# Patient Record
Sex: MALE | Race: BLACK OR AFRICAN AMERICAN | Employment: OTHER | URBAN - METROPOLITAN AREA
[De-identification: names, ages, dates, MRNs, and addresses within clinical notes are randomized per-mention and may not be internally consistent; named-entity substitution may affect disease eponyms.]

---

## 2017-02-03 RX ORDER — METFORMIN HYDROCHLORIDE 500 MG/1
TABLET, EXTENDED RELEASE ORAL
Qty: 60 TAB | Refills: 0 | Status: SHIPPED | OUTPATIENT
Start: 2017-02-03 | End: 2017-03-09 | Stop reason: SDUPTHER

## 2017-02-03 NOTE — TELEPHONE ENCOUNTER
Advised pt MD has sent in a rf but he really needs to have his labs done. Also last seen back 8/4/16 - needs fuv. Lab slip reprinted - at  to .

## 2017-02-06 DIAGNOSIS — E11.9 CONTROLLED TYPE 2 DIABETES MELLITUS WITHOUT COMPLICATION, UNSPECIFIED LONG TERM INSULIN USE STATUS: Primary | ICD-10-CM

## 2017-02-06 DIAGNOSIS — E78.2 MIXED HYPERLIPIDEMIA: ICD-10-CM

## 2017-02-08 ENCOUNTER — OFFICE VISIT (OUTPATIENT)
Dept: INTERNAL MEDICINE CLINIC | Age: 52
End: 2017-02-08

## 2017-02-08 VITALS
HEART RATE: 91 BPM | DIASTOLIC BLOOD PRESSURE: 90 MMHG | RESPIRATION RATE: 16 BRPM | TEMPERATURE: 98.7 F | WEIGHT: 262.4 LBS | OXYGEN SATURATION: 98 % | HEIGHT: 70 IN | BODY MASS INDEX: 37.56 KG/M2 | SYSTOLIC BLOOD PRESSURE: 130 MMHG

## 2017-02-08 DIAGNOSIS — I10 ESSENTIAL HYPERTENSION, BENIGN: ICD-10-CM

## 2017-02-08 DIAGNOSIS — E11.9 DIABETIC EYE EXAM (HCC): ICD-10-CM

## 2017-02-08 DIAGNOSIS — E11.9 ENCOUNTER FOR DIABETIC FOOT EXAM (HCC): ICD-10-CM

## 2017-02-08 DIAGNOSIS — Z11.59 NEED FOR HEPATITIS C SCREENING TEST: ICD-10-CM

## 2017-02-08 DIAGNOSIS — Z23 NEED FOR TDAP VACCINATION: ICD-10-CM

## 2017-02-08 DIAGNOSIS — Z01.00 DIABETIC EYE EXAM (HCC): ICD-10-CM

## 2017-02-08 DIAGNOSIS — E78.2 MIXED HYPERLIPIDEMIA: ICD-10-CM

## 2017-02-08 DIAGNOSIS — B35.1 TOENAIL FUNGUS: ICD-10-CM

## 2017-02-08 DIAGNOSIS — E11.9 CONTROLLED TYPE 2 DIABETES MELLITUS WITHOUT COMPLICATION, UNSPECIFIED LONG TERM INSULIN USE STATUS: ICD-10-CM

## 2017-02-08 LAB — HBA1C MFR BLD HPLC: 10 %

## 2017-02-08 NOTE — MR AVS SNAPSHOT
Visit Information Date & Time Provider Department Dept. Phone Encounter #  
 2/8/2017  4:50 PM Kimberly Parks, [address] [facility] [phone] [id number] Follow-up Instructions Return in about 3 months (around 5/8/2017). Upcoming Health Maintenance Date Due Hepatitis C Screening 1965 COLONOSCOPY 10/24/1981 Pneumococcal 19-64 Medium Risk (1 of 1 - PPSV23) 10/24/1982 DTaP/Tdap/Td series (1 - Tdap) 1/2/2007 EYE EXAM RETINAL OR DILATED Q1 7/15/2015 FOOT EXAM Q1 9/16/2016 MICROALBUMIN Q1 10/12/2016 HEMOGLOBIN A1C Q6M 11/6/2016 LIPID PANEL Q1 5/6/2017 Allergies as of 2/8/2017  Review Complete On: 2/8/2017 By: Kimberly Parks MD  
  
 Severity Noted Reaction Type Reactions Egg High 05/04/2016    Seizures Current Immunizations  Reviewed on 9/1/2016 Name Date  
 TD Vaccine 1/1/2007 Tdap  Incomplete Not reviewed this visit You Were Diagnosed With   
  
 Codes Comments Uncontrolled type 2 diabetes mellitus without complication, without long-term current use of insulin ([place] [address].)    -  Primary ICD-10-CM: E11.65 ICD-9-CM: 250.02 Need for Tdap vaccination     ICD-10-CM: L13 ICD-9-CM: V06.1 Diabetic eye exam ([place] [address].)     ICD-10-CM: E11.9, Z01.00 ICD-9-CM: V72.0, 250.00 Need for hepatitis C screening test     ICD-10-CM: Z11.59 
ICD-9-CM: V73.89 Encounter for diabetic foot exam ([place] [address].)     ICD-10-CM: E11.9 ICD-9-CM: 250.00 Controlled type 2 diabetes mellitus without complication, unspecified long term insulin use status ([facility].)     ICD-10-CM: E11.9 ICD-9-CM: 250.00 Mixed hyperlipidemia     ICD-10-CM: E78.2 ICD-9-CM: 272.2 Toenail fungus     ICD-10-CM: B35.1 ICD-9-CM: 110.1 Essential hypertension, benign     ICD-10-CM: I10 
ICD-9-CM: 401.1 Vitals BP Pulse Temp Resp Height(growth percentile) Weight(growth percentile)  130/90 (BP 1 Location: Right arm, BP Patient Position: Sitting) 91 98.7 °F (37.1 °C) (Oral) 16 5' 10\" (1.778 m) 262 lb 6.4 oz (119 kg) SpO2 BMI Smoking Status 98% 37.65 kg/m2 Never Smoker BMI and BSA Data Body Mass Index Body Surface Area  
 37.65 kg/m 2 2.42 m 2 Preferred Pharmacy Pharmacy Name Phone [id number] [place], [place] - [address] [phone] Your Updated Medication List  
  
   
This list is accurate as of: 2/8/17  6:18 PM.  Always use your most recent med list.  
  
  
  
  
 albuterol 90 mcg/actuation inhaler Commonly known as:  PROVENTIL HFA, VENTOLIN HFA, PROAIR HFA Use albuterol inhaler 2-4 puffs every 4-6 hours for 48 hours then every 4-6 hours as needed. aspirin 81 mg chewable tablet Take 1 Tab by mouth daily. atorvastatin 40 mg tablet Commonly known as:  LIPITOR  
TAKE 1 TABLET BY MOUTH DAILY. cetirizine 10 mg tablet Commonly known as:  ZYRTEC  
TAKE 1 TABLET BY MOUTH DAILY. fluticasone 50 mcg/actuation nasal spray Commonly known as:  Aron Guild 2 Sprays by Both Nostrils route daily. glimepiride 4 mg tablet Commonly known as:  AMARYL  
TAKE 1 TABLET BY MOUTH EVERY DAY IN THE MORNING  
  
 glucose blood VI test strips strip Commonly known as:  ACCU-CHEK BRONSON PLUS TEST STRP Check blood sugars three times daily. Dx E11.65.  
  
 meloxicam 15 mg tablet Commonly known as:  MOBIC Take 1 Tab by mouth daily. metFORMIN  mg tablet Commonly known as:  GLUCOPHAGE XR  
TAKE 2 TABLETS BY MOUTH EVERY DAY WITH DINNER  
  
 triamterene-hydroCHLOROthiazide 37.5-25 mg per capsule Commonly known as:  Darene Ponce De Leon TAKE 1 CAPSULE BY MOUTH DAILY We Performed the Following CBC WITH AUTOMATED DIFF [06619 CPT(R)] HEMOGLOBIN A1C WITH EAG [55027 CPT(R)] HEPATITIS C AB [92909 CPT(R)]  DIABETES FOOT EXAM [7 Custom] LIPID PANEL [95156 CPT(R)] METABOLIC PANEL, COMPREHENSIVE [78469 CPT(R)] MICROALBUMIN, UR, RAND W/ MICROALBUMIN/CREA RATIO [id number] CPT(R)] REFERRAL TO OPHTHALMOLOGY [REF57 Custom] REFERRAL TO PODIATRY [REF90 Custom] TETANUS, DIPHTHERIA TOXOIDS AND ACELLULAR PERTUSSIS VACCINE (TDAP), IN INDIVIDS. >=7, IM [id number] CPT(R)] Follow-up Instructions Return in about 3 months (around 5/8/2017). Referral Information Referral ID Referred By Referred To  
  
 [id number] ESTEFANÍA TRAN Not Available Visits Status Start Date End Date 1 New Request 2/8/17 2/8/18 If your referral has a status of pending review or denied, additional information will be sent to support the outcome of this decision. Referral ID Referred By Referred To  
 [id number] Flavia TARN DPM  
   [address] [facility] [address] [facility], [address] Phone: [phone] Fax: [phone] Visits Status Start Date End Date 1 New Request 2/8/17 2/8/18 If your referral has a status of pending review or denied, additional information will be sent to support the outcome of this decision. Introducing \[facility]\"" & HEALTH SERVICES! John Keita introduces [employer] patient portal. Now you can access parts of your medical record, email your doctor's office, and request medication refills online. 1. In your internet browser, go to [email or web address] 2. Click on the First Time User? Click Here link in the Sign In box. You will see the New Member Sign Up page. 3. Enter your [employer] Access Code exactly as it appears below. You will not need to use this code after youve completed the sign-up process. If you do not sign up before the expiration date, you must request a new code. · [employer] Access Code: [email or web address] Expires: 5/9/2017  6:18 PM 
 
4. Enter the last four digits of your Social Security Number (xxxx) and Date of Birth (mm/dd/yyyy) as indicated and click Submit.  You will be taken to the next sign-up page. 
5. Create a Genoa Color Technologiest ID. This will be your [employer] login ID and cannot be changed, so think of one that is secure and easy to remember. 6. Create a [employer] M[employer] password. You can change your password at any time. 7. Enter your Password Reset Question and Answer. This can be used at a later time if you forget your password. 8. Enter your e-mail address. You will receive e-mail notification when new information is available in [address] 9. Click Sign Up. You can now view and download portions of your medical record. 10. Click the Download Summary menu link to download a portable copy of your medical information. If you have questions, please visit the Frequently Asked Questions section of the [employer] website. Remember, [employer] is NOT to be used for urgent needs. For medical emergencies, dial 911. Now available from your iPhone and Android! Please provide this summary of care documentation to your next provider. Your primary care clinician is listed as ESTEFANÍA TRAN. If you have any questions after today's visit, please call [phone].

## 2017-02-08 NOTE — PROGRESS NOTES
HISTORY OF PRESENT ILLNESS  Huey Owen is a 46 y.o. male. DAVID Abraham is seen today accompanied by wife Madison Look for follow up of diabetes and other problems. He is significantly overdue for follow up. 1. Diabetes type 2 uncontrolled without complication without long term insulin usage. Reviewed labs, done at [facility] and I only have a partial report. His A1c is markedly abnormal.  Reviewed medication adjustments and the need for diet and exercise. Also will have him see Pharm[facility] for diabetic education and counseling. 2. Mixed hyperlipidemia. Stable readings on current regimen. 3. Knee arthritis. He continues with knee pain. He is now more functional.    Social history notable for changing occupations. He is now selling health insurance. MedDATA/gwo     Current Outpatient Prescriptions   Medication Sig    metFORMIN ER (GLUCOPHAGE XR) 500 mg tablet TAKE 2 TABLETS BY MOUTH EVERY DAY WITH DINNER    glimepiride (AMARYL) 4 mg tablet TAKE 1 TABLET BY MOUTH EVERY DAY IN THE MORNING    glucose blood VI test strips (ACCU-CHEK BRONSON PLUS TEST STRP) strip Check blood sugars three times daily. Dx E11.65.    cetirizine (ZYRTEC) 10 mg tablet TAKE 1 TABLET BY MOUTH DAILY.  atorvastatin (LIPITOR) 40 mg tablet TAKE 1 TABLET BY MOUTH DAILY.  triamterene-hydrochlorothiazide (DYAZIDE) 37.5-25 mg per capsule TAKE 1 CAPSULE BY MOUTH DAILY    fluticasone (FLONASE) 50 mcg/actuation nasal spray 2 Sprays by Both Nostrils route daily.  albuterol (PROVENTIL HFA, VENTOLIN HFA, PROAIR HFA) 90 mcg/actuation inhaler Use albuterol inhaler 2-4 puffs every 4-6 hours for 48 hours then every 4-6 hours as needed.  meloxicam (MOBIC) 15 mg tablet Take 1 Tab by mouth daily.  aspirin 81 mg chewable tablet Take 1 Tab by mouth daily. No current facility-administered medications for this visit. Review of Systems   Constitutional: Negative for weight loss. Respiratory: Negative.     Cardiovascular: Negative for chest pain, palpitations, leg swelling and PND. Musculoskeletal: Negative for myalgias. Neurological: Negative for focal weakness. Physical Exam   Constitutional: No distress. Neck: Carotid bruit is not present. Cardiovascular: Normal rate and regular rhythm. Exam reveals no gallop and no friction rub. No murmur heard. Pulmonary/Chest: Effort normal and breath sounds normal. No respiratory distress. Musculoskeletal: He exhibits no edema. Feet without lesion or ulcer    Nursing note and vitals reviewed. ASSESSMENT and PLAN  River Crocker was seen today for medication evaluation and nail problem. Diagnoses and all orders for this visit:    Uncontrolled type 2 diabetes mellitus without complication, without long-term current use of insulin (HCC)  -     MICROALBUMIN, UR, RAND W/ MICROALBUMIN/CREA RATIO  -     HEMOGLOBIN A1C WITH EAG  -     METABOLIC PANEL, COMPREHENSIVE    Need for Tdap vaccination  -     TETANUS, DIPHTHERIA TOXOIDS AND ACELLULAR PERTUSSIS VACCINE (TDAP), IN INDIVIDS. >=7, IM    Diabetic eye exam ([facility]  -     REFERRAL TO OPHTHALMOLOGY    Need for hepatitis C screening test  -     HEPATITIS C AB    Encounter for diabetic foot exam [facility]    Controlled type 2 diabetes mellitus without complication, unspecified long term insulin use status (HCC)  -      DIABETES FOOT EXAM    Mixed hyperlipidemia  -     LIPID PANEL  -     CBC WITH AUTOMATED DIFF    Toenail fungus  -     REFERRAL TO PODIATRY    Essential hypertension, benign- Continue current regimen of prescription and / or OTC medications     This has been fully explained to the patient, who indicates understanding.

## 2017-02-09 LAB — CREATININE, EXTERNAL: 0.94

## 2017-02-13 RX ORDER — GLIMEPIRIDE 4 MG/1
TABLET ORAL
Qty: 30 TAB | Refills: 5 | Status: SHIPPED | OUTPATIENT
Start: 2017-02-13 | End: 2017-08-09 | Stop reason: SDUPTHER

## 2017-03-09 RX ORDER — METFORMIN HYDROCHLORIDE 500 MG/1
TABLET, EXTENDED RELEASE ORAL
Qty: 60 TAB | Refills: 6 | Status: SHIPPED | OUTPATIENT
Start: 2017-03-09 | End: 2017-08-09 | Stop reason: SDUPTHER

## 2017-05-08 ENCOUNTER — OFFICE VISIT (OUTPATIENT)
Dept: INTERNAL MEDICINE CLINIC | Age: 52
End: 2017-05-08

## 2017-05-08 VITALS
OXYGEN SATURATION: 96 % | BODY MASS INDEX: 37.99 KG/M2 | DIASTOLIC BLOOD PRESSURE: 88 MMHG | WEIGHT: 265.4 LBS | HEIGHT: 70 IN | HEART RATE: 95 BPM | RESPIRATION RATE: 20 BRPM | TEMPERATURE: 98.4 F | SYSTOLIC BLOOD PRESSURE: 128 MMHG

## 2017-05-08 DIAGNOSIS — Z11.59 NEED FOR HEPATITIS C SCREENING TEST: ICD-10-CM

## 2017-05-08 DIAGNOSIS — Z01.00 DIABETIC EYE EXAM (HCC): ICD-10-CM

## 2017-05-08 DIAGNOSIS — E11.9 DIABETIC EYE EXAM (HCC): ICD-10-CM

## 2017-05-08 DIAGNOSIS — E78.2 MIXED HYPERLIPIDEMIA: ICD-10-CM

## 2017-05-08 DIAGNOSIS — E11.9 CONTROLLED TYPE 2 DIABETES MELLITUS WITHOUT COMPLICATION, UNSPECIFIED LONG TERM INSULIN USE STATUS: ICD-10-CM

## 2017-05-08 LAB
HBA1C MFR BLD HPLC: 11.2 %
HBA1C MFR BLD HPLC: NORMAL %

## 2017-05-08 RX ORDER — PIOGLITAZONEHYDROCHLORIDE 30 MG/1
30 TABLET ORAL DAILY
Qty: 30 TAB | Refills: 11 | Status: SHIPPED | OUTPATIENT
Start: 2017-05-08 | End: 2018-06-08 | Stop reason: SDUPTHER

## 2017-05-08 NOTE — MR AVS SNAPSHOT
Visit Information Date & Time Provider Department Dept. Phone Encounter #  
 5/8/2017  4:35 PM Jagruti Mcdonough, [facility] [phone] [id number] Follow-up Instructions Return in about 3 months (around 8/8/2017). Upcoming Health Maintenance Date Due Hepatitis C Screening 1965 Pneumococcal 19-64 Medium Risk (1 of 1 - PPSV23) 10/24/1982 EYE EXAM RETINAL OR DILATED Q1 7/15/2015 MICROALBUMIN Q1 10/12/2016 INFLUENZA AGE 9 TO ADULT 8/1/2017 HEMOGLOBIN A1C Q6M 8/8/2017 FOOT EXAM Q1 2/8/2018 LIPID PANEL Q1 2/8/2018 COLONOSCOPY 4/12/2026 DTaP/Tdap/Td series (2 - Td) 2/8/2027 Allergies as of 5/8/2017  Review Complete On: 5/8/2017 By: Jagruti Mcdonough MD  
  
 Severity Noted Reaction Type Reactions Egg High 05/04/2016    Seizures Current Immunizations  Reviewed on 2/8/2017 Name Date  
 TD Vaccine 1/1/2007 Tdap 2/8/2017 Not reviewed this visit You Were Diagnosed With   
  
 Codes Comments Uncontrolled type 2 diabetes mellitus without complication, without long-term current use of insulin ([facility].)    -  Primary ICD-10-CM: E11.65 ICD-9-CM: 250.02 Need for hepatitis C screening test     ICD-10-CM: Z11.59 
ICD-9-CM: V73.89 Diabetic eye exam ([facility].)     ICD-10-CM: E11.9, Z01.00 ICD-9-CM: V72.0, 250.00 Controlled type 2 diabetes mellitus without complication, unspecified long term insulin use status ([facility].)     ICD-10-CM: E11.9 ICD-9-CM: 250.00 Mixed hyperlipidemia     ICD-10-CM: E78.2 ICD-9-CM: 272.2 Vitals BP Pulse Temp Resp Height(growth percentile) Weight(growth percentile) (!) 130/100 (BP 1 Location: Right arm, BP Patient Position: Sitting) 95 98.4 °F (36.9 °C) (Oral) 20 5' 10\" (1.778 m) 265 lb 6.4 oz (120.4 kg) SpO2 BMI Smoking Status 96% 38.08 kg/m2 Never Smoker BMI and BSA Data Body Mass Index Body Surface Area  38.08 kg/m 2 2.44 m 2  
  
 Preferred Pharmacy Pharmacy Name Phone [facility] [address] [facility], [place] [address] [phone] Your Updated Medication List  
  
   
This list is accurate as of: 5/8/17  6:12 PM.  Always use your most recent med list.  
  
  
  
  
 albuterol 90 mcg/actuation inhaler Commonly known as:  PROVENTIL HFA, VENTOLIN HFA, PROAIR HFA Use albuterol inhaler 2-4 puffs every 4-6 hours for 48 hours then every 4-6 hours as needed. aspirin 81 mg chewable tablet Take 1 Tab by mouth daily. atorvastatin 40 mg tablet Commonly known as:  LIPITOR  
TAKE 1 TABLET BY MOUTH DAILY. cetirizine 10 mg tablet Commonly known as:  ZYRTEC  
TAKE 1 TABLET BY MOUTH DAILY. fluticasone 50 mcg/actuation nasal spray Commonly known as:  Neema Bough 2 Sprays by Both Nostrils route daily. glimepiride 4 mg tablet Commonly known as:  AMARYL  
TAKE 1 TABLET BY MOUTH EVERY DAY IN THE MORNING  
  
 glucose blood VI test strips strip Commonly known as:  ACCU-CHEK BRONSON PLUS TEST STRP Check blood sugars three times daily. Dx E11.65.  
  
 meloxicam 15 mg tablet Commonly known as:  MOBIC Take 1 Tab by mouth daily. metFORMIN  mg tablet Commonly known as:  GLUCOPHAGE XR  
TAKE 2 TABLETS BY MOUTH EVERY DAY WITH DINNER  
  
 pioglitazone 30 mg tablet Commonly known as:  ACTOS Take 1 Tab by mouth daily. triamterene-hydroCHLOROthiazide 37.5-25 mg per capsule Commonly known as:  Marita Felicitas TAKE 1 CAPSULE BY MOUTH DAILY Prescriptions Sent to Pharmacy Refills  
 pioglitazone (ACTOS) 30 mg tablet 11 Sig: Take 1 Tab by mouth daily. Class: Normal  
 Pharmacy: Lan Alvarez [address] [place] [address], [facility] Ph #: [phone] Route: Oral  
  
We Performed the Following AMB POC HEMOGLOBIN A1C [14294 CPT(R)] CBC WITH AUTOMATED DIFF [95751 CPT(R)] HEPATITIS C AB [98971 CPT(R)] LIPID PANEL [08677 CPT(R)] METABOLIC PANEL, COMPREHENSIVE [48620 CPT(R)] MICROALBUMIN, UR, RAND W/ MICROALBUMIN/CREA RATIO [id number] CPT(R)] REFERRAL TO OPHTHALMOLOGY [REF57 Custom] Comments:  
 Please evaluate patient for diabetic eye exam.  
  
Follow-up Instructions Return in about 3 months (around 8/8/2017). Referral Information Referral ID Referred By Referred To  
  
 [id number] ESTEFANÍA TRAN Not Available Visits Status Start Date End Date 1 New Request 5/8/17 5/8/18 If your referral has a status of pending review or denied, additional information will be sent to support the outcome of this decision. Introducing [facility] & HEALTH SERVICES! Lidya Noyola introduces [employer] patient portal. Now you can access parts of your medical record, email your doctor's office, and request medication refills online. 1. In your internet browser, go to [email or web address] 2. Click on the First Time User? Click Here link in the Sign In box. You will see the New Member Sign Up page. 3. Enter your [employer] Access Code exactly as it appears below. You will not need to use this code after youve completed the sign-up process. If you do not sign up before the expiration date, you must request a new code. · [employer] Access Code: [email or web address] Expires: 5/9/2017  7:18 PM 
 
4. Enter the last four digits of your Social Security Number (xxxx) and Date of Birth (mm/dd/yyyy) as indicated and click Submit. You will be taken to the next sign-up page. 5. Create a Aigout ID. This will be your [employer] login ID and cannot be changed, so think of one that is secure and easy to remember. 6. Create a [employer] password. You can change your password at any time. 7. Enter your Password Reset Question and Answer. This can be used at a later time if you forget your password. 8. Enter your e-mail address. You will receive e-mail notification when new information is available in [address] 9. Click Sign Up.  You can now view and download portions of your medical record. 10. Click the Download Summary menu link to download a portable copy of your medical information. If you have questions, please visit the Frequently Asked Questions section of the [employer] website. Remember, [employer] is NOT to be used for urgent needs. For medical emergencies, dial 911. Now available from your iPhone and Android! Please provide this summary of care documentation to your next provider. Your primary care clinician is listed as ESTEFANÍA TRAN. If you have any questions after today's visit, please call [phone].

## 2017-05-09 RX ORDER — ALBUTEROL SULFATE 90 UG/1
AEROSOL, METERED RESPIRATORY (INHALATION)
Qty: 1 INHALER | Refills: 3 | Status: SHIPPED | OUTPATIENT
Start: 2017-05-09 | End: 2019-05-29 | Stop reason: ALTCHOICE

## 2017-05-15 RX ORDER — ATORVASTATIN CALCIUM 40 MG/1
TABLET, FILM COATED ORAL
Qty: 30 TAB | Refills: 6 | Status: SHIPPED | OUTPATIENT
Start: 2017-05-15 | End: 2017-08-09 | Stop reason: SDUPTHER

## 2017-05-15 RX ORDER — TRIAMTERENE/HYDROCHLOROTHIAZID 37.5-25 MG
TABLET ORAL
Qty: 30 TAB | Refills: 6 | Status: SHIPPED | OUTPATIENT
Start: 2017-05-15 | End: 2017-08-09 | Stop reason: SDUPTHER

## 2017-08-09 ENCOUNTER — OFFICE VISIT (OUTPATIENT)
Dept: INTERNAL MEDICINE CLINIC | Age: 52
End: 2017-08-09

## 2017-08-09 VITALS
SYSTOLIC BLOOD PRESSURE: 120 MMHG | DIASTOLIC BLOOD PRESSURE: 96 MMHG | RESPIRATION RATE: 18 BRPM | TEMPERATURE: 98.2 F | BODY MASS INDEX: 37.77 KG/M2 | WEIGHT: 263.8 LBS | HEIGHT: 70 IN | OXYGEN SATURATION: 94 % | HEART RATE: 91 BPM

## 2017-08-09 DIAGNOSIS — E78.2 MIXED HYPERLIPIDEMIA: Primary | ICD-10-CM

## 2017-08-09 DIAGNOSIS — J01.01 ACUTE RECURRENT MAXILLARY SINUSITIS: ICD-10-CM

## 2017-08-09 DIAGNOSIS — E11.9 DIABETIC EYE EXAM (HCC): ICD-10-CM

## 2017-08-09 DIAGNOSIS — I10 ESSENTIAL HYPERTENSION, BENIGN: ICD-10-CM

## 2017-08-09 DIAGNOSIS — Z01.00 DIABETIC EYE EXAM (HCC): ICD-10-CM

## 2017-08-09 DIAGNOSIS — Z11.59 NEED FOR HEPATITIS C SCREENING TEST: ICD-10-CM

## 2017-08-09 RX ORDER — GLIMEPIRIDE 4 MG/1
TABLET ORAL
Qty: 30 TAB | Refills: 11 | Status: SHIPPED | OUTPATIENT
Start: 2017-08-09 | End: 2017-12-12 | Stop reason: SDUPTHER

## 2017-08-09 RX ORDER — FLUTICASONE PROPIONATE 50 MCG
2 SPRAY, SUSPENSION (ML) NASAL DAILY
Qty: 1 BOTTLE | Refills: 12 | Status: SHIPPED | OUTPATIENT
Start: 2017-08-09 | End: 2018-08-27 | Stop reason: SDUPTHER

## 2017-08-09 RX ORDER — METFORMIN HYDROCHLORIDE 500 MG/1
TABLET, EXTENDED RELEASE ORAL
Qty: 60 TAB | Refills: 11 | Status: SHIPPED | OUTPATIENT
Start: 2017-08-09 | End: 2017-12-12 | Stop reason: SDUPTHER

## 2017-08-09 RX ORDER — TRIAMTERENE AND HYDROCHLOROTHIAZIDE 37.5; 25 MG/1; MG/1
CAPSULE ORAL
Qty: 30 CAP | Refills: 11 | Status: SHIPPED | OUTPATIENT
Start: 2017-08-09 | End: 2018-06-08 | Stop reason: SDUPTHER

## 2017-08-09 RX ORDER — ATORVASTATIN CALCIUM 40 MG/1
TABLET, FILM COATED ORAL
Qty: 30 TAB | Refills: 11 | Status: SHIPPED | OUTPATIENT
Start: 2017-08-09 | End: 2018-06-08 | Stop reason: SDUPTHER

## 2017-08-09 NOTE — MR AVS SNAPSHOT
Visit Information Date & Time Provider Department Dept. Phone Encounter #  
 8/9/2017  4:50 PM Ashwin Boyd, [address] [facility] Internal Medicine [phone] [id number] Follow-up Instructions Return in about 4 months (around 12/9/2017). Upcoming Health Maintenance Date Due Hepatitis C Screening 1965 Pneumococcal 19-64 Medium Risk (1 of 1 - PPSV23) 10/24/1982 EYE EXAM RETINAL OR DILATED Q1 7/15/2015 MICROALBUMIN Q1 10/12/2016 INFLUENZA AGE 9 TO ADULT 8/1/2017 HEMOGLOBIN A1C Q6M 11/8/2017 FOOT EXAM Q1 2/8/2018 LIPID PANEL Q1 2/8/2018 COLONOSCOPY 4/12/2026 DTaP/Tdap/Td series (2 - Td) 2/8/2027 Allergies as of 8/9/2017  Review Complete On: 8/9/2017 By: Ashwin Boyd MD  
  
 Severity Noted Reaction Type Reactions Egg High 05/04/2016    Seizures Current Immunizations  Reviewed on 2/8/2017 Name Date  
 TD Vaccine 1/1/2007 Tdap 2/8/2017 Not reviewed this visit You Were Diagnosed With   
  
 Codes Comments Mixed hyperlipidemia    -  Primary ICD-10-CM: V72.5 ICD-9-CM: 272.2 Uncontrolled diabetes mellitus type 2 without complications, unspecified long term insulin use status ([facility]     ICD-10-CM: E11.65 ICD-9-CM: 250.02 Acute recurrent maxillary sinusitis     ICD-10-CM: J01.01 
ICD-9-CM: 461.0 Need for hepatitis C screening test     ICD-10-CM: Z11.59 
ICD-9-CM: V73.89 Diabetic eye exam ([facility].)     ICD-10-CM: E11.9, Z01.00 ICD-9-CM: V72.0, 250.00 Essential hypertension, benign     ICD-10-CM: I10 
ICD-9-CM: 401.1 Vitals BP Pulse Temp Resp Height(growth percentile) Weight(growth percentile) (!) 120/96 91 98.2 °F (36.8 °C) (Oral) 18 5' 10\" (1.778 m) 263 lb 12.8 oz (119.7 kg) SpO2 BMI Smoking Status 94% 37.85 kg/m2 Never Smoker Vitals History BMI and BSA Data Body Mass Index Body Surface Area  
 37.85 kg/m 2 2.43 m 2 Preferred Pharmacy  Pharmacy Name Phone [facility] [address] [phone] Your Updated Medication List  
  
   
This list is accurate as of: 8/9/17  5:56 PM.  Always use your most recent med list.  
  
  
  
  
 aspirin 81 mg chewable tablet Take 1 Tab by mouth daily. atorvastatin 40 mg tablet Commonly known as:  LIPITOR  
TAKE 1 TABLET BY MOUTH DAILY. cetirizine 10 mg tablet Commonly known as:  ZYRTEC  
TAKE 1 TABLET BY MOUTH DAILY. fluticasone 50 mcg/actuation nasal spray Commonly known as:  Palencia Newcomer 2 Sprays by Both Nostrils route daily. glimepiride 4 mg tablet Commonly known as:  AMARYL  
TAKE 1 TABLET BY MOUTH EVERY DAY IN THE MORNING  
  
 glucose blood VI test strips strip Commonly known as:  ACCU-CHEK BRONSON PLUS TEST STRP Check blood sugars three times daily. Dx E11.65.  
  
 meloxicam 15 mg tablet Commonly known as:  MOBIC Take 1 Tab by mouth daily. metFORMIN  mg tablet Commonly known as:  GLUCOPHAGE XR  
TAKE 2 TABLETS BY MOUTH EVERY DAY WITH DINNER  
  
 pioglitazone 30 mg tablet Commonly known as:  ACTOS Take 1 Tab by mouth daily. PROAIR HFA 90 mcg/actuation inhaler Generic drug:  albuterol USE 2-4 PUFFS BY MOUTH EVERY 4-6 HOURS FOR 48 HOURS THEN AS NEEDED  
  
 triamterene-hydroCHLOROthiazide 37.5-25 mg per capsule Commonly known as:  Les Wang TAKE 1 CAPSULE BY MOUTH DAILY Prescriptions Sent to Pharmacy Refills  
 metFORMIN ER (GLUCOPHAGE XR) 500 mg tablet 11 Sig: TAKE 2 TABLETS BY MOUTH EVERY DAY WITH DINNER Class: Normal  
 Pharmacy: [place] [id number], [employer] [address] Ph #: [phone]  
 glimepiride (AMARYL) 4 mg tablet 11 Sig: TAKE 1 TABLET BY MOUTH EVERY DAY IN THE MORNING Class: Normal  
 Pharmacy: [place] [address] [address] Ph #: [phone]  
 atorvastatin (LIPITOR) 40 mg tablet 11  Sig: TAKE 1 TABLET BY MOUTH DAILY. Class: Normal  
 Pharmacy: [place] [address] Ph #: [phone]  
 triamterene-hydroCHLOROthiazide (DYAZIDE) 37.5-25 mg per capsule 11 Sig: TAKE 1 CAPSULE BY MOUTH DAILY Class: Normal  
 Pharmacy: [place] [address] Ph #: [phone]  
 fluticasone (FLONASE) 50 mcg/actuation nasal spray 12 Si Sprays by Both Nostrils route daily. Class: Normal  
 Pharmacy: [place] [address] Ph #: [phone] Route: Both Nostrils We Performed the Following CBC WITH AUTOMATED DIFF [84493 CPT(R)] HEMOGLOBIN A1C WITH EAG [26568 CPT(R)] HEPATITIS C AB [88647 CPT(R)] LIPID PANEL [61889 CPT(R)] METABOLIC PANEL, COMPREHENSIVE [37609 CPT(R)] MICROALBUMIN, UR, RAND W/ MICROALBUMIN/CREA RATIO [id number] CPT(R)] REFERRAL TO OPHTHALMOLOGY [REF57 Custom] Comments:  
 Please evaluate patient for diabetic eye exam.  
  
Follow-up Instructions Return in about 4 months (around 2017). Referral Information Referral ID Referred By Referred To  
  
 [id number] ESTEFANÍA TRAN Not Available Visits Status Start Date End Date 1 New Request 17 If your referral has a status of pending review or denied, additional information will be sent to support the outcome of this decision. Introducing [facility] & HEALTH SERVICES! Polo Correa introduces [employer] patient portal. Now you can access parts of your medical record, email your doctor's office, and request medication refills online. 1. In your internet browser, go to [email or web address] 2. Click on the First Time User? Click Here link in the Sign In box. You will see the New Member Sign Up page. 3. Enter your ShotSpotter Access Code exactly as it appears below. You will not need to use this code after youve completed the sign-up process.  If you do not sign up before the expiration date, you must request a new code. · [employer] Access Code: [email or web address] Expires: 11/7/2017  5:56 PM 
 
4. Enter the last four digits of your Social Security Number (xxxx) and Date of Birth (mm/dd/yyyy) as indicated and click Submit. You will be taken to the next sign-up page. 5. Create a [employer] ID. This will be your [employer] login ID and cannot be changed, so think of one that is secure and easy to remember. 6. Create a [employer] password. You can change your password at any time. 7. Enter your Password Reset Question and Answer. This can be used at a later time if you forget your password. 8. Enter your e-mail address. You will receive e-mail notification when new information is available in [address] 9. Click Sign Up. You can now view and download portions of your medical record. 10. Click the Download Summary menu link to download a portable copy of your medical information. If you have questions, please visit the Frequently Asked Questions section of the [employer] website. Remember, [employer] is NOT to be used for urgent needs. For medical emergencies, dial 911. Now available from your iPhone and Android! Please provide this summary of care documentation to your next provider. Your primary care clinician is listed as ESTEFANÍA TRAN. If you have any questions after today's visit, please call [phone].

## 2017-08-09 NOTE — PROGRESS NOTES
HISTORY OF PRESENT ILLNESS  Claudean Bower is a 46 y.o. male. HPI Erin Barboza is seen today for follow up of diabetes and other problems. 1. Hypertension. Fair readings today. Will follow for now. Strongly encouraged work on diet and exercise. 2. Diabetes, hyperlipidemia. Due for routine labs. He was supposed to get these done prior to office visit and they failed to do this. 3. DJD. Symptoms are about the same, stable overall. Review of systems notable for poor sleep pattern which is chronic. He dates this back to his  days. MedDATA/gwo     Current Outpatient Prescriptions   Medication Sig    metFORMIN ER (GLUCOPHAGE XR) 500 mg tablet TAKE 2 TABLETS BY MOUTH EVERY DAY WITH DINNER    glimepiride (AMARYL) 4 mg tablet TAKE 1 TABLET BY MOUTH EVERY DAY IN THE MORNING    atorvastatin (LIPITOR) 40 mg tablet TAKE 1 TABLET BY MOUTH DAILY.  triamterene-hydroCHLOROthiazide (DYAZIDE) 37.5-25 mg per capsule TAKE 1 CAPSULE BY MOUTH DAILY    fluticasone (FLONASE) 50 mcg/actuation nasal spray 2 Sprays by Both Nostrils route daily.  PROAIR HFA 90 mcg/actuation inhaler USE 2-4 PUFFS BY MOUTH EVERY 4-6 HOURS FOR 48 HOURS THEN AS NEEDED    pioglitazone (ACTOS) 30 mg tablet Take 1 Tab by mouth daily.  glucose blood VI test strips (ACCU-CHEK BRONSON PLUS TEST STRP) strip Check blood sugars three times daily. Dx E11.65.    cetirizine (ZYRTEC) 10 mg tablet TAKE 1 TABLET BY MOUTH DAILY.  meloxicam (MOBIC) 15 mg tablet Take 1 Tab by mouth daily.  aspirin 81 mg chewable tablet Take 1 Tab by mouth daily. No current facility-administered medications for this visit. I have reviewed/discussed the above normal BMI with the patient and spouse. I have recommended the following interventions: dietary management education, guidance, and counseling and encourage exercise . Mala Daily Review of Systems   Constitutional: Negative for weight loss. Respiratory: Negative.     Cardiovascular: Negative for chest pain, palpitations, leg swelling and PND. Musculoskeletal: Negative for myalgias. Neurological: Negative for focal weakness. Physical Exam   Constitutional: No distress. Neck: Carotid bruit is not present. Cardiovascular: Normal rate and regular rhythm. Exam reveals no gallop and no friction rub. No murmur heard. Pulmonary/Chest: Effort normal and breath sounds normal. No respiratory distress. Musculoskeletal: He exhibits no edema. Nursing note and vitals reviewed. ASSESSMENT and PLAN  Diagnoses and all orders for this visit:    1. Mixed hyperlipidemia  -     atorvastatin (LIPITOR) 40 mg tablet; TAKE 1 TABLET BY MOUTH DAILY. -     CBC WITH AUTOMATED DIFF  -     LIPID PANEL    2. Uncontrolled diabetes mellitus type 2 without complications, unspecified long term insulin use status (HCC)  -     metFORMIN ER (GLUCOPHAGE XR) 500 mg tablet; TAKE 2 TABLETS BY MOUTH EVERY DAY WITH DINNER  -     glimepiride (AMARYL) 4 mg tablet; TAKE 1 TABLET BY MOUTH EVERY DAY IN THE MORNING  -     METABOLIC PANEL, COMPREHENSIVE  -     HEMOGLOBIN A1C WITH EAG  -     MICROALBUMIN, UR, RAND W/ MICROALBUMIN/CREA RATIO    3. Acute recurrent maxillary sinusitis  -     fluticasone (FLONASE) 50 mcg/actuation nasal spray; 2 Sprays by Both Nostrils route daily. 4. Need for hepatitis C screening test  -     HEPATITIS C AB    5.  Diabetic eye exam ([facility] [address]  -     REFERRAL TO OPHTHALMOLOGY    6. Essential hypertension, benign  -     triamterene-hydroCHLOROthiazide (DYAZIDE) 37.5-25 mg per capsule; TAKE 1 CAPSULE BY MOUTH DAILY

## 2017-12-07 ENCOUNTER — OFFICE VISIT (OUTPATIENT)
Dept: INTERNAL MEDICINE CLINIC | Age: 52
End: 2017-12-07

## 2017-12-07 VITALS
OXYGEN SATURATION: 95 % | BODY MASS INDEX: 38.77 KG/M2 | HEIGHT: 70 IN | WEIGHT: 270.8 LBS | TEMPERATURE: 98.6 F | RESPIRATION RATE: 16 BRPM | HEART RATE: 100 BPM | SYSTOLIC BLOOD PRESSURE: 126 MMHG | DIASTOLIC BLOOD PRESSURE: 84 MMHG

## 2017-12-07 DIAGNOSIS — E78.2 MIXED HYPERLIPIDEMIA: ICD-10-CM

## 2017-12-07 DIAGNOSIS — I10 ESSENTIAL HYPERTENSION, BENIGN: ICD-10-CM

## 2017-12-07 DIAGNOSIS — E11.9 CONTROLLED TYPE 2 DIABETES MELLITUS WITHOUT COMPLICATION, UNSPECIFIED LONG TERM INSULIN USE STATUS: Primary | ICD-10-CM

## 2017-12-07 DIAGNOSIS — M17.2 POST-TRAUMATIC OSTEOARTHRITIS OF BOTH KNEES: ICD-10-CM

## 2017-12-07 NOTE — MR AVS SNAPSHOT
Visit Information Date & Time Provider Department Dept. Phone Encounter #  
 12/7/2017  4:35 PM Cj Gutierrez, [facility] [phone] [id number] Upcoming Health Maintenance Date Due Hepatitis C Screening 1965 Pneumococcal 19-64 Medium Risk (1 of 1 - PPSV23) 10/24/1982 EYE EXAM RETINAL OR DILATED Q1 7/15/2015 MICROALBUMIN Q1 10/12/2016 HEMOGLOBIN A1C Q6M 11/8/2017 FOOT EXAM Q1 2/8/2018 LIPID PANEL Q1 2/8/2018 COLONOSCOPY 4/12/2026 DTaP/Tdap/Td series (2 - Td) 2/8/2027 Allergies as of 12/7/2017  Review Complete On: 12/7/2017 By: Cj Gutierrez MD  
  
 Severity Noted Reaction Type Reactions Egg High 05/04/2016    Seizures Current Immunizations  Reviewed on 2/8/2017 Name Date  
 TD Vaccine 1/1/2007 Tdap 2/8/2017 Not reviewed this visit You Were Diagnosed With   
  
 Codes Comments Controlled type 2 diabetes mellitus without complication, unspecified long term insulin use status [facility]    -  Primary ICD-10-CM: E11.9 ICD-9-CM: 250.00 Post-traumatic osteoarthritis of both knees     ICD-10-CM: M17.2 ICD-9-CM: 715.26 Mixed hyperlipidemia     ICD-10-CM: E78.2 ICD-9-CM: 272.2 Essential hypertension, benign     ICD-10-CM: I10 
ICD-9-CM: 401.1 Vitals BP Pulse Temp Resp Height(growth percentile) Weight(growth percentile) 126/84 (BP 1 Location: Right arm, BP Patient Position: Sitting) 100 98.6 °F (37 °C) (Oral) 16 5' 10\" (1.778 m) 270 lb 12.8 oz (122.8 kg) SpO2 BMI Smoking Status 95% 38.86 kg/m2 Never Smoker BMI and BSA Data Body Mass Index Body Surface Area  
 38.86 kg/m 2 2.46 m 2 Preferred Pharmacy Pharmacy Name Phone JANNY ESCOBAR [facility] [address] [facility] [phone] Your Updated Medication List  
  
   
This list is accurate as of: 12/7/17  5:50 PM.  Always use your most recent med list.  
  
  
  
  
 aspirin 81 mg chewable tablet Take 1 Tab by mouth daily. atorvastatin 40 mg tablet Commonly known as:  LIPITOR  
TAKE 1 TABLET BY MOUTH DAILY. cetirizine 10 mg tablet Commonly known as:  ZYRTEC  
TAKE 1 TABLET BY MOUTH DAILY. fluticasone 50 mcg/actuation nasal spray Commonly known as:  Shahana Fryer 2 Sprays by Both Nostrils route daily. glimepiride 4 mg tablet Commonly known as:  AMARYL  
TAKE 1 TABLET BY MOUTH EVERY DAY IN THE MORNING  
  
 glucose blood VI test strips strip Commonly known as:  ACCU-CHEK BRONSON PLUS TEST STRP Check blood sugars three times daily. Dx E11.65.  
  
 meloxicam 15 mg tablet Commonly known as:  MOBIC Take 1 Tab by mouth daily. metFORMIN  mg tablet Commonly known as:  GLUCOPHAGE XR  
TAKE 2 TABLETS BY MOUTH EVERY DAY WITH DINNER  
  
 pioglitazone 30 mg tablet Commonly known as:  ACTOS Take 1 Tab by mouth daily. PROAIR HFA 90 mcg/actuation inhaler Generic drug:  albuterol USE 2-4 PUFFS BY MOUTH EVERY 4-6 HOURS FOR 48 HOURS THEN AS NEEDED  
  
 triamterene-hydroCHLOROthiazide 37.5-25 mg per capsule Commonly known as:  Randalyn Nearing TAKE 1 CAPSULE BY MOUTH DAILY We Performed the Following REFERRAL TO OPHTHALMOLOGY [REF57 Custom] Comments:  
 Please evaluate patient for diabetic eye exam.  
 REFERRAL TO ORTHOPEDICS [MQP308 Custom] Referral Information Referral ID Referred By Referred To  
  
 [id number] Yogesh TRAN [facility], [address] [place], [address] Visits Status Start Date End Date 1 New Request 12/7/17 12/7/18 If your referral has a status of pending review or denied, additional information will be sent to support the outcome of this decision. Referral ID Referred By Referred To  
 [id number] Mary TRAN MD  
   [address] [facility] Phone: [phone]    Fax: [phone] Visits Status Start Date End Date 1 New Request 12/7/17 12/7/18 If your referral has a status of pending review or denied, additional information will be sent to support the outcome of this decision. Introducing [facility] & HEALTH SERVICES! Chika Rosario introduces [employer] patient portal. Now you can access parts of your medical record, email your doctor's office, and request medication refills online. 1. In your internet browser, go to [email or web address] 2. Click on the First Time User? Click Here link in the Sign In box. You will see the New Member Sign Up page. 3. Enter your App[employer] Access Code exactly as it appears below. You will not need to use this code after youve completed the sign-up process. If you do not sign up before the expiration date, you must request a new code. · [employer] Access Code: [email or web address] Expires: 3/7/2018  4:30 PM 
 
4. Enter the last four digits of your Social Security Number (xxxx) and Date of Birth (mm/dd/yyyy) as indicated and click Submit. You will be taken to the next sign-up page. 5. Create a Appington ID. This will be your Appington login ID and cannot be changed, so think of one that is secure and easy to remember. 6. Create a Appington password. You can change your password at any time. 7. Enter your Password Reset Question and Answer. This can be used at a later time if you forget your password. 8. Enter your e-mail address. You will receive e-mail notification when new information is available in [address] 9. Click Sign Up. You can now view and download portions of your medical record. 10. Click the Download Summary menu link to download a portable copy of your medical information. If you have questions, please visit the Frequently Asked Questions section of the Appington website. Remember, Appington is NOT to be used for urgent needs. For medical emergencies, dial 911. Now available from your iPhone and Android! Please provide this summary of care documentation to your next provider. Your primary care clinician is listed as ESTEFANÍA TRAN. If you have any questions after today's visit, please call [phone].

## 2017-12-07 NOTE — PROGRESS NOTES
HISTORY OF PRESENT ILLNESS  Lovely Mistry is a 45 y.o. male. DAVID Crocker is seen today for follow up of hypertension and other problems. 1. Bilateral leg edema with pain. He rates knee pain at 7/10, bilateral. He has had some knee problems and past surgeries. He has not been to ortho recently. Reviewed with him leg swelling is likely due to the knee arthritis, but there is probably a stasis element as well. I encouraged compression hose. 2. Hypertension. Stable on current regimen. 3. Diabetes, hyperlipidemia. Markedly overdue. I sent him to the lab for testing even though he is not specifically fasting. Social history notable for him being on SSI disability from his job as an . He is now attending [employer] for vocational retraining. I congratulated him on his efforts. MedDATA/gwo     Current Outpatient Prescriptions   Medication Sig    metFORMIN ER (GLUCOPHAGE XR) 500 mg tablet TAKE 2 TABLETS BY MOUTH EVERY DAY WITH DINNER    glimepiride (AMARYL) 4 mg tablet TAKE 1 TABLET BY MOUTH EVERY DAY IN THE MORNING    atorvastatin (LIPITOR) 40 mg tablet TAKE 1 TABLET BY MOUTH DAILY.  triamterene-hydroCHLOROthiazide (DYAZIDE) 37.5-25 mg per capsule TAKE 1 CAPSULE BY MOUTH DAILY    fluticasone (FLONASE) 50 mcg/actuation nasal spray 2 Sprays by Both Nostrils route daily.  PROAIR HFA 90 mcg/actuation inhaler USE 2-4 PUFFS BY MOUTH EVERY 4-6 HOURS FOR 48 HOURS THEN AS NEEDED    pioglitazone (ACTOS) 30 mg tablet Take 1 Tab by mouth daily.  glucose blood VI test strips (ACCU-CHEK BRONSON PLUS TEST STRP) strip Check blood sugars three times daily. Dx E11.65.    cetirizine (ZYRTEC) 10 mg tablet TAKE 1 TABLET BY MOUTH DAILY.  meloxicam (MOBIC) 15 mg tablet Take 1 Tab by mouth daily.  aspirin 81 mg chewable tablet Take 1 Tab by mouth daily. No current facility-administered medications for this visit. Review of Systems   Constitutional: Negative for weight loss. Respiratory: Negative. Cardiovascular: Negative for chest pain, palpitations, leg swelling and PND. Musculoskeletal: Positive for joint pain. Negative for myalgias. Neurological: Negative for focal weakness. Physical Exam   Constitutional: No distress. Neck: Carotid bruit is not present. Cardiovascular: Normal rate and regular rhythm. Exam reveals no gallop and no friction rub. No murmur heard. Pulmonary/Chest: Effort normal and breath sounds normal. No respiratory distress. Musculoskeletal: He exhibits no edema. Nursing note and vitals reviewed. ASSESSMENT and PLAN  Diagnoses and all orders for this visit:    1. Controlled type 2 diabetes mellitus without complication, unspecified long term insulin use status (HCC)  -     REFERRAL TO OPHTHALMOLOGY, see hpi , check labs    2. Post-traumatic osteoarthritis of both knees  -     REFERRAL TO ORTHOPEDICS    3. Mixed hyperlipidemia- Check lipid panel and appropriate studies to rule out med side effects now and in 3 months ( high risk med management).      4. Essential hypertension, benign- Continue current regimen of prescription and / or OTC medications     Plan was reviewed with patient and family, understanding expressed

## 2017-12-08 LAB
ALBUMIN SERPL-MCNC: 4.4 G/DL (ref 3.5–5.5)
ALBUMIN/CREAT UR: 7.7 MG/G CREAT (ref 0–30)
ALBUMIN/GLOB SERPL: 1.7 {RATIO} (ref 1.2–2.2)
ALP SERPL-CCNC: 80 IU/L (ref 39–117)
ALT SERPL-CCNC: 27 IU/L (ref 0–44)
AST SERPL-CCNC: 12 IU/L (ref 0–40)
BASOPHILS # BLD AUTO: 0.1 X10E3/UL (ref 0–0.2)
BASOPHILS NFR BLD AUTO: 1 %
BILIRUB SERPL-MCNC: <0.2 MG/DL (ref 0–1.2)
BUN SERPL-MCNC: 11 MG/DL (ref 6–24)
BUN/CREAT SERPL: 12 (ref 9–20)
CALCIUM SERPL-MCNC: 9.4 MG/DL (ref 8.7–10.2)
CHLORIDE SERPL-SCNC: 99 MMOL/L (ref 96–106)
CHOLEST SERPL-MCNC: 177 MG/DL (ref 100–199)
CO2 SERPL-SCNC: 27 MMOL/L (ref 18–29)
CREAT SERPL-MCNC: 0.9 MG/DL (ref 0.76–1.27)
CREAT UR-MCNC: 96.5 MG/DL
EOSINOPHIL # BLD AUTO: 0.2 X10E3/UL (ref 0–0.4)
EOSINOPHIL NFR BLD AUTO: 2 %
ERYTHROCYTE [DISTWIDTH] IN BLOOD BY AUTOMATED COUNT: 13.8 % (ref 12.3–15.4)
EST. AVERAGE GLUCOSE BLD GHB EST-MCNC: 272 MG/DL
GFR SERPLBLD CREATININE-BSD FMLA CKD-EPI: 112 ML/MIN/1.73
GFR SERPLBLD CREATININE-BSD FMLA CKD-EPI: 96 ML/MIN/1.73
GLOBULIN SER CALC-MCNC: 2.6 G/DL (ref 1.5–4.5)
GLUCOSE SERPL-MCNC: 251 MG/DL (ref 65–99)
HBA1C MFR BLD: 11.1 % (ref 4.8–5.6)
HCT VFR BLD AUTO: 40.3 % (ref 37.5–51)
HCV AB S/CO SERPL IA: <0.1 S/CO RATIO (ref 0–0.9)
HDLC SERPL-MCNC: 33 MG/DL
HGB BLD-MCNC: 13.2 G/DL (ref 13–17.7)
IMM GRANULOCYTES # BLD: 0 X10E3/UL (ref 0–0.1)
IMM GRANULOCYTES NFR BLD: 0 %
LDLC SERPL CALC-MCNC: 99 MG/DL (ref 0–99)
LYMPHOCYTES # BLD AUTO: 2.8 X10E3/UL (ref 0.7–3.1)
LYMPHOCYTES NFR BLD AUTO: 36 %
MCH RBC QN AUTO: 29.4 PG (ref 26.6–33)
MCHC RBC AUTO-ENTMCNC: 32.8 G/DL (ref 31.5–35.7)
MCV RBC AUTO: 90 FL (ref 79–97)
MICROALBUMIN UR-MCNC: 7.4 UG/ML
MONOCYTES # BLD AUTO: 0.3 X10E3/UL (ref 0.1–0.9)
MONOCYTES NFR BLD AUTO: 4 %
NEUTROPHILS # BLD AUTO: 4.4 X10E3/UL (ref 1.4–7)
NEUTROPHILS NFR BLD AUTO: 57 %
PLATELET # BLD AUTO: 343 X10E3/UL (ref 150–379)
POTASSIUM SERPL-SCNC: 4.2 MMOL/L (ref 3.5–5.2)
PROT SERPL-MCNC: 7 G/DL (ref 6–8.5)
RBC # BLD AUTO: 4.49 X10E6/UL (ref 4.14–5.8)
SODIUM SERPL-SCNC: 141 MMOL/L (ref 134–144)
TRIGL SERPL-MCNC: 225 MG/DL (ref 0–149)
VLDLC SERPL CALC-MCNC: 45 MG/DL (ref 5–40)
WBC # BLD AUTO: 7.7 X10E3/UL (ref 3.4–10.8)

## 2017-12-12 ENCOUNTER — TELEPHONE (OUTPATIENT)
Dept: INTERNAL MEDICINE CLINIC | Age: 52
End: 2017-12-12

## 2017-12-12 RX ORDER — METFORMIN HYDROCHLORIDE 500 MG/1
TABLET, EXTENDED RELEASE ORAL
Qty: 120 TAB | Refills: 11 | Status: SHIPPED | OUTPATIENT
Start: 2017-12-12 | End: 2018-03-26 | Stop reason: SDUPTHER

## 2017-12-12 RX ORDER — GLIMEPIRIDE 4 MG/1
TABLET ORAL
Qty: 60 TAB | Refills: 11 | Status: SHIPPED | OUTPATIENT
Start: 2017-12-12 | End: 2018-06-08 | Stop reason: SDUPTHER

## 2017-12-12 NOTE — TELEPHONE ENCOUNTER
Reviewed lab - diabetes poorly controlled.  Strongly advised Diet and exercise, reviewed med adjustments

## 2018-03-07 ENCOUNTER — TELEPHONE (OUTPATIENT)
Dept: INTERNAL MEDICINE CLINIC | Age: 53
End: 2018-03-07

## 2018-03-07 RX ORDER — METRONIDAZOLE 500 MG/1
500 TABLET ORAL 3 TIMES DAILY
Qty: 21 TAB | Refills: 0 | Status: SHIPPED | OUTPATIENT
Start: 2018-03-07 | End: 2018-06-08 | Stop reason: SDUPTHER

## 2018-03-07 NOTE — TELEPHONE ENCOUNTER
Advised pt MD has sent in flagyl 500 mg tid x 7 days, warned absolutely no alcohol or he can have a sever reaction. Rx sent to pharmacy.

## 2018-03-07 NOTE — TELEPHONE ENCOUNTER
Patient's wife Brittny Garcia saw her ob/gyn today, Dr. Florinda Parker, and was told she has something that both of them need to be treated for. Dr. Ce Chavira can only treat wife, and told  to call his PCP. Pt's wife asked that you please call her before end of day so she can explain situation -- said it is very important.

## 2018-03-07 NOTE — TELEPHONE ENCOUNTER
Spoke with pt's wife Delores Kraft - she saw her ob/gyn today Dr Cao Favorite - she is being trichomonnas. OB advised her to call husbands pcp to treat him also.  Will forward to MD.

## 2018-03-26 ENCOUNTER — OFFICE VISIT (OUTPATIENT)
Dept: INTERNAL MEDICINE CLINIC | Age: 53
End: 2018-03-26

## 2018-03-26 VITALS
BODY MASS INDEX: 40.26 KG/M2 | HEART RATE: 110 BPM | DIASTOLIC BLOOD PRESSURE: 86 MMHG | WEIGHT: 281.2 LBS | RESPIRATION RATE: 18 BRPM | OXYGEN SATURATION: 96 % | HEIGHT: 70 IN | SYSTOLIC BLOOD PRESSURE: 142 MMHG | TEMPERATURE: 98.4 F

## 2018-03-26 DIAGNOSIS — R09.81 NASAL CONGESTION: ICD-10-CM

## 2018-03-26 DIAGNOSIS — E66.01 OBESITY, MORBID (HCC): Primary | ICD-10-CM

## 2018-03-26 DIAGNOSIS — Z20.2 TRICHOMONAS EXPOSURE: ICD-10-CM

## 2018-03-26 RX ORDER — FLUTICASONE PROPIONATE 50 MCG
2 SPRAY, SUSPENSION (ML) NASAL DAILY
Qty: 1 BOTTLE | Refills: 11
Start: 2018-03-26 | End: 2022-08-06 | Stop reason: ALTCHOICE

## 2018-03-26 NOTE — MR AVS SNAPSHOT
[address] 
[phone] Patient: Elie cShuler MRN: [id number] :1965 Visit Information Date & Time Provider Department Dept. Phone Encounter #  
 3/26/2018  5:05 [facility], [facility] Internal Medicine [phone] [id number] Follow-up Instructions Return if symptoms worsen or fail to improve. Upcoming Health Maintenance Date Due Pneumococcal 19-64 Medium Risk (1 of 1 - PPSV23) 10/24/1982 EYE EXAM RETINAL OR DILATED Q1 7/15/2015 FOOT EXAM Q1 2018 HEMOGLOBIN A1C Q6M 2018 MICROALBUMIN Q1 2018 LIPID PANEL Q1 2018 COLONOSCOPY 2026 DTaP/Tdap/Td series (2 - Td) 2027 Allergies as of 3/26/2018  Review Complete On: 3/26/2018 By: Lucina Michaud MD  
  
 Severity Noted Reaction Type Reactions Egg High 2016    Seizures Current Immunizations  Reviewed on 2017 Name Date  
 TD Vaccine 2007 Tdap 2017 Not reviewed this visit You Were Diagnosed With   
  
 Codes Comments [facility] morbid [facility]    -  Primary ICD-10-CM: E66.01 
ICD-9-CM: 278.01 Nasal congestion     ICD-10-CM: R09.81 ICD-9-CM: 478.19 Trichomonas exposure     ICD-10-CM: Z20.2 ICD-9-CM: V01.6 Vitals BP Pulse Temp Resp Height(growth percentile) Weight(growth percentile) 142/86 (BP 1 Location: Right arm, BP Patient Position: Sitting) (!) 110 98.4 °F (36.9 °C) (Oral) 18 5' 10\" (1.778 m) 281 lb 3.2 oz (127.6 kg) SpO2 BMI Smoking Status 96% 40.35 kg/m2 Never Smoker BMI and BSA Data Body Mass Index Body Surface Area  
 40.35 kg/m 2 2.51 m 2 Preferred Pharmacy Pharmacy Name Phone [place] [address] [address] [phone] Your Updated Medication List  
  
   
This list is accurate as of 3/26/18  6:03 PM.  Always use your most recent med list.  
  
  
  
  
 aspirin 81 mg chewable tablet Take 1 Tab by mouth daily. atorvastatin 40 mg tablet Commonly known as:  LIPITOR  
TAKE 1 TABLET BY MOUTH DAILY. cetirizine 10 mg tablet Commonly known as:  ZYRTEC  
TAKE 1 TABLET BY MOUTH DAILY. * fluticasone 50 mcg/actuation nasal spray Commonly known as:  Colin Remedies 2 Sprays by Both Nostrils route daily. * fluticasone 50 mcg/actuation nasal spray Commonly known as:  Colin Remedies 2 Sprays by Both Nostrils route daily. glimepiride 4 mg tablet Commonly known as:  AMARYL  
TAKE 1 TABLET BY MOUTH EVERY DAY IN THE MORNING, 1 TABLET WITH DINNER- new directions  
  
 glucose blood VI test strips strip Commonly known as:  ACCU-CHEK BRONSON PLUS TEST STRP Check blood sugars three times daily. Dx E11.65.  
  
 meloxicam 15 mg tablet Commonly known as:  MOBIC Take 1 Tab by mouth daily. naphazoline-pheniramine ophthalmic solution Commonly known as:  NAPHCON A Administer 2 Drops to both eyes four (4) times daily as needed. pioglitazone 30 mg tablet Commonly known as:  ACTOS Take 1 Tab by mouth daily. PROAIR HFA 90 mcg/actuation inhaler Generic drug:  albuterol USE 2-4 PUFFS BY MOUTH EVERY 4-6 HOURS FOR 48 HOURS THEN AS NEEDED  
  
 triamterene-hydroCHLOROthiazide 37.5-25 mg per capsule Commonly known as:  Jeralene Hoit TAKE 1 CAPSULE BY MOUTH DAILY * Notice: This list has 2 medication(s) that are the same as other medications prescribed for you. Read the directions carefully, and ask your doctor or other care provider to review them with you. We Performed the Following REFERRAL TO INTERNAL MEDICINE [REF40 Custom] Follow-up Instructions Return if symptoms worsen or fail to improve. Referral Information Referral ID Referred By Referred To  
  
 [id number] NITA TRAN [place]άνδρας MD Hillary   
   [address] [place] [address] Dr Samuels [address] Phone: [phone] Fax: [phone] Visits Status Start Date End Date 1 New Request 3/26/18 3/26/19 If your referral has a status of pending review or denied, additional information will be sent to support the outcome of this decision. Introducing [facility] & HEALTH SERVICES! Woody Lee introduces [employer] Vault patient portal. Now you can access parts of your medical record, email your doctor's office, and request medication refills online. 1. In your internet browser, go to [email or web address] 2. Click on the First Time User? Click Here link in the Sign In box. You will see the New Member Sign Up page. 3. Enter your Digital Vault Access Code exactly as it appears below. You will not need to use this code after youve completed the sign-up process. If you do not sign up before the expiration date, you must request a new code. · Digital Vault Access Code: [email or web address] Expires: 6/24/2018  4:59 PM 
 
4. Enter the last four digits of your Social Security Number (xxxx) and Date of Birth (mm/dd/yyyy) as indicated and click Submit. You will be taken to the next sign-up page. 5. Create a Digital Vault ID. This will be your Digital Vault login ID and cannot be changed, so think of one that is secure and easy to remember. 6. Create a Digital Vault password. You can change your password at any time. 7. Enter your Password Reset Question and Answer. This can be used at a later time if you forget your password. 8. Enter your e-mail address. You will receive e-mail notification when new information is available in [address] 9. Click Sign Up. You can now view and download portions of your medical record. 10. Click the Download Summary menu link to download a portable copy of your medical information. If you have questions, please visit the Frequently Asked Questions section of the Digital Vault website. Remember, Digital Vault is NOT to be used for urgent needs. For medical emergencies, dial 911.  
 
 
Now available from your iPhone and Android! Please provide this summary of care documentation to your next provider. Your primary care clinician is listed as ESTEFANÍA TRAN. If you have any questions after today's visit, please call [phone].

## 2018-03-28 NOTE — PROGRESS NOTES
HISTORY OF PRESENT ILLNESS  Pedro Avila is a 45 y.o. male. HPI Odilon Herrera is seen today for evaluation of STD and follow up of morbid obesity. 1.  Trichomonas exposure. His wife who is present today tested positive for trichomonas. She underwent treatment as did Odilon Herrera. They called me and I sent in a prescription for Flagyl. Neither are symptomatic. Reviewed with them there is no specific testing for men. His wife does have gyn follow up to ensure she has cleared the infection. 2.  Morbid obesity, discussed weight management. We talked about medication options for appetite suppression, referral for medical bariatric evaluation and even surgical bariatric evaluation. He opts for medical consultation with Dr. Jagdish Estrada. Review of  Systems:  Notable for some eyes watering and nasal congestion. Reviewed treatments for this. Review of Systems   Constitutional: Negative for weight loss. HENT: Positive for congestion. Eyes: Positive for discharge. Genitourinary: Negative. Negative for dysuria, frequency and urgency. Physical Exam   Constitutional: No distress. Eyes: Right eye exhibits no discharge and no exudate. Left eye exhibits no discharge and no exudate. Right conjunctiva is injected. Left conjunctiva is injected. Nursing note and vitals reviewed. ASSESSMENT and PLAN  Diagnoses and all orders for this visit:    1. Obesity, morbid ([address]  -     REFERRAL TO INTERNAL MEDICINE    2. Nasal congestion  -     fluticasone (FLONASE) 50 mcg/actuation nasal spray; 2 Sprays by Both Nostrils route daily.  -     naphazoline-pheniramine (NAPHCON A) ophthalmic solution; Administer 2 Drops to both eyes four (4) times daily as needed.     3. Trichomonas exposure- Call with recurrence     Plan was reviewed with patient and family, understanding expressed

## 2018-04-10 ENCOUNTER — OFFICE VISIT (OUTPATIENT)
Dept: BARIATRICS/WEIGHT MGMT | Age: 53
End: 2018-04-10

## 2018-04-10 DIAGNOSIS — E66.01 OBESITY, MORBID (HCC): Primary | ICD-10-CM

## 2018-06-08 DIAGNOSIS — I10 ESSENTIAL HYPERTENSION, BENIGN: ICD-10-CM

## 2018-06-08 DIAGNOSIS — E78.2 MIXED HYPERLIPIDEMIA: ICD-10-CM

## 2018-06-08 RX ORDER — ATORVASTATIN CALCIUM 40 MG/1
TABLET, FILM COATED ORAL
Qty: 90 TAB | Refills: 1 | Status: SHIPPED | COMMUNITY
Start: 2018-06-08 | End: 2018-12-10 | Stop reason: SDUPTHER

## 2018-06-08 RX ORDER — PIOGLITAZONEHYDROCHLORIDE 30 MG/1
30 TABLET ORAL DAILY
Qty: 90 TAB | Refills: 1 | Status: SHIPPED | COMMUNITY
Start: 2018-06-08 | End: 2019-01-11 | Stop reason: SDUPTHER

## 2018-06-08 RX ORDER — METFORMIN HYDROCHLORIDE 500 MG/1
TABLET, EXTENDED RELEASE ORAL
Qty: 360 TAB | Refills: 1 | Status: SHIPPED | COMMUNITY
Start: 2018-06-08 | End: 2019-01-11 | Stop reason: SDUPTHER

## 2018-06-08 RX ORDER — TRIAMTERENE AND HYDROCHLOROTHIAZIDE 37.5; 25 MG/1; MG/1
CAPSULE ORAL
Qty: 30 CAP | Refills: 1 | Status: SHIPPED | COMMUNITY
Start: 2018-06-08 | End: 2018-07-12

## 2018-06-08 RX ORDER — GLIMEPIRIDE 4 MG/1
TABLET ORAL
Qty: 180 TAB | Refills: 1 | Status: SHIPPED | COMMUNITY
Start: 2018-06-08 | End: 2018-07-16 | Stop reason: SDUPTHER

## 2018-06-08 RX ORDER — METRONIDAZOLE 500 MG/1
500 TABLET ORAL 3 TIMES DAILY
Qty: 21 TAB | Refills: 0 | Status: SHIPPED | OUTPATIENT
Start: 2018-06-08 | End: 2018-06-15

## 2018-06-08 NOTE — TELEPHONE ENCOUNTER
In addition to refill requests for atorvastatin 40mg, glimepiride 4mg and metformin ER 500mg, metronidazole 500mg, pioglitazone 30mg, triamterene 37.5mg-hctz 25mg,     ALSO REQUESTING:    BD single use swab (not on med list)  Accu-chek smartview contrl sol (not on med list)  Accu-chek tere smartview meter (not on med list)  Accu-chek fastclix lancets (not on med list)

## 2018-06-12 DIAGNOSIS — E78.2 MIXED HYPERLIPIDEMIA: ICD-10-CM

## 2018-06-12 RX ORDER — ATORVASTATIN CALCIUM 40 MG/1
TABLET, FILM COATED ORAL
Qty: 90 TAB | Refills: 1 | OUTPATIENT
Start: 2018-06-12

## 2018-06-12 RX ORDER — METFORMIN HYDROCHLORIDE 500 MG/1
TABLET, EXTENDED RELEASE ORAL
Qty: 360 TAB | Refills: 1 | OUTPATIENT
Start: 2018-06-12

## 2018-06-12 RX ORDER — GLIMEPIRIDE 4 MG/1
TABLET ORAL
Qty: 180 TAB | Refills: 1 | OUTPATIENT
Start: 2018-06-12

## 2018-06-13 RX ORDER — BLOOD-GLUCOSE METER
EACH MISCELLANEOUS
Qty: 1 EACH | Refills: 0 | COMMUNITY
Start: 2018-06-13

## 2018-06-13 RX ORDER — INSULIN PUMP SYRINGE, 3 ML
EACH MISCELLANEOUS
COMMUNITY
Start: 2018-06-13

## 2018-06-13 NOTE — TELEPHONE ENCOUNTER
Isac Cano, please call Josephine Arrieta and determine the proper way to order new meter and all supplies needed

## 2018-06-13 NOTE — TELEPHONE ENCOUNTER
Called and spoke to the pt regarding fax from [facility] mail order  Requesting new scripts for BD single use swab and accu-chek smartview contrl sol. He states [facility] has recommend he get a new script for a glucometer and test strips- his current glucometer is 7-8 yrs old. Please advise what the correct qty # and directions should be for the control solution and BD single use swab.

## 2018-06-14 RX ORDER — LANCETS
EACH MISCELLANEOUS
Qty: 300 EACH | Refills: 3 | Status: SHIPPED | COMMUNITY
Start: 2018-06-14 | End: 2019-07-12 | Stop reason: SDUPTHER

## 2018-06-14 RX ORDER — BLOOD-GLUCOSE METER
EACH MISCELLANEOUS
Qty: 1 EACH | Refills: 0 | Status: SHIPPED | COMMUNITY
Start: 2018-06-14 | End: 2019-07-12 | Stop reason: SDUPTHER

## 2018-06-14 NOTE — TELEPHONE ENCOUNTER
Spoke with Asif Nava with [facility] mail order pharmacy - advised him MD wants to know which glucose meter and supplies best for pt. He advised me that they sent pt Accu-Chek Lianet Plus Test Strips last week. Should order a Accu-Chek Lianet Plus Meter and Accu-Chek SoftClix lancets. Order sent. Spoke with to to let him know MD has order new meter and lancets. Advised him Jacquelin told me they sent test strips last week. He states he has not received them. Advised will order so he will have all he needs to check his blood sugars. Sent in  Test strips.

## 2018-06-19 DIAGNOSIS — E11.9 CONTROLLED TYPE 2 DIABETES MELLITUS WITHOUT COMPLICATION, UNSPECIFIED WHETHER LONG TERM INSULIN USE (HCC): Primary | ICD-10-CM

## 2018-06-19 DIAGNOSIS — E78.2 MIXED HYPERLIPIDEMIA: ICD-10-CM

## 2018-06-19 RX ORDER — ATORVASTATIN CALCIUM 40 MG/1
TABLET, FILM COATED ORAL
Qty: 90 TAB | Refills: 1 | OUTPATIENT
Start: 2018-06-19

## 2018-06-19 RX ORDER — METFORMIN HYDROCHLORIDE 500 MG/1
TABLET, EXTENDED RELEASE ORAL
Qty: 360 TAB | Refills: 1 | OUTPATIENT
Start: 2018-06-19

## 2018-06-19 RX ORDER — GLIMEPIRIDE 4 MG/1
TABLET ORAL
Qty: 180 TAB | Refills: 1 | OUTPATIENT
Start: 2018-06-19

## 2018-06-19 RX ORDER — ISOPROPYL ALCOHOL 70 ML/100ML
SWAB TOPICAL
Qty: 100 PAD | Refills: 11 | Status: SHIPPED | OUTPATIENT
Start: 2018-06-19

## 2018-06-19 RX ORDER — INSULIN PUMP SYRINGE, 3 ML
EACH MISCELLANEOUS
Qty: 2.5 ML | Refills: 1 | Status: SHIPPED | OUTPATIENT
Start: 2018-06-19 | End: 2019-07-12 | Stop reason: SDUPTHER

## 2018-06-19 NOTE — TELEPHONE ENCOUNTER
Received [address] prescription clarification requesting patient prefers Accu-chek smartview strips, alcohol swabs and controls. Sent preferred items.

## 2018-06-19 NOTE — TELEPHONE ENCOUNTER
Pt. Notified and scheduled for 7/12/18 9am, instructed to bring his BS readings, and be fasting for labs. Pt. States he has plenty of medication to last until seen.

## 2018-07-12 ENCOUNTER — OFFICE VISIT (OUTPATIENT)
Dept: INTERNAL MEDICINE CLINIC | Age: 53
End: 2018-07-12

## 2018-07-12 VITALS
SYSTOLIC BLOOD PRESSURE: 132 MMHG | OXYGEN SATURATION: 97 % | RESPIRATION RATE: 18 BRPM | TEMPERATURE: 98.3 F | HEIGHT: 70 IN | WEIGHT: 270.4 LBS | HEART RATE: 89 BPM | BODY MASS INDEX: 38.71 KG/M2 | DIASTOLIC BLOOD PRESSURE: 90 MMHG

## 2018-07-12 DIAGNOSIS — E11.9 CONTROLLED TYPE 2 DIABETES MELLITUS WITHOUT COMPLICATION, UNSPECIFIED WHETHER LONG TERM INSULIN USE (HCC): Primary | ICD-10-CM

## 2018-07-12 DIAGNOSIS — E66.01 OBESITY, MORBID (HCC): ICD-10-CM

## 2018-07-12 DIAGNOSIS — I10 ESSENTIAL HYPERTENSION, BENIGN: ICD-10-CM

## 2018-07-12 DIAGNOSIS — M17.2 POST-TRAUMATIC OSTEOARTHRITIS OF BOTH KNEES: ICD-10-CM

## 2018-07-12 DIAGNOSIS — E78.2 MIXED HYPERLIPIDEMIA: ICD-10-CM

## 2018-07-12 RX ORDER — LOSARTAN POTASSIUM AND HYDROCHLOROTHIAZIDE 12.5; 5 MG/1; MG/1
1 TABLET ORAL DAILY
Qty: 90 TAB | Refills: 3 | Status: SHIPPED | OUTPATIENT
Start: 2018-07-12 | End: 2019-07-12 | Stop reason: SDUPTHER

## 2018-07-12 NOTE — PROGRESS NOTES
HISTORY OF PRESENT ILLNESS  Mali Irwin is a 45 y.o. male. \[facility]\"" Joann Chen is seen today for follow up of hypertension and other problems. 1. Hypertension, fair readings and improved on recheck, but still not quite optimal. Will follow for now with lifestyle changes. 2. Hyperlipidemia, diabetes. Due for routine lab recheck. It sounds as if he has been more compliant with his medications. 3. Knee pain. Pain is about 10/10 in intensity. This started a week ago. He felt a pop and then the knee swelled. He has tried Aleve. I have asked him to get in to see his orthopedist right away. 4. Preventive medicine. A Medicare Wellness Visit in 2 months. Social history notable for him being on disability at this time and has gone on to a Medicare Replacement Product for his primary insurance. Review of Systems   Constitutional: Negative for weight loss. Respiratory: Negative. Cardiovascular: Negative for chest pain, palpitations, leg swelling and PND. Musculoskeletal: Positive for joint pain. Negative for myalgias. Neurological: Negative for focal weakness. Physical Exam   Constitutional: No distress. Neck: Carotid bruit is not present. Cardiovascular: Normal rate and regular rhythm. Exam reveals no gallop and no friction rub. No murmur heard. Pulmonary/Chest: Effort normal and breath sounds normal. No respiratory distress. Musculoskeletal: He exhibits no edema. Right knee: He exhibits swelling. He exhibits normal range of motion, no effusion, no deformity and no erythema. Legs:  Nursing note and vitals reviewed. ASSESSMENT and PLAN  Diagnoses and all orders for this visit:    1. Controlled type 2 diabetes mellitus without complication, unspecified whether long term insulin use (HCC)  -     HEMOGLOBIN A1C WITH EAG  -     REFERRAL TO OPHTHALMOLOGY  -     REFERRAL TO PODIATRY    2. Obesity, morbid ([facility]  -     METABOLIC PANEL, COMPREHENSIVE  -     LIPID PANEL    3. Post-traumatic osteoarthritis of both knees  -     REFERRAL TO ORTHOPEDICS    4. Mixed hyperlipidemia  -     METABOLIC PANEL, COMPREHENSIVE  -     LIPID PANEL    5. Essential hypertension, benign  -     losartan-hydroCHLOROthiazide (HYZAAR) 50-12.5 mg per tablet; Take 1 Tab by mouth daily.  Replaces triamterene  -     METABOLIC PANEL, COMPREHENSIVE  -     LIPID PANEL

## 2018-07-12 NOTE — PROGRESS NOTES
Chief Complaint   Patient presents with    Diabetes     1. Have you been to the ER, urgent care clinic since your last visit? Hospitalized since your last visit? No    2. Have you seen or consulted any other health care providers outside of the [address] [place] [address] since your last visit? Include any pap smears or colon screening.  No

## 2018-07-12 NOTE — MR AVS SNAPSHOT
[address] [place] [address] 
[phone] Patient: Cely Chowdary MRN: [id number] :1965 Visit Information Date & Time Provider Department Dept. Phone Encounter #  
 2018  9:20 AM Tuan Fleming, [address] [place] [address] Internal Medicine [phone] [id number] Follow-up Instructions Return in about 3 months (around 10/12/2018) for mwv. Upcoming Health Maintenance Date Due Pneumococcal 19-64 Medium Risk (1 of 1 - PPSV23) 10/24/1982 EYE EXAM RETINAL OR DILATED Q1 7/15/2015 FOOT EXAM Q1 2018 HEMOGLOBIN A1C Q6M 2018 Influenza Age 5 to Adult 2018 MICROALBUMIN Q1 2018 LIPID PANEL Q1 2018 COLONOSCOPY 2026 DTaP/Tdap/Td series (2 - Td) 2027 Allergies as of 2018  Review Complete On: 2018 By: Tuan Fleming MD  
  
 Severity Noted Reaction Type Reactions Egg High 2016    Seizures Current Immunizations  Reviewed on 2017 Name Date  
 TD Vaccine 2007 Tdap 2017 Not reviewed this visit You Were Diagnosed With   
  
 Codes Comments Controlled type 2 diabetes mellitus without complication, unspecified whether long term insulin use ([facility]    -  Primary ICD-10-CM: E11.9 ICD-9-CM: 250.00 Obesity, morbid ([facility]     ICD-10-CM: E66.01 
ICD-9-CM: 278.01 Post-traumatic osteoarthritis of both knees     ICD-10-CM: M17.2 ICD-9-CM: 715.26 Mixed hyperlipidemia     ICD-10-CM: E78.2 ICD-9-CM: 272.2 Essential hypertension, benign     ICD-10-CM: I10 
ICD-9-CM: 401.1 Vitals BP Pulse Temp Resp Height(growth percentile) Weight(growth percentile) 132/90 (BP 1 Location: Right arm, BP Patient Position: Sitting) 89 98.3 °F (36.8 °C) (Oral) 18 5' 10\" (1.778 m) 270 lb 6.4 oz (122.7 kg) SpO2 BMI Smoking Status 97% 38.8 kg/m2 Never Smoker Vitals History BMI and BSA Data  Body Mass Index Body Surface Area  
 38.8 kg/m 2 2.46 m 2 Preferred Pharmacy Pharmacy Name Phone [facility] [address] [place] [address] [facility] [address] [place] [address] [phone] Your Updated Medication List  
  
   
This list is accurate as of 7/12/18 10:06 AM.  Always use your most recent med list.  
  
  
  
  
 alcohol swabs Padm Commonly known as:  BD Single Use Swabs Regular Check blood sugars three times daily. Dx E11.65.  
  
 aspirin 81 mg chewable tablet Take 1 Tab by mouth daily. atorvastatin 40 mg tablet Commonly known as:  LIPITOR  
TAKE 1 TABLET BY MOUTH DAILY. Blood Glucose Control, Normal Soln Commonly known as:  ACCU-CHEK SMARTVIEW CONTRL SOL Check blood sugars three times daily. Dx E11.65. Blood-Glucose Meter Misc Commonly known as:  ACCU-CHEK BRONSON PLUS METER Check blood sugars three times daily. Dx E11.65.  
  
 cetirizine 10 mg tablet Commonly known as:  ZYRTEC  
TAKE 1 TABLET BY MOUTH DAILY. * fluticasone 50 mcg/actuation nasal spray Commonly known as:  Domnick Means 2 Sprays by Both Nostrils route daily. * fluticasone 50 mcg/actuation nasal spray Commonly known as:  Domnick Means 2 Sprays by Both Nostrils route daily. glimepiride 4 mg tablet Commonly known as:  AMARYL  
TAKE 1 TABLET BY MOUTH EVERY DAY IN THE MORNING, 1 TABLET WITH DINNER- new directions * glucose blood VI test strips strip Commonly known as:  ACCU-CHEK BRONSON PLUS TEST STRP Check blood sugars three times daily. Dx E11.65.  
  
 * glucose blood VI test strips strip Commonly known as:  [address] Check blood sugars three times daily. Dx E11.65. Lancets Misc Commonly known as:  ACCU-CHEK SOFTCLIX LANCETS Check blood sugars three times daily. Dx E11.65. losartan-hydroCHLOROthiazide 50-12.5 mg per tablet Commonly known as:  HYZAAR Take 1 Tab by mouth daily.  Replaces triamterene  
  
 meloxicam 15 mg tablet Commonly known as:  MOBIC Take 1 Tab by mouth daily. metFORMIN  mg tablet Commonly known as:  GLUCOPHAGE XR  
TAKE 2 TABLETS BY MOUTH EVERY DAY WITH BREAKFAST, 2 WITH  DINNER- new directions  
  
 naphazoline-pheniramine ophthalmic solution Commonly known as:  NAPHCON A Administer 2 Drops to both eyes four (4) times daily as needed. pioglitazone 30 mg tablet Commonly known as:  ACTOS Take 1 Tab by mouth daily. PROAIR HFA 90 mcg/actuation inhaler Generic drug:  albuterol USE 2-4 PUFFS BY MOUTH EVERY 4-6 HOURS FOR 48 HOURS THEN AS NEEDED * Notice: This list has 4 medication(s) that are the same as other medications prescribed for you. Read the directions carefully, and ask your doctor or other care provider to review them with you. Prescriptions Sent to Pharmacy Refills  
 losartan-hydroCHLOROthiazide (HYZAAR) 50-12.5 mg per tablet 3 Sig: Take 1 Tab by mouth daily. Replaces triamterene Class: Normal  
 Pharmacy: [address] [place] [address], [address] [place] [address] #: [phone] Route: Oral  
  
We Performed the Following HEMOGLOBIN A1C WITH EAG [44254 CPT(R)] LIPID PANEL [01762 CPT(R)] METABOLIC PANEL, COMPREHENSIVE [80036 CPT(R)] REFERRAL TO OPHTHALMOLOGY [REF57 Custom] REFERRAL TO ORTHOPEDICS [WMW606 Custom] REFERRAL TO PODIATRY [REF90 Custom] Comments:  
 Please evaluate patient for diabetic nail care Follow-up Instructions Return in about 3 months (around 10/12/2018) for mwv. Referral Information Referral ID Referred By Referred To  
  
 [id number] Munir TRAN1 [facility], [place]   
   [address] [facility], [address] [facility] Phone: [phone] Fax: [phone] Visits Status Start Date End Date 1 New Request 7/12/18 7/12/19  If your referral has a status of pending review or denied, additional information will be sent to support the outcome of this decision. Referral ID Referred By Referred To  
 [id number] CHELSEAKeara Abram, DPM  
   [address] [facility] [address] [place] [address] [place] Phone: [phone] Visits Status Start Date End Date 1 New Request 7/12/18 7/12/19 If your referral has a status of pending review or denied, additional information will be sent to support the outcome of this decision. Referral ID Referred By Referred To  
 [id number] Romel TRAN MD  
   [address] [facility] Phone: [phone] Fax: [phone] Visits Status Start Date End Date 1 New Request 7/12/18 7/12/19 If your referral has a status of pending review or denied, additional information will be sent to support the outcome of this decision. Introducing [facility] [place] [facility] SERVICES! Raji Renteria introduces [employer] patient portal. Now you can access parts of your medical record, email your doctor's office, and request medication refills online. 1. In your internet browser, go to [email or web address] 2. Click on the First Time User? Click Here link in the Sign In box. You will see the New Member Sign Up page. 3. Enter your [employer] Access Code exactly as it appears below. You will not need to use this code after youve completed the sign-up process. If you do not sign up before the expiration date, you must request a new code. · [employer] Access Code: [email or web address] Expires: 10/10/2018 10:06 AM 
 
4. Enter the last four digits of your Social Security Number (xxxx) and Date of Birth (mm/dd/yyyy) as indicated and click Submit. You will be taken to the next sign-up page. 5. Create a Blaze Biosciencehart ID. This will be your [employer] login ID and cannot be changed, so think of one that is secure and easy to remember. 6. Create a [employer] password. You can change your password at any time.  
7. Enter your Password Reset Question and Answer. This can be used at a later time if you forget your password. 8. Enter your e-mail address. You will receive e-mail notification when new information is available in [address] 9. Click Sign Up. You can now view and download portions of your medical record. 10. Click the Download Summary menu link to download a portable copy of your medical information. If you have questions, please visit the Frequently Asked Questions section of the [employer] website. Remember, [employer] is NOT to be used for urgent needs. For medical emergencies, dial 911. Now available from your iPhone and Android! Please provide this summary of care documentation to your next provider. Your primary care clinician is listed as ESTEFANÍA TRAN. If you have any questions after today's visit, please call [phone].

## 2018-07-13 LAB
ALBUMIN SERPL-MCNC: 4.7 G/DL (ref 3.5–5.5)
ALBUMIN/GLOB SERPL: 1.9 {RATIO} (ref 1.2–2.2)
ALP SERPL-CCNC: 74 IU/L (ref 39–117)
ALT SERPL-CCNC: 17 IU/L (ref 0–44)
AST SERPL-CCNC: 12 IU/L (ref 0–40)
BILIRUB SERPL-MCNC: 0.5 MG/DL (ref 0–1.2)
BUN SERPL-MCNC: 16 MG/DL (ref 6–24)
BUN/CREAT SERPL: 19 (ref 9–20)
CALCIUM SERPL-MCNC: 9.5 MG/DL (ref 8.7–10.2)
CHLORIDE SERPL-SCNC: 102 MMOL/L (ref 96–106)
CHOLEST SERPL-MCNC: 132 MG/DL (ref 100–199)
CO2 SERPL-SCNC: 23 MMOL/L (ref 20–29)
CREAT SERPL-MCNC: 0.85 MG/DL (ref 0.76–1.27)
EST. AVERAGE GLUCOSE BLD GHB EST-MCNC: 212 MG/DL
GLOBULIN SER CALC-MCNC: 2.5 G/DL (ref 1.5–4.5)
GLUCOSE SERPL-MCNC: 104 MG/DL (ref 65–99)
HBA1C MFR BLD: 9 % (ref 4.8–5.6)
HDLC SERPL-MCNC: 40 MG/DL
LDLC SERPL CALC-MCNC: 76 MG/DL (ref 0–99)
POTASSIUM SERPL-SCNC: 4.4 MMOL/L (ref 3.5–5.2)
PROT SERPL-MCNC: 7.2 G/DL (ref 6–8.5)
SODIUM SERPL-SCNC: 142 MMOL/L (ref 134–144)
TRIGL SERPL-MCNC: 81 MG/DL (ref 0–149)
VLDLC SERPL CALC-MCNC: 16 MG/DL (ref 5–40)

## 2018-07-14 NOTE — PROGRESS NOTES
Call- cholesterol is excellent, Continue current regimen of prescription and / or OTC medications . - diabetes is better, still fair control only.  Increase glimepiride to 4 mg bid, Continue other medications in addition to current changes

## 2018-07-16 RX ORDER — GLIMEPIRIDE 4 MG/1
TABLET ORAL
Qty: 180 TAB | Refills: 1 | Status: SHIPPED | COMMUNITY
Start: 2018-07-16 | End: 2019-07-12 | Stop reason: SDUPTHER

## 2018-07-16 NOTE — PROGRESS NOTES
Advised pt his cholesterol is excellent - continue current regimen of prescription and / or OTC medications. His diabetes is better but still fair control only. Increase glimepiride to 4 mg bid. Continue other medications in addition to current changes.

## 2018-07-16 NOTE — TELEPHONE ENCOUNTER
When sending in pt's med - noticed directions had - TAKE 1 TABLET BY MOUTH EVERY DAY IN THE MORNING, 1 TABLET WITH DINNER- new directions from refill back on 6/8/18. Called pt back to confirm what he was taking. He states he was only taking one daily.  Will forward to MD.

## 2018-07-19 RX ORDER — GLIMEPIRIDE 4 MG/1
TABLET ORAL
Qty: 180 TAB | Refills: 1 | OUTPATIENT
Start: 2018-07-19

## 2018-07-19 NOTE — TELEPHONE ENCOUNTER
Ordered 7/16/18 per [facility] \"please clarify directions. New directions or same as previous? \"    Phone: [phone]

## 2018-07-19 NOTE — TELEPHONE ENCOUNTER
Spoke with Pauline, pharmacist with [facility] - she wanted to know what \"new directions\" were. Advised her pt had been taking one daily - MD has changed to bid.

## 2018-08-22 ENCOUNTER — TELEPHONE (OUTPATIENT)
Dept: INTERNAL MEDICINE CLINIC | Age: 53
End: 2018-08-22

## 2018-08-22 NOTE — TELEPHONE ENCOUNTER
On call: Patient called stating that he is having headaches on and offsince his blood pressure medication was changed to losartan hydrochlorothiazide a few weeks ago. He currently has a headache as 7 out of 10 is taking BC powder. No other symptoms. Has not checked his blood pressure but does have a blood pressure cuff at home and is asked to check his blood pressure. He is instructed to call office tomorrow to make an appointment to see his primary care physician. He will call me back tonight if his blood pressures are elevated.

## 2018-08-23 ENCOUNTER — TELEPHONE (OUTPATIENT)
Dept: INTERNAL MEDICINE CLINIC | Age: 53
End: 2018-08-23

## 2018-08-23 NOTE — TELEPHONE ENCOUNTER
----- Message from Jim Frye sent at 8/23/2018  7:34 AM EDT -----  Regarding: acs        bp meds    Pelon Wayne  Male, 45 y.o., 1965  Weight:   270 lb 6.4 oz (122.7 kg)  Phone:   [phone]  PCP:   Prachi Pollock MD  MRN:   [id number]  MyChart:   Pending  Next Appt (With Me)  None  Next Appt (Any Provider)  11/07/2018    [id number]  Received: Jamel Norton [facility] Front Office Pool                 Richard Grant, wife, is advising that the change in pt's BP medicine seems to be having an adverse effect on him, he is experiencing headaches. Needs to know of he should go back to his original medication.      Best contact # [phone] (Wife) [phone] (Pt)

## 2018-08-23 NOTE — TELEPHONE ENCOUNTER
Spoke with pt - states he started new BP med at last appt 7/12/18 - Hyzaar. For past 2 weeks has been experiencing headache intermittently. Denies any other symptoms. Has not checked BP. Offered appt today to see MD - he could not come in today. Scheduled on 8/27/18 for further evaluation. Advised him if symptoms worsens prior to appt he should go to ER.  Will forward to MD.

## 2018-08-23 NOTE — TELEPHONE ENCOUNTER
Left detailed message on pt's personal vm that MD recommends he get an outside BP monitor. Check several over next few days and bring in readings to appt. Also spoke with pt's wife Jerica Self (on [facility]) advised her of message left for pt. She states she believes they have a BP monitor at home.

## 2018-08-23 NOTE — TELEPHONE ENCOUNTER
----- Message from [employer] sent at 8/23/2018  7:34 AM EDT -----  Regarding: acs        bp meds    Tripp Solomon  Male, 45 y.o., 1965  Weight:   270 lb 6.4 oz (122.7 kg)  Phone:   [phone]  PCP:   Nancy Ramirez MD  MRN:   [id number]  MyChart:   Pending  Next Appt (With Me)  None  Next Appt (Any Provider)  11/07/2018    [id number]/Telephone  Received: Santos Fofana [facility] Front Office Pool                 Hannah Martinez, wife, is advising that the change in pt's BP medicine seems to be having an adverse effect on him, he is experiencing headaches. Needs to know of he should go back to his original medication.      Best contact # [phone] (Wife) [phone] (Pt)

## 2018-08-27 ENCOUNTER — OFFICE VISIT (OUTPATIENT)
Dept: INTERNAL MEDICINE CLINIC | Age: 53
End: 2018-08-27

## 2018-08-27 VITALS
WEIGHT: 277 LBS | TEMPERATURE: 98 F | HEIGHT: 70 IN | RESPIRATION RATE: 17 BRPM | DIASTOLIC BLOOD PRESSURE: 70 MMHG | BODY MASS INDEX: 39.65 KG/M2 | SYSTOLIC BLOOD PRESSURE: 106 MMHG | OXYGEN SATURATION: 98 % | HEART RATE: 104 BPM

## 2018-08-27 DIAGNOSIS — G47.33 OBSTRUCTIVE SLEEP APNEA SYNDROME: ICD-10-CM

## 2018-08-27 DIAGNOSIS — I10 ESSENTIAL HYPERTENSION, BENIGN: Primary | ICD-10-CM

## 2018-08-27 NOTE — MR AVS SNAPSHOT
[address] [place] [address] 
[phone] Patient: Marcus Brian MRN: [id number] :1965 Visit Information Date & Time Provider Department Dept. Phone Encounter #  
 2018  3:25 PM Mike Ceballos, [facility] Internal Medicine [phone] [id number] Follow-up Instructions Return in about 2 months (around 2018). Your Appointments 2018 10:00 AM  
Complete Physical with Mike Ceballos MD  
[facility] Internal Medicine [address] [facility]) Appt Note: cpe  
 [address] [facility] [address] [place] [address] [place] [address]  
[address] [id number] [place] [address] Upcoming Health Maintenance Date Due Pneumococcal 19-64 Medium Risk (1 of 1 - PPSV23) 10/24/1982 EYE EXAM RETINAL OR DILATED Q1 7/15/2015 FOOT EXAM Q1 2018 Influenza Age 5 to Adult 2018 MICROALBUMIN Q1 2018 HEMOGLOBIN A1C Q6M 2019 LIPID PANEL Q1 2019 COLONOSCOPY 2026 DTaP/Tdap/Td series (2 - Td) 2027 Allergies as of 2018  Review Complete On: 2018 By: Mike Ceballos MD  
  
 Severity Noted Reaction Type Reactions Egg High 2016    Seizures Current Immunizations  Reviewed on 2017 Name Date  
 TD Vaccine 2007 Tdap 2017 Not reviewed this visit You Were Diagnosed With   
  
 Codes Comments Essential hypertension, benign    -  Primary ICD-10-CM: I10 
ICD-9-CM: 401.1 Obstructive sleep apnea syndrome     ICD-10-CM: G47.33 
ICD-9-CM: 327.23 Vitals BP Pulse Temp Resp Height(growth percentile) Weight(growth percentile) 106/70 (!) 104 98 °F (36.7 °C) (Oral) 17 5' 10\" (1.778 m) 277 lb (125.6 kg) SpO2 BMI Smoking Status 98% 39.75 kg/m2 Never Smoker Vitals History BMI and BSA Data Body Mass Index Body Surface Area  39.75 kg/m 2 2.49 m 2  
  
 Preferred Pharmacy Pharmacy Name Phone [facility] [address] [place] [address] [address] [facility] [address] [place] [address] [phone] Your Updated Medication List  
  
   
This list is accurate as of 8/27/18  4:34 PM.  Always use your most recent med list.  
  
  
  
  
 alcohol swabs Padm Commonly known as:  BD Single Use Swabs Regular Check blood sugars three times daily. Dx E11.65.  
  
 aspirin 81 mg chewable tablet Take 1 Tab by mouth daily. atorvastatin 40 mg tablet Commonly known as:  LIPITOR  
TAKE 1 TABLET BY MOUTH DAILY. Blood Glucose Control, Normal Soln Commonly known as:  ACCU-CHEK SMARTVIEW CONTRL SOL Check blood sugars three times daily. Dx E11.65. Blood-Glucose Meter Misc Commonly known as:  ACCU-CHEK BRONSON PLUS METER Check blood sugars three times daily. Dx E11.65.  
  
 cetirizine 10 mg tablet Commonly known as:  ZYRTEC  
TAKE 1 TABLET BY MOUTH DAILY. fluticasone 50 mcg/actuation nasal spray Commonly known as:  Lindalee [place] 2 Sprays by Both Nostrils route daily. glimepiride 4 mg tablet Commonly known as:  AMARYL  
TAKE 1 TABLET BY MOUTH EVERY DAY IN THE MORNING, 1 TABLET WITH DINNER- new directions * glucose blood VI test strips strip Commonly known as:  ACCU-CHEK BRONSON PLUS TEST STRP Check blood sugars three times daily. Dx E11.65.  
  
 * glucose blood VI test strips strip Commonly known as:  [address] Check blood sugars three times daily. Dx E11.65. Lancets Misc Commonly known as:  ACCU-CHEK SOFTCLIX LANCETS Check blood sugars three times daily. Dx E11.65. losartan-hydroCHLOROthiazide 50-12.5 mg per tablet Commonly known as:  HYZAAR Take 1 Tab by mouth daily. Replaces triamterene  
  
 metFORMIN  mg tablet Commonly known as:  GLUCOPHAGE XR  
TAKE 2 TABLETS BY MOUTH EVERY DAY WITH BREAKFAST, 2 WITH  DINNER- new directions  
  
 pioglitazone 30 mg tablet Commonly known as:  ACTOS Take 1 Tab by mouth daily. PROAIR HFA 90 mcg/actuation inhaler Generic drug:  albuterol USE 2-4 PUFFS BY MOUTH EVERY 4-6 HOURS FOR 48 HOURS THEN AS NEEDED * Notice: This list has 2 medication(s) that are the same as other medications prescribed for you. Read the directions carefully, and ask your doctor or other care provider to review them with you. We Performed the Following REFERRAL TO SLEEP STUDIES [REF99 Custom] Follow-up Instructions Return in about 2 months (around 11/7/2018). Referral Information Referral ID Referred By Referred To  
  
 [id number] CHELSEA, [address] [place],[address], [place] Juan[address]ablo Tavares [address] [facility], [address] Phone: [phone] Fax: [phone] Visits Status Start Date End Date 1 New Request 8/27/18 8/27/19 If your referral has a status of pending review or denied, additional information will be sent to support the outcome of this decision. Introducing [facility] HEALTH SERVICES! [facility] introduces [employer] patient portal. Now you can access parts of your medical record, email your doctor's office, and request medication refills online. 1. In your internet browser, go to [email or web address] 2. Click on the First Time User? Click Here link in the Sign In box. You will see the New Member Sign Up page. 3. Enter your [employer] Access Code exactly as it appears below. You will not need to use this code after youve completed the sign-up process. If you do not sign up before the expiration date, you must request a new code. · [employer] Access Code: [email or web address] Expires: 10/10/2018 10:06 AM 
 
4. Enter the last four digits of your Social Security Number (xxxx) and Date of Birth (mm/dd/yyyy) as indicated and click Submit. You will be taken to the next sign-up page. 5. Create a [employer] ID.  This will be your [employer] login ID and cannot be changed, so think of one that is secure and easy to remember. 6. Create a [employer] password. You can change your password at any time. 7. Enter your Password Reset Question and Answer. This can be used at a later time if you forget your password. 8. Enter your e-mail address. You will receive e-mail notification when new information is available in [address] 9. Click Sign Up. You can now view and download portions of your medical record. 10. Click the Download Summary menu link to download a portable copy of your medical information. If you have questions, please visit the Frequently Asked Questions section of the [employer] website. Remember, [employer] is NOT to be used for urgent needs. For medical emergencies, dial 911. Now available from your iPhone and Android! Please provide this summary of care documentation to your next provider. Your primary care clinician is listed as ESTEFANÍA TRAN. If you have any questions after today's visit, please call [phone].

## 2018-08-27 NOTE — PROGRESS NOTES
HISTORY OF PRESENT ILLNESS  Thomas Kitchen is a 45 y.o. male. DAVID Segovia is seen today for follow up of hypertension and evaluation of headache. 1. Hypertension. Readings are better. We changed his medication due to Valsartan recall. 2. Headaches. Occurred initially after the changeover, but have essentially resolved now. He does believe he may have sleep apnea also as a possible cause of headaches. He was previously evaluated at the [address] [place] [address] but would prefer to be evaluated in the community. Review of systems:  Notable for some transient leg swelling after eating mashed potatoes. This was 48 hours ago and he is back to baseline now. Review of Systems   Cardiovascular: Positive for leg swelling. Musculoskeletal: Positive for joint pain. Neurological: Positive for headaches. Physical Exam   Constitutional: No distress. Cardiovascular: Normal rate and regular rhythm. Exam reveals no gallop and no friction rub. No murmur heard. Pulmonary/Chest: Effort normal and breath sounds normal.   Musculoskeletal: He exhibits no edema. Nursing note and vitals reviewed. ASSESSMENT and PLAN  Diagnoses and all orders for this visit:    1. Essential hypertension, benign- Continue current regimen of prescription and / or OTC medications     2.  Obstructive sleep apnea syndrome  -     REFERRAL TO SLEEP STUDIES

## 2018-12-10 DIAGNOSIS — E78.2 MIXED HYPERLIPIDEMIA: ICD-10-CM

## 2018-12-11 RX ORDER — ATORVASTATIN CALCIUM 40 MG/1
TABLET, FILM COATED ORAL
Qty: 90 TAB | Refills: 0 | Status: SHIPPED | OUTPATIENT
Start: 2018-12-11 | End: 2019-01-11 | Stop reason: SDUPTHER

## 2018-12-11 NOTE — TELEPHONE ENCOUNTER
Per verbal order Dr. Andrew Gonzalez refill sent to pharmacy    Message left for patient, advising due for office visit.

## 2018-12-24 ENCOUNTER — TELEPHONE (OUTPATIENT)
Dept: INTERNAL MEDICINE CLINIC | Age: 53
End: 2018-12-24

## 2018-12-24 ENCOUNTER — HOSPITAL ENCOUNTER (OUTPATIENT)
Dept: ULTRASOUND IMAGING | Age: 53
Discharge: HOME | End: 2018-12-24
Attending: INTERNAL MEDICINE
Payer: MEDICARE

## 2018-12-24 ENCOUNTER — OFFICE VISIT (OUTPATIENT)
Dept: INTERNAL MEDICINE CLINIC | Age: 53
End: 2018-12-24

## 2018-12-24 VITALS
TEMPERATURE: 98.2 F | RESPIRATION RATE: 18 BRPM | SYSTOLIC BLOOD PRESSURE: 148 MMHG | BODY MASS INDEX: 40.69 KG/M2 | DIASTOLIC BLOOD PRESSURE: 102 MMHG | WEIGHT: 284.2 LBS | HEIGHT: 70 IN | HEART RATE: 88 BPM | OXYGEN SATURATION: 96 %

## 2018-12-24 DIAGNOSIS — R60.0 BILATERAL LEG EDEMA: ICD-10-CM

## 2018-12-24 PROCEDURE — 93970 EXTREMITY STUDY: CPT

## 2018-12-24 RX ORDER — FUROSEMIDE 40 MG/1
40 TABLET ORAL DAILY
Qty: 14 TAB | Refills: 0 | Status: SHIPPED | OUTPATIENT
Start: 2018-12-24 | End: 2019-01-03 | Stop reason: SDUPTHER

## 2018-12-24 NOTE — PROGRESS NOTES
HISTORY OF PRESENT ILLNESS  Jacob Shah is a 52 y.o. male. Leg Pain    The history is provided by the patient and spouse. This is a chronic problem. The problem has been gradually worsening. The pain is present in the left lower leg and right lower leg. The pain is at a severity of 7/10. Associated symptoms include limited range of motion and stiffness. Pertinent negatives include no numbness and no tingling. Associated symptoms comments: Bilateral swelling, with pain. Chronic but worsening. No sx of infection. The symptoms are aggravated by standing and activity. He has tried nothing for the symptoms. There has been no history of extremity trauma. personal hx bilateral knee OA   Diabetes   The history is provided by the patient (overdue for follow up, I ? potential side effect of Actos). This is a chronic problem. Pertinent negatives include no chest pain. Review of Systems   Constitutional: Negative for chills, fever and weight loss. Respiratory: Negative. Cardiovascular: Positive for leg swelling. Negative for chest pain, palpitations and PND. Musculoskeletal: Positive for joint pain and stiffness. Negative for myalgias. Neurological: Negative for tingling, focal weakness and numbness. Physical Exam   Constitutional: No distress. Cardiovascular: Normal rate and regular rhythm. Exam reveals no gallop and no friction rub. No murmur heard. Pulmonary/Chest: Effort normal and breath sounds normal.   Musculoskeletal: He exhibits edema. Moderate bilateral lower extremity edema , symmetric . No cellulitis   Nursing note and vitals reviewed. ASSESSMENT and PLAN  Diagnoses and all orders for this visit:    1. Uncontrolled type 2 diabetes mellitus without complication, without long-term current use of insulin (HCC)  -     METABOLIC PANEL, COMPREHENSIVE  -     HEMOGLOBIN A1C WITH EAG    2.  Bilateral leg edema  -     METABOLIC PANEL, COMPREHENSIVE  -     CBC W/O DIFF  -     TSH 3RD GENERATION  -     DUPLEX LOWER EXT VENOUS LEFT; Future  -     DUPLEX LOWER EXT VENOUS RIGHT; Future  -     furosemide (LASIX) 40 mg tablet; Take 1 Tab by mouth daily for 14 days.     Plan was reviewed with patient and family, understanding expressed

## 2018-12-24 NOTE — PROGRESS NOTES
Nelson Ortiz is a 52 y.o. male    Chief Complaint   Patient presents with    Leg Pain     1. Have you been to the ER, urgent care clinic since your last visit? Hospitalized since your last visit? No     2. Have you seen or consulted any other health care providers outside of the [facility] since your last visit? Include any pap smears or colon screening.   No     Visit Vitals  BP (!) 148/102   Pulse 88   Temp 98.2 °F (36.8 °C) (Oral)   Resp 18   Ht 5' 10\" (1.778 m)   Wt 284 lb 3.2 oz (128.9 kg)   SpO2 96%   BMI 40.78 kg/m²

## 2018-12-24 NOTE — PATIENT INSTRUCTIONS
Leg and Ankle Edema: Care Instructions  Your Care Instructions  Swelling in the legs, ankles, and feet is called edema. It is common after you sit or stand for a while. Long plane flights or car rides often cause swelling in the legs and feet. You may also have swelling if you have to stand for long periods of time at your job. Problems with the veins in the legs (varicose veins) and changes in hormones can also cause swelling. Sometimes the swelling in the ankles and feet is caused by a more serious problem, such as heart failure, infection, blood clots, or liver or kidney disease. Follow-up care is a key part of your treatment and safety. Be sure to make and go to all appointments, and call your doctor if you are having problems. It's also a good idea to know your test results and keep a list of the medicines you take. How can you care for yourself at home? · If your doctor gave you medicine, take it as prescribed. Call your doctor if you think you are having a problem with your medicine. · Whenever you are resting, raise your legs up. Try to keep the swollen area higher than the level of your heart. · Take breaks from standing or sitting in one position. ? Walk around to increase the blood flow in your lower legs. ? Move your feet and ankles often while you stand, or tighten and relax your leg muscles. · Wear support stockings. Put them on in the morning, before swelling gets worse. · Eat a balanced diet. Lose weight if you need to. · Limit the amount of salt (sodium) in your diet. Salt holds fluid in the body and may increase swelling. When should you call for help? Call 911 anytime you think you may need emergency care. For example, call if:    · You have symptoms of a blood clot in your lung (called a pulmonary embolism). These may include:  ? Sudden chest pain. ? Trouble breathing. ?  Coughing up blood.    Call your doctor now or seek immediate medical care if:    · You have signs of a blood clot, such as:  ? Pain in your calf, back of the knee, thigh, or groin. ? Redness and swelling in your leg or groin.     · You have symptoms of infection, such as:  ? Increased pain, swelling, warmth, or redness. ? Red streaks or pus. ? A fever.    Watch closely for changes in your health, and be sure to contact your doctor if:    · Your swelling is getting worse.     · You have new or worsening pain in your legs.     · You do not get better as expected. Where can you learn more? Go to http://hazel-lucita.info/. Enter H873 in the search box to learn more about \"Leg and Ankle Edema: Care Instructions. \"  Current as of: November 20, 2017  Content Version: 11.8  © 0993-4588 [employer]. Care instructions adapted under license by [employer] (which disclaims liability or warranty for this information). If you have questions about a medical condition or this instruction, always ask your healthcare professional. [address] any warranty or liability for your use of this information.

## 2018-12-24 NOTE — TELEPHONE ENCOUNTER
On-call: Patient is calling stating he is having increasing fluid in his lower extremities over the past week. He is not having shortness of breath. .  No chest pain. He has not been checking his blood pressure. Counseled to avoid salt and elevate legs. Make an appointment to be seen in the office. If he feels his swelling is increasing significantly or if he is feeling shortness of breath or chest pain would recommend he be seen in emergency room or urgent care. Reviewed red flags.

## 2019-01-03 DIAGNOSIS — R60.0 BILATERAL LEG EDEMA: ICD-10-CM

## 2019-01-04 RX ORDER — FUROSEMIDE 40 MG/1
TABLET ORAL
Qty: 14 TAB | Refills: 0 | Status: SHIPPED | OUTPATIENT
Start: 2019-01-04 | End: 2019-01-20 | Stop reason: SDUPTHER

## 2019-01-04 NOTE — TELEPHONE ENCOUNTER
Spoke with pt - he continues with swelling in legs. States Lasix has helped with swelling. Pt has fuv with MD on 1/8/19. Sent in Rx.

## 2019-01-08 ENCOUNTER — OFFICE VISIT (OUTPATIENT)
Dept: INTERNAL MEDICINE CLINIC | Age: 54
End: 2019-01-08

## 2019-01-08 VITALS
WEIGHT: 287 LBS | BODY MASS INDEX: 41.09 KG/M2 | TEMPERATURE: 98 F | HEART RATE: 96 BPM | HEIGHT: 70 IN | DIASTOLIC BLOOD PRESSURE: 79 MMHG | RESPIRATION RATE: 18 BRPM | OXYGEN SATURATION: 94 % | SYSTOLIC BLOOD PRESSURE: 114 MMHG

## 2019-01-08 DIAGNOSIS — I87.303 STASIS EDEMA OF BOTH LOWER EXTREMITIES: Primary | ICD-10-CM

## 2019-01-08 DIAGNOSIS — L91.8 INFLAMED SKIN TAG: ICD-10-CM

## 2019-01-08 NOTE — PROGRESS NOTES
HISTORY OF PRESENT ILLNESS  Zeke Lugo is a 52 y.o. male. Leg Pain    The history is provided by the patient (furosemide helped a bit, swelling not resolved). This is a chronic problem. The problem has been gradually improving (slight). The pain is present in the right lower leg and left lower leg. The pain is moderate. Associated symptoms include limited range of motion and stiffness. Pertinent negatives include no numbness and no tingling. The symptoms are aggravated by activity and standing. Treatments tried: furosemide, TEDS- not on today. The treatment provided mild relief. There has been no history of extremity trauma. Diabetes   The history is provided by the patient (strongly encouraged lab work as we may need to stop pioglitazone). This is a chronic problem. Rash    The history is provided by the patient (r groin lesion). This is a chronic problem. The problem has been gradually worsening. The rash is present on the groin. The pain is moderate. He has tried nothing for the symptoms. Review of Systems   Musculoskeletal: Positive for stiffness. Skin: Positive for rash. Neurological: Negative for tingling and numbness. Physical Exam   Musculoskeletal: He exhibits edema. Mild bilateral lower extremity edema - no significant change   Neurological: He is alert. Skin: Skin is warm and dry. Psychiatric: He has a normal mood and affect. His behavior is normal.   Nursing note and vitals reviewed. ASSESSMENT and PLAN  Diagnoses and all orders for this visit:    1. Stasis edema of both lower extremities- Proceed with plan as discussed, change furosemide to prn only. Consider stopping actos    2. Inflamed skin tag  -     REFERRAL TO DERMATOLOGY    3.  Uncontrolled type 2 diabetes mellitus without complication, without long-term current use of insulin ([address]- see hpi

## 2019-01-09 ENCOUNTER — TELEPHONE (OUTPATIENT)
Dept: INTERNAL MEDICINE CLINIC | Age: 54
End: 2019-01-09

## 2019-01-09 NOTE — TELEPHONE ENCOUNTER
----- Message from Jana Matute MD sent at 1/9/2019 12:57 PM EST -----  Regarding: FW: Dermatology  Please call pt with this info  ----- Message -----  From: Alea Sarahi  Sent: 1/9/2019   8:39 AM  To: Jnaa Matute MD  Subject: Dermatology                                      Patient has [employer]. [employer] [facility] takes his insurance. The phone number is [phone].

## 2019-01-09 NOTE — TELEPHONE ENCOUNTER
Advised pt [facility] takes his insurance. Their phone number is [phone].

## 2019-01-11 DIAGNOSIS — E78.2 MIXED HYPERLIPIDEMIA: ICD-10-CM

## 2019-01-11 RX ORDER — ATORVASTATIN CALCIUM 40 MG/1
TABLET, FILM COATED ORAL
Qty: 90 TAB | Refills: 0 | Status: SHIPPED | OUTPATIENT
Start: 2019-01-11 | End: 2019-04-16 | Stop reason: SDUPTHER

## 2019-01-11 RX ORDER — PIOGLITAZONEHYDROCHLORIDE 30 MG/1
TABLET ORAL
Qty: 90 TAB | Refills: 1 | Status: SHIPPED | OUTPATIENT
Start: 2019-01-11 | End: 2020-06-16 | Stop reason: SINTOL

## 2019-01-11 RX ORDER — METFORMIN HYDROCHLORIDE 500 MG/1
TABLET, EXTENDED RELEASE ORAL
Qty: 360 TAB | Refills: 1 | Status: SHIPPED | OUTPATIENT
Start: 2019-01-11 | End: 2019-06-24 | Stop reason: SDUPTHER

## 2019-01-12 LAB
ALBUMIN SERPL-MCNC: 4.1 G/DL (ref 3.5–5.5)
ALBUMIN/GLOB SERPL: 1.5 {RATIO} (ref 1.2–2.2)
ALP SERPL-CCNC: 79 IU/L (ref 39–117)
ALT SERPL-CCNC: 26 IU/L (ref 0–44)
AST SERPL-CCNC: 17 IU/L (ref 0–40)
BILIRUB SERPL-MCNC: 0.3 MG/DL (ref 0–1.2)
BUN SERPL-MCNC: 13 MG/DL (ref 6–24)
BUN/CREAT SERPL: 14 (ref 9–20)
CALCIUM SERPL-MCNC: 8.8 MG/DL (ref 8.7–10.2)
CHLORIDE SERPL-SCNC: 105 MMOL/L (ref 96–106)
CO2 SERPL-SCNC: 24 MMOL/L (ref 20–29)
CREAT SERPL-MCNC: 0.91 MG/DL (ref 0.76–1.27)
ERYTHROCYTE [DISTWIDTH] IN BLOOD BY AUTOMATED COUNT: 14.4 % (ref 12.3–15.4)
EST. AVERAGE GLUCOSE BLD GHB EST-MCNC: 186 MG/DL
GLOBULIN SER CALC-MCNC: 2.7 G/DL (ref 1.5–4.5)
GLUCOSE SERPL-MCNC: 161 MG/DL (ref 65–99)
HBA1C MFR BLD: 8.1 % (ref 4.8–5.6)
HCT VFR BLD AUTO: 40.2 % (ref 37.5–51)
HGB BLD-MCNC: 13.2 G/DL (ref 13–17.7)
MCH RBC QN AUTO: 29.5 PG (ref 26.6–33)
MCHC RBC AUTO-ENTMCNC: 32.8 G/DL (ref 31.5–35.7)
MCV RBC AUTO: 90 FL (ref 79–97)
PLATELET # BLD AUTO: 357 X10E3/UL (ref 150–379)
POTASSIUM SERPL-SCNC: 4.7 MMOL/L (ref 3.5–5.2)
PROT SERPL-MCNC: 6.8 G/DL (ref 6–8.5)
RBC # BLD AUTO: 4.48 X10E6/UL (ref 4.14–5.8)
SODIUM SERPL-SCNC: 141 MMOL/L (ref 134–144)
TSH SERPL DL<=0.005 MIU/L-ACNC: 1.58 UIU/ML (ref 0.45–4.5)
WBC # BLD AUTO: 6.3 X10E3/UL (ref 3.4–10.8)

## 2019-01-20 ENCOUNTER — TELEPHONE (OUTPATIENT)
Dept: INTERNAL MEDICINE CLINIC | Age: 54
End: 2019-01-20

## 2019-01-20 DIAGNOSIS — R60.0 BILATERAL LEG EDEMA: ICD-10-CM

## 2019-01-20 NOTE — TELEPHONE ENCOUNTER
Reviewed lab -  - dm better but not optimal. He declines injectable therapy , possibly unable to afford Jardiance. He'd prefer to work on Diet and exercise and sound motivated.  Will gianfranco labs at follow up

## 2019-01-21 RX ORDER — FUROSEMIDE 40 MG/1
TABLET ORAL
Qty: 14 TAB | Refills: 0 | Status: SHIPPED | OUTPATIENT
Start: 2019-01-21 | End: 2020-01-06

## 2019-01-24 ENCOUNTER — OFFICE VISIT (OUTPATIENT)
Dept: DERMATOLOGY | Facility: AMBULATORY SURGERY CENTER | Age: 54
End: 2019-01-24

## 2019-01-24 ENCOUNTER — HOSPITAL ENCOUNTER (OUTPATIENT)
Dept: LAB | Age: 54
Discharge: HOME | End: 2019-01-24

## 2019-01-24 VITALS
HEART RATE: 84 BPM | OXYGEN SATURATION: 94 % | SYSTOLIC BLOOD PRESSURE: 130 MMHG | WEIGHT: 274 LBS | DIASTOLIC BLOOD PRESSURE: 80 MMHG | HEIGHT: 70 IN | TEMPERATURE: 99.1 F | BODY MASS INDEX: 39.22 KG/M2 | RESPIRATION RATE: 16 BRPM

## 2019-01-24 DIAGNOSIS — D48.5 NEOPLASM OF UNCERTAIN BEHAVIOR OF SKIN OF THIGH: Primary | ICD-10-CM

## 2019-01-24 NOTE — PROGRESS NOTES
Written by Fareed Lyons, as dictated by Krissy Reyes, [address]. Name: Dwaine Cheatham       Age: 52 y.o. Date: 1/24/2019    Chief Complaint:   Chief Complaint   Patient presents with    Skin Problem     Skin tag removal groain area        Subjective:    HPI:  Mr.. Dwaine Cheatham is a 52 y.o. male who presents for the evaluation of a lesion on the right proximal thigh area. He states that the lesion appeared 2 years ago. The patient has not had prior treatment for this lesion. Associated symptoms include persistent, growing, and bothersome lesion that rubs against his undergarments.     ROS: Consitutional: Negative  Dermatological : positive for - skin lesion changes    Social History     Socioeconomic History    Marital status:      Spouse name: Not on file    Number of children: Not on file    Years of education: Not on file    Highest education level: Not on file   Social Needs    Financial resource strain: Not on file    Food insecurity - worry: Not on file    Food insecurity - inability: Not on file    Transportation needs - medical: Not on file   [place] needs - non-medical: Not on file   Occupational History    Not on file   Tobacco Use    Smoking status: Never Smoker    Smokeless tobacco: Never Used   Substance and Sexual Activity    Alcohol use: No     Comment: RARE    Drug use: No    Sexual activity: Yes     Partners: Female   Other Topics Concern    Not on file   Social History Narrative    Not on file       Family History   Problem Relation Age of Onset    Diabetes Mother     Hypertension Mother     Other Mother         LUPUS AND [address]    Stroke Father 68        ich       Past Medical History:   Diagnosis Date    Arrhythmia     Asthma     Chronic pain     NECK AND KNEES    Diabetes ([address]     Diet controlled    Seizures ([address] 2007    One time event    Unspecified sleep apnea        Past Surgical History:   Procedure Laterality Date    ABDOMEN SURGERY PROC UNLISTED      splenectomy- post traumatic    HX ORTHOPAEDIC      cervical spine- disc, shrapnel r forearm, orif bilateral as child    HX ORTHOPAEDIC      RIGHT HAND SURGERY     HX ORTHOPAEDIC      r knee arthro 2012       Current Outpatient Medications   Medication Sig Dispense Refill    metFORMIN ER (GLUCOPHAGE XR) 500 mg tablet TAKE 2 TABLETS EVERY DAY WITH BREAKFAST AND TAKE 2 TABLETS WITH  DINNER 360 Tab 1    atorvastatin (LIPITOR) 40 mg tablet TAKE 1 TABLET EVERY DAY (NEED MD APPOINTMENT) 90 Tab 0    glimepiride (AMARYL) 4 mg tablet TAKE 1 TABLET BY MOUTH EVERY DAY IN THE MORNING, 1 TABLET WITH DINNER- new directions 180 Tab 1    losartan-hydroCHLOROthiazide (HYZAAR) 50-12.5 mg per tablet Take 1 Tab by mouth daily. Replaces triamterene 90 Tab 3    Blood Glucose Control, Normal (ACCU-CHEK SMARTVIEW CONTRL SOL) soln Check blood sugars three times daily. Dx E11.65. 2.5 mL 1    glucose blood VI test strips (ACCU-CHEK SMARTVIEW TEST STRIP) strip Check blood sugars three times daily. Dx E11.65. 100 Strip 11    alcohol swabs (BD SINGLE USE SWABS REGULAR) padm Check blood sugars three times daily. Dx E11.65. 100 Pad 11    Blood-Glucose Meter (ACCU-CHEK BRONSON PLUS METER) misc Check blood sugars three times daily. Dx E11.65. 1 Each 0    Lancets (ACCU-CHEK SOFTCLIX LANCETS) misc Check blood sugars three times daily. Dx E11.65. 300 Each 3    glucose blood VI test strips (ACCU-CHEK BRONSON PLUS TEST STRP) strip Check blood sugars three times daily. Dx E11.65. 300 Strip 3    PROAIR HFA 90 mcg/actuation inhaler USE 2-4 PUFFS BY MOUTH EVERY 4-6 HOURS FOR 48 HOURS THEN AS NEEDED 1 Inhaler 3    cetirizine (ZYRTEC) 10 mg tablet TAKE 1 TABLET BY MOUTH DAILY.  30 Tab 0    furosemide (LASIX) 40 mg tablet TAKE ONE TABLET BY MOUTH DAILY FOR 14 DAYS 14 Tab 0    pioglitazone (ACTOS) 30 mg tablet TAKE 1 TABLET EVERY DAY 90 Tab 1    fluticasone (FLONASE) 50 mcg/actuation nasal spray 2 Sprays by Both Nostrils route daily. 1 Bottle 11    aspirin 81 mg chewable tablet Take 1 Tab by mouth daily. 30 Tab 0       Allergies   Allergen Reactions    Egg Seizures         Objective:    Visit Vitals  /80 (BP 1 Location: Right arm, BP Patient Position: Sitting)   Pulse 84   Temp 99.1 °F (37.3 °C) (Oral)   Resp 16   Ht 5' 10\" (1.778 m)   Wt 274 lb (124.3 kg)   SpO2 94%   BMI 39.31 kg/m²       Karan Gibbs is a 52 y.o. male who appears well and in no distress. He is awake, alert, and oriented. A limited skin examination was completed including the right proximal thigh. There is a 6 mm skin-toned papule with red/inflamed appearing tip on the right proximal thigh, most consistent with skin tag. Assessment/Plan:    1. Neoplasm of Uncertain Behavior, right proximal thigh, favor skin tag. The differential diagnoses were discussed. A shave removal was advised to address this lesion. The procedure was reviewed and verbal and written consent were obtained. The risks of pain, bleeding, infection, recurrence and scar were discussed. I performed the procedure. The site was cleansed and anesthetized with 1% Lidocaine with Epinephrine 1:100,000. A shave removal was performed to remove the lesion in its clinical entirety. Drysol was used for hemostasis. The wound was bandaged and care reviewed. The specimen was sent to pathology. I will contact the patient with the results and any further treatment that may be necessary. This plan was reviewed with the patient and patient agrees. All questions were answered. This scribe documentation was reviewed by me and accurately reflects the examination and decisions made by me. [facility]   OFFICE PROCEDURE PROGRESS NOTE   Chart reviewed for the following:   I, Wei Dawn, have reviewed the History, Physical and updated the Allergic reactions for Karan Gibbs.     TIME OUT performed immediately prior to start of procedure:   Wei CHAVIRA, have performed the following reviews on Kandi Fairbanks   prior to the start of the procedure:     * Patient was identified by name and date of birth   * Agreement on procedure being performed was verified   * Risks and Benefits explained to the patient   * Procedure site verified and marked as necessary   * Patient was positioned for comfort   * Consent was signed and verified     Time: 8:40 AM  Date of procedure: 1/24/2019  Procedure performed by: Adam Mckeon  Provider assisted by: Kaila Bansal LPN   Patient assisted by: self   How tolerated by patient: tolerated the procedure well with no complications   Comments: none

## 2019-01-24 NOTE — PROGRESS NOTES
Chief Complaint   Patient presents with    Skin Problem     Skin tag removal groain area      1. Have you been to the ER, urgent care clinic since your last visit? Hospitalized since your last visit? No    2. Have you seen or consulted any other health care providers outside of the [address] [place] [address] since your last visit? Include any pap smears or colon screening.  No

## 2019-03-08 ENCOUNTER — OFFICE VISIT (OUTPATIENT)
Dept: INTERNAL MEDICINE CLINIC | Age: 54
End: 2019-03-08

## 2019-03-08 DIAGNOSIS — Z23 ENCOUNTER FOR IMMUNIZATION: ICD-10-CM

## 2019-03-08 NOTE — PATIENT INSTRUCTIONS
Vaccine Information Statement    Pneumococcal Polysaccharide Vaccine: What You Need to Know    Many Vaccine Information Statements are available in Nepali and other languages. See www.immunize.org/vis. Hojas de información Sobre Vacunas están disponibles en español y en muchos otros idiomas. Visite Tanner.si. 1. Why get vaccinated? Vaccination can protect older adults (and some children and younger adults) from pneumococcal disease. Pneumococcal disease is caused by bacteria that can spread from person to person through close contact. It can cause ear infections, and it can also lead to more serious infections of the:   Lungs (pneumonia),   Blood (bacteremia), and   Covering of the brain and spinal cord (meningitis). Meningitis can cause deafness and brain damage, and it can be fatal.      Anyone can get pneumococcal disease, but children under 3years of age, people with certain medical conditions, adults over 72years of age, and cigarette smokers are at the highest risk. About 18,000 older adults die each year from pneumococcal disease in the [place]. Treatment of pneumococcal infections with penicillin and other drugs used to be more effective. But some strains of the disease have become resistant to these drugs. This makes prevention of the disease, through vaccination, even more important. 2. Pneumococcal polysaccharide vaccine (PPSV23)    Pneumococcal polysaccharide vaccine (PPSV23) protects against 23 types of pneumococcal bacteria. It will not prevent all pneumococcal disease. PPSV23 is recommended for:   All adults 72years of age and older,   Anyone 2 through 59years of age with certain long-term health problems,   Anyone 2 through 59years of age with a weakened immune system,   Adults 23 through 59years of age who smoke cigarettes or have asthma. Most people need only one dose of PPSV.   A second dose is recommended for certain high-risk groups. People 72 and older should get a dose even if they have gotten one or more doses of the vaccine before they turned 65. Your healthcare provider can give you more information about these recommendations. Most healthy adults develop protection within 2 to 3 weeks of getting the shot. 3. Some people should not get this vaccine     Anyone who has had a life-threatening allergic reaction to PPSV should not get another dose.  Anyone who has a severe allergy to any component of PPSV should not receive it. Tell your provider if you have any severe allergies.  Anyone who is moderately or severely ill when the shot is scheduled may be asked to wait until they recover before getting the vaccine. Someone with a mild illness can usually be vaccinated.  Children less than 3years of age should not receive this vaccine.  There is no evidence that PPSV is harmful to either a pregnant woman or to her fetus. However, as a precaution, women who need the vaccine should be vaccinated before becoming pregnant, if possible. 4. Risks of a vaccine reaction    With any medicine, including vaccines, there is a chance of side effects. These are usually mild and go away on their own, but serious reactions are also possible. About half of people who get PPSV have mild side effects, such as redness or pain where the shot is given, which go away within about two days. Less than 1 out of 100 people develop a fever, muscle aches, or more severe local reactions. Problems that could happen after any vaccine:     People sometimes faint after a medical procedure, including vaccination. Sitting or lying down for about 15 minutes can help prevent fainting, and injuries caused by a fall. Tell your doctor if you feel dizzy, or have vision changes or ringing in the ears.  Some people get severe pain in the shoulder and have difficulty moving the arm where a shot was given.  This happens very rarely.  Any medication can cause a severe allergic reaction. Such reactions from a vaccine are very rare, estimated at about 1 in a million doses, and would happen within a few minutes to a few hours after the vaccination. As with any medicine, there is a very remote chance of a vaccine causing a serious injury or death. The safety of vaccines is always being monitored. For more information, visit: www.cdc.gov/vaccinesafety/     5. What if there is a serious reaction? What should I look for? Look for anything that concerns you, such as signs of a severe allergic reaction, very high fever, or unusual behavior. Signs of a severe allergic reaction can include hives, swelling of the face and throat, difficulty breathing, a fast heartbeat, dizziness, and weakness. These would usually start a few minutes to a few hours after the vaccination. What should I do? If you think it is a severe allergic reaction or other emergency that cant wait, call 9-1-1 or get to the nearest hospital. Otherwise, call your doctor. Afterward, the reaction should be reported to the Vaccine Adverse Event Reporting System (VAERS). Your doctor might file this report, or you can do it yourself through the VAERS web site at www.vaers. [facility].gov, or by calling 7-511.926.8139. [facility] does not give medical advice. 6. How can I learn more?  Ask your doctor. He or she can give you the vaccine package insert or suggest other sources of information.  Call your local or state health department.    Contact the Centers for Disease Control and Prevention (CDC):  - Call 1-805.956.9704 (1-800-CDC-INFO) or  - Visit CDCs website at TigerText 18 04/24/2015    Formerly [facility] and [facility] for Disease Control and Prevention    Office Use Only

## 2019-03-11 ENCOUNTER — OFFICE VISIT (OUTPATIENT)
Dept: INTERNAL MEDICINE CLINIC | Age: 54
End: 2019-03-11

## 2019-03-11 VITALS
RESPIRATION RATE: 18 BRPM | SYSTOLIC BLOOD PRESSURE: 135 MMHG | TEMPERATURE: 98 F | BODY MASS INDEX: 41.01 KG/M2 | HEART RATE: 94 BPM | DIASTOLIC BLOOD PRESSURE: 89 MMHG | WEIGHT: 286.5 LBS | HEIGHT: 70 IN | OXYGEN SATURATION: 96 %

## 2019-03-11 DIAGNOSIS — E66.01 OBESITY, MORBID (HCC): ICD-10-CM

## 2019-03-11 DIAGNOSIS — Z12.5 SCREENING FOR PROSTATE CANCER: ICD-10-CM

## 2019-03-11 DIAGNOSIS — I10 ESSENTIAL HYPERTENSION, BENIGN: ICD-10-CM

## 2019-03-11 DIAGNOSIS — Z23 ENCOUNTER FOR IMMUNIZATION: ICD-10-CM

## 2019-03-11 DIAGNOSIS — E78.2 MIXED HYPERLIPIDEMIA: ICD-10-CM

## 2019-03-11 DIAGNOSIS — Z00.00 INITIAL MEDICARE ANNUAL WELLNESS VISIT: Primary | ICD-10-CM

## 2019-03-11 RX ORDER — PHENTERMINE HYDROCHLORIDE 37.5 MG/1
37.5 TABLET ORAL
Qty: 30 TAB | Refills: 1 | Status: SHIPPED | OUTPATIENT
Start: 2019-03-11 | End: 2020-01-06 | Stop reason: SDUPTHER

## 2019-03-11 NOTE — PATIENT INSTRUCTIONS
Medicare Wellness Visit, Male    The best way to live healthy is to have a lifestyle where you eat a well-balanced diet, exercise regularly, limit alcohol use, and quit all forms of tobacco/nicotine, if applicable. Regular preventive services are another way to keep healthy. Preventive services (vaccines, screening tests, monitoring & exams) can help personalize your care plan, which helps you manage your own care. Screening tests can find health problems at the earliest stages, when they are easiest to treat. [facility] follows the current, evidence-based guidelines published by the [facility] ([facility]) when recommending preventive services for our patients. Because we follow these guidelines, sometimes recommendations change over time as research supports it. (For example, a prostate screening blood test is no longer routinely recommended for men with no symptoms.)  Of course, you and your doctor may decide to screen more often for some diseases, based on your risk and co-morbidities (chronic disease you are already diagnosed with). Preventive services for you include:  - Medicare offers their members a free annual wellness visit, which is time for you and your primary care provider to discuss and plan for your preventive service needs. Take advantage of this benefit every year!  -All adults over age 72 should receive the recommended pneumonia vaccines. Current USPSTF guidelines recommend a series of two vaccines for the best pneumonia protection.   -All adults should have a flu vaccine yearly and an ECG.  All adults age 61 and older should receive a shingles vaccine once in their lifetime.    -All adults age 38-68 who are overweight should have a diabetes screening test once every three years.   -Other screening tests & preventive services for persons with diabetes include: an eye exam to screen for diabetic retinopathy, a kidney function test, a foot exam, and stricter control over your cholesterol.   -Cardiovascular screening for adults with routine risk involves an electrocardiogram (ECG) at intervals determined by the provider.   -Colorectal cancer screening should be done for adults age 54-65 with no increased risk factors for colorectal cancer. There are a number of acceptable methods of screening for this type of cancer. Each test has its own benefits and drawbacks. Discuss with your provider what is most appropriate for you during your annual wellness visit. The different tests include: colonoscopy (considered the best screening method), a fecal occult blood test, a fecal DNA test, and sigmoidoscopy.  -All adults born between [facility] should be screened once for Hepatitis C.  -An Abdominal Aortic Aneurysm (AAA) Screening is recommended for men age 73-68 who has ever smoked in their lifetime.      Here is a list of your current Health Maintenance items (your personalized list of preventive services) with a due date:  Health Maintenance Due   Topic Date Due    Shingles Vaccine (1 of 2) 10/24/2013    Eye Exam  07/15/2016    Albumin Urine Test  12/07/2018

## 2019-03-11 NOTE — PROGRESS NOTES
HISTORY OF PRESENT ILLNESS  Zeke Lugo is a 52 y.o. male. DAVID Hernandez is seen today for a Medicare wellness visit and follow up of chronic problems. 1. Medicare wellness visit. See attached note. He is due for labs, shingles vaccine and eye exam.  Also see assessment and plan and health maintenance record. 2. Chronic problems are reviewed. Knee pain, severe and intractable. Orthopedic consultation is pending and likely will require replacements. 3. Blood pressure is fine. Cholesterol and diabetes measurements are due. Reviewed his med list and he is not taking aspirin at this point, but I advised him to restart this given his risk factors and diabetes. Review of Systems:  Notable for some constipation. Weight goes up and down. I talked to him about initiation of Phentermine and he agrees. Reviewed potential side effects and goals of treatment. Review of Systems   Constitutional: Negative for weight loss. HENT: Positive for hearing loss. Respiratory: Negative. Cardiovascular: Negative for chest pain, palpitations, leg swelling and PND. Gastrointestinal: Negative. Genitourinary: Negative. Musculoskeletal: Positive for joint pain. Negative for myalgias. Neurological: Negative for focal weakness. Physical Exam   Constitutional: No distress. HENT:   Right Ear: Tympanic membrane and ear canal normal.   Left Ear: Tympanic membrane and ear canal normal.   Nose: Right sinus exhibits no maxillary sinus tenderness and no frontal sinus tenderness. Left sinus exhibits no maxillary sinus tenderness and no frontal sinus tenderness. Mouth/Throat: No oropharyngeal exudate, posterior oropharyngeal edema or posterior oropharyngeal erythema. Eyes: Pupils are equal, round, and reactive to light. Right eye exhibits no discharge. Left eye exhibits no discharge. Neck: Neck supple. Carotid bruit is not present. Cardiovascular: Normal rate and regular rhythm.  Exam reveals no gallop and no friction rub. No murmur heard. Pulmonary/Chest: Effort normal and breath sounds normal. No respiratory distress. Musculoskeletal: He exhibits edema. Trace stasis edema bilateral lower extremities. Lymphadenopathy:     He has no cervical adenopathy. Neurological: He is alert. Skin: Skin is warm and dry. Psychiatric: He has a normal mood and affect. His behavior is normal.   Nursing note and vitals reviewed. ASSESSMENT and PLAN  Diagnoses and all orders for this visit:    1. Initial Medicare annual wellness visit    2. Uncontrolled type 2 diabetes mellitus without complication, without long-term current use of insulin (HCC)  -     REFERRAL TO OPHTHALMOLOGY  -     METABOLIC PANEL, COMPREHENSIVE  -     HEMOGLOBIN A1C WITH EAG  -     URINALYSIS W/ RFLX MICROSCOPIC  -     MICROALBUMIN, UR, RAND W/ MICROALB/CREAT RATIO    3. Mixed hyperlipidemia  -     CBC WITH AUTOMATED DIFF  -     LIPID PANEL  -     TSH 3RD GENERATION    4. Essential hypertension, benign- Continue current regimen of prescription and / or OTC medications     5. Obesity, morbid (HCC)  -     phentermine (ADIPEX-P) 37.5 mg tablet; Take 1 Tab by mouth every morning. Max Daily Amount: 37.5 mg.    6. Encounter for immunization    7.  Screening for prostate cancer  -     PSA SCREENING (SCREENING)    Other orders  -     varicella-zoster recombinant, PF, (SHINGRIX, PF,) 50 mcg/0.5 mL susr injection; 0.5mL by IntraMUSCular route once now and then repeat in 2-6 months

## 2019-03-11 NOTE — PROGRESS NOTES
This is an Initial Medicare Annual Wellness Exam (AWV) (Performed 12 months after IPPE or effective date of Medicare Part B enrollment, Once in a lifetime)    I have reviewed the patient's medical history in detail and updated the computerized patient record. History     Past Medical History:   Diagnosis Date    Arrhythmia     Asthma     Chronic pain     NECK AND KNEES    Diabetes ([facility] [address]     Diet controlled    Seizures ([facility] [address] 2007    One time event    Unspecified sleep apnea       Past Surgical History:   Procedure Laterality Date    ABDOMEN SURGERY PROC UNLISTED      splenectomy- post traumatic    HX ORTHOPAEDIC      cervical spine- disc, shrapnel r forearm, orif bilateral as child    HX ORTHOPAEDIC      RIGHT HAND SURGERY     HX ORTHOPAEDIC      r knee arthro 2012     Current Outpatient Medications   Medication Sig Dispense Refill    omega-3s-dha-epa-fish oil (SEA-OMEGA) 200 mg-300 mg- 100 mg-1,000 mg cap capsule TAKE 2 CAPSULES BY MOUTH TWICE A DAY      varicella-zoster recombinant, PF, (SHINGRIX, PF,) 50 mcg/0.5 mL susr injection 0.5mL by IntraMUSCular route once now and then repeat in 2-6 months 0.5 mL 1    furosemide (LASIX) 40 mg tablet TAKE ONE TABLET BY MOUTH DAILY FOR 14 DAYS 14 Tab 0    metFORMIN ER (GLUCOPHAGE XR) 500 mg tablet TAKE 2 TABLETS EVERY DAY WITH BREAKFAST AND TAKE 2 TABLETS WITH  DINNER 360 Tab 1    pioglitazone (ACTOS) 30 mg tablet TAKE 1 TABLET EVERY DAY 90 Tab 1    atorvastatin (LIPITOR) 40 mg tablet TAKE 1 TABLET EVERY DAY (NEED MD APPOINTMENT) 90 Tab 0    glimepiride (AMARYL) 4 mg tablet TAKE 1 TABLET BY MOUTH EVERY DAY IN THE MORNING, 1 TABLET WITH DINNER- new directions 180 Tab 1    losartan-hydroCHLOROthiazide (HYZAAR) 50-12.5 mg per tablet Take 1 Tab by mouth daily. Replaces triamterene 90 Tab 3    Blood Glucose Control, Normal (ACCU-CHEK SMARTVIEW CONTRL SOL) soln Check blood sugars three times daily.  Dx E11.65. 2.5 mL 1    glucose blood VI test strips (ACCU-CHEK SMARTVIEW TEST STRIP) strip Check blood sugars three times daily. Dx E11.65. 100 Strip 11    alcohol swabs (BD SINGLE USE SWABS REGULAR) padm Check blood sugars three times daily. Dx E11.65. 100 Pad 11    Blood-Glucose Meter (ACCU-CHEK BRONSON PLUS METER) misc Check blood sugars three times daily. Dx E11.65. 1 Each 0    Lancets (ACCU-CHEK SOFTCLIX LANCETS) misc Check blood sugars three times daily. Dx E11.65. 300 Each 3    glucose blood VI test strips (ACCU-CHEK BRONSON PLUS TEST STRP) strip Check blood sugars three times daily. Dx E11.65. 300 Strip 3    PROAIR HFA 90 mcg/actuation inhaler USE 2-4 PUFFS BY MOUTH EVERY 4-6 HOURS FOR 48 HOURS THEN AS NEEDED 1 Inhaler 3    cetirizine (ZYRTEC) 10 mg tablet TAKE 1 TABLET BY MOUTH DAILY. 30 Tab 0    fluticasone (FLONASE) 50 mcg/actuation nasal spray 2 Sprays by Both Nostrils route daily. 1 Bottle 11    aspirin 81 mg chewable tablet Take 1 Tab by mouth daily.  27 Tab 0     Allergies   Allergen Reactions    Egg Seizures     Family History   Problem Relation Age of Onset    Diabetes Mother     Hypertension Mother     Other Mother         LUPUS AND Cleotis Hals    Stroke Father 68        ich     Social History     Tobacco Use    Smoking status: Never Smoker    Smokeless tobacco: Never Used   Substance Use Topics    Alcohol use: No     Comment: RARE     Patient Active Problem List   Diagnosis Code    Intrinsic asthma J45.909    Onychomycosis B35.1    Unspecified vitamin D deficiency E55.9    Cervical disc disorder M50.90    Osteoarthrosis, unspecified whether generalized or localized, lower leg M17.10    Sleep apnea G47.30    Encounter for long-term (current) use of other medications Z79.899    Stasis edema of right lower extremity I87.301    Essential hypertension, benign I10    Mixed hyperlipidemia E78.2    MMT (medial meniscus tear) S83.249A    Controlled type 2 diabetes mellitus without complication ([facility] [address] L66.5    Generalized osteoarthritis M15.9    Post-traumatic osteoarthritis of both knees M17.2    Primary osteoarthritis of both hands M19.041, M19.042    Long term current use of non-steroidal anti-inflammatories (NSAID) Z79.1    Obesity, morbid (HCC) E66.01       Depression Risk Factor Screening:     3 most recent PHQ Screens 3/11/2019   Little interest or pleasure in doing things Not at all   Feeling down, depressed, irritable, or hopeless Not at all   Total Score PHQ 2 0   Trouble falling or staying asleep, or sleeping too much -   Feeling tired or having little energy -   Poor appetite, weight loss, or overeating -   Feeling bad about yourself - or that you are a failure or have let yourself or your family down -   Trouble concentrating on things such as school, work, reading, or watching TV -   Moving or speaking so slowly that other people could have noticed; or the opposite being so fidgety that others notice -   Thoughts of being better off dead, or hurting yourself in some way -   PHQ 9 Score -   How difficult have these problems made it for you to do your work, take care of your home and get along with others -     Alcohol Risk Factor Screening: You do not drink alcohol or very rarely. Functional Ability and Level of Safety:     Hearing Loss  The patient wears hearing aids. Activities of Daily Living  The home contains: no safety equipment. Patient does total self care    Fall Risk  Fall Risk Assessment, last 12 mths 12/24/2018   Able to walk? Yes   Fall in past 12 months? No       Abuse Screen  Patient is not abused    Cognitive Screening   Evaluation of Cognitive Function:  Has your family/caregiver stated any concerns about your memory: no  Normal    Patient Care Team   Patient Care Team:  Mauricio Metzger MD as PCP - General (Internal Medicine)    Assessment/Plan   Education and counseling provided:  Are appropriate based on today's review and evaluation    Diagnoses and all orders for this visit:    1. Uncontrolled type 2 diabetes mellitus without complication, without long-term current use of insulin ([address]  -     REFERRAL TO OPHTHALMOLOGY    2. Mixed hyperlipidemia    3. Essential hypertension, benign    4. Obesity, morbid ([address]    5.  Encounter for immunization    Other orders  -     varicella-zoster recombinant, PF, (SHINGRIX, PF,) 50 mcg/0.5 mL susr injection; 0.5mL by IntraMUSCular route once now and then repeat in 2-6 months         Health Maintenance Due   Topic Date Due    Shingrix Vaccine Age 50> (1 of 2) 10/24/2013    EYE EXAM RETINAL OR DILATED  07/15/2016    MICROALBUMIN Q1  12/07/2018

## 2019-04-16 DIAGNOSIS — E78.2 MIXED HYPERLIPIDEMIA: ICD-10-CM

## 2019-04-17 RX ORDER — ATORVASTATIN CALCIUM 40 MG/1
TABLET, FILM COATED ORAL
Qty: 90 TAB | Refills: 0 | Status: SHIPPED | OUTPATIENT
Start: 2019-04-17 | End: 2019-06-19 | Stop reason: SDUPTHER

## 2019-05-29 ENCOUNTER — OFFICE VISIT (OUTPATIENT)
Dept: INTERNAL MEDICINE CLINIC | Age: 54
End: 2019-05-29

## 2019-05-29 VITALS
WEIGHT: 264 LBS | SYSTOLIC BLOOD PRESSURE: 150 MMHG | HEART RATE: 103 BPM | TEMPERATURE: 98.1 F | HEIGHT: 70 IN | DIASTOLIC BLOOD PRESSURE: 98 MMHG | BODY MASS INDEX: 37.8 KG/M2 | OXYGEN SATURATION: 93 % | RESPIRATION RATE: 18 BRPM

## 2019-05-29 DIAGNOSIS — M54.50 ACUTE LEFT-SIDED LOW BACK PAIN WITHOUT SCIATICA: ICD-10-CM

## 2019-05-29 DIAGNOSIS — G47.33 OSA (OBSTRUCTIVE SLEEP APNEA): Primary | ICD-10-CM
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
## 2019-05-29 DIAGNOSIS — E66.01 MORBID OBESITY (HCC): ICD-10-CM
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
## 2019-06-06 ENCOUNTER — OFFICE VISIT (OUTPATIENT)
Dept: INTERNAL MEDICINE CLINIC | Age: 54
End: 2019-06-06

## 2019-06-06 VITALS
SYSTOLIC BLOOD PRESSURE: 124 MMHG | HEIGHT: 70 IN | DIASTOLIC BLOOD PRESSURE: 92 MMHG | TEMPERATURE: 98 F | OXYGEN SATURATION: 95 % | WEIGHT: 265.4 LBS | RESPIRATION RATE: 18 BRPM | HEART RATE: 106 BPM | BODY MASS INDEX: 37.99 KG/M2
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
## 2019-06-06 DIAGNOSIS — M54.50 ACUTE LEFT-SIDED LOW BACK PAIN WITHOUT SCIATICA: Primary | ICD-10-CM
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
## 2019-06-18 ENCOUNTER — TELEPHONE (OUTPATIENT)
Dept: INTERNAL MEDICINE CLINIC | Age: 54
End: 2019-06-18

## 2019-06-18 DIAGNOSIS — M54.50 ACUTE LEFT-SIDED LOW BACK PAIN WITHOUT SCIATICA: Primary | ICD-10-CM

## 2019-06-18 NOTE — TELEPHONE ENCOUNTER
[phone]    The patient would like something called in for his back pain .  He is still having a bad back pain

## 2019-06-18 NOTE — TELEPHONE ENCOUNTER
Left detailed message to return the call and speak with a nurse, need current symptoms, what has he tried for pain, is he going to PT?

## 2019-06-18 NOTE — TELEPHONE ENCOUNTER
Pt. States he has persistent lower left back pain, was 10 yesterday and couldn't walk, today is a 6 after going to Physical Therapy, is out of robaxin, will take aleve tonight. Referred to Dr Kenneth Casas [phone] per Dr Kaylan Tapia. Pt. Has seen him in the past and will schedule himself.

## 2019-06-19 ENCOUNTER — TELEPHONE (OUTPATIENT)
Dept: INTERNAL MEDICINE CLINIC | Age: 54
End: 2019-06-19

## 2019-06-19 DIAGNOSIS — E78.2 MIXED HYPERLIPIDEMIA: ICD-10-CM

## 2019-06-19 RX ORDER — METHOCARBAMOL 500 MG/1
500 TABLET, FILM COATED ORAL
Qty: 30 TAB | Refills: 0 | Status: SHIPPED | OUTPATIENT
Start: 2019-06-19 | End: 2020-06-16 | Stop reason: ALTCHOICE

## 2019-06-20 RX ORDER — ATORVASTATIN CALCIUM 40 MG/1
TABLET, FILM COATED ORAL
Qty: 90 TAB | Refills: 0 | Status: SHIPPED | OUTPATIENT
Start: 2019-06-20 | End: 2019-07-12 | Stop reason: SDUPTHER

## 2019-06-25 RX ORDER — METFORMIN HYDROCHLORIDE 500 MG/1
TABLET, EXTENDED RELEASE ORAL
Qty: 360 TAB | Refills: 1 | Status: SHIPPED | OUTPATIENT
Start: 2019-06-25 | End: 2020-02-12 | Stop reason: SDUPTHER

## 2019-07-12 DIAGNOSIS — E78.2 MIXED HYPERLIPIDEMIA: ICD-10-CM

## 2019-07-12 DIAGNOSIS — I10 ESSENTIAL HYPERTENSION, BENIGN: ICD-10-CM

## 2019-07-12 DIAGNOSIS — E11.9 CONTROLLED TYPE 2 DIABETES MELLITUS WITHOUT COMPLICATION, UNSPECIFIED WHETHER LONG TERM INSULIN USE (HCC): ICD-10-CM

## 2019-07-12 RX ORDER — BLOOD SUGAR DIAGNOSTIC
STRIP MISCELLANEOUS
Qty: 300 STRIP | Refills: 1 | Status: SHIPPED | OUTPATIENT
Start: 2019-07-12 | End: 2020-07-30 | Stop reason: SDUPTHER

## 2019-07-12 RX ORDER — GLIMEPIRIDE 4 MG/1
TABLET ORAL
Qty: 180 TAB | Refills: 1 | Status: SHIPPED | OUTPATIENT
Start: 2019-07-12 | End: 2020-01-06

## 2019-07-12 RX ORDER — LANCETS
EACH MISCELLANEOUS
Qty: 300 EACH | Refills: 3 | Status: SHIPPED | OUTPATIENT
Start: 2019-07-12

## 2019-07-12 RX ORDER — BLOOD-GLUCOSE METER
EACH MISCELLANEOUS
Qty: 1 EACH | Refills: 0 | Status: SHIPPED | OUTPATIENT
Start: 2019-07-12

## 2019-07-12 RX ORDER — ATORVASTATIN CALCIUM 40 MG/1
TABLET, FILM COATED ORAL
Qty: 90 TAB | Refills: 0 | Status: SHIPPED | OUTPATIENT
Start: 2019-07-12 | End: 2019-10-24 | Stop reason: SDUPTHER

## 2019-07-12 RX ORDER — INSULIN PUMP SYRINGE, 3 ML
EACH MISCELLANEOUS
Qty: 1 ML | Refills: 1 | Status: SHIPPED | OUTPATIENT
Start: 2019-07-12

## 2019-07-12 RX ORDER — BLOOD SUGAR DIAGNOSTIC
STRIP MISCELLANEOUS
Qty: 300 STRIP | Refills: 3 | Status: SHIPPED | OUTPATIENT
Start: 2019-07-12 | End: 2020-07-30 | Stop reason: SDUPTHER

## 2019-07-12 RX ORDER — LOSARTAN POTASSIUM AND HYDROCHLOROTHIAZIDE 12.5; 5 MG/1; MG/1
TABLET ORAL
Qty: 90 TAB | Refills: 3 | Status: SHIPPED | OUTPATIENT
Start: 2019-07-12 | End: 2020-06-09 | Stop reason: SDUPTHER

## 2019-07-12 RX ORDER — BLOOD SUGAR DIAGNOSTIC
STRIP MISCELLANEOUS
Qty: 300 STRIP | Refills: 11 | Status: SHIPPED | OUTPATIENT
Start: 2019-07-12 | End: 2020-07-30 | Stop reason: SDUPTHER

## 2019-07-16 ENCOUNTER — TELEPHONE (OUTPATIENT)
Dept: INTERNAL MEDICINE CLINIC | Age: 54
End: 2019-07-16

## 2019-07-16 NOTE — TELEPHONE ENCOUNTER
[facility]  Pulmonary ASsoc [id number]      Please fax over the sleep study results fax [phone]  Dr Aleksandar Agosto

## 2019-10-23 DIAGNOSIS — E78.2 MIXED HYPERLIPIDEMIA: ICD-10-CM

## 2019-10-24 RX ORDER — ATORVASTATIN CALCIUM 40 MG/1
TABLET, FILM COATED ORAL
Qty: 90 TAB | Refills: 0 | Status: SHIPPED | OUTPATIENT
Start: 2019-10-24 | End: 2019-12-31

## 2019-12-30 DIAGNOSIS — E78.2 MIXED HYPERLIPIDEMIA: ICD-10-CM

## 2019-12-31 RX ORDER — ATORVASTATIN CALCIUM 40 MG/1
TABLET, FILM COATED ORAL
Qty: 30 TAB | Refills: 0 | Status: SHIPPED | OUTPATIENT
Start: 2019-12-31 | End: 2020-11-22 | Stop reason: SDUPTHER

## 2020-01-06 ENCOUNTER — OFFICE VISIT (OUTPATIENT)
Dept: INTERNAL MEDICINE CLINIC | Age: 55
End: 2020-01-06

## 2020-01-06 VITALS
HEIGHT: 70 IN | RESPIRATION RATE: 24 BRPM | OXYGEN SATURATION: 97 % | WEIGHT: 258.6 LBS | BODY MASS INDEX: 37.02 KG/M2 | TEMPERATURE: 98.1 F | DIASTOLIC BLOOD PRESSURE: 87 MMHG | HEART RATE: 88 BPM | SYSTOLIC BLOOD PRESSURE: 133 MMHG

## 2020-01-06 DIAGNOSIS — E78.2 MIXED HYPERLIPIDEMIA: Primary | ICD-10-CM

## 2020-01-06 DIAGNOSIS — G47.33 OSA (OBSTRUCTIVE SLEEP APNEA): ICD-10-CM

## 2020-01-06 DIAGNOSIS — E66.01 MORBID OBESITY (HCC): ICD-10-CM

## 2020-01-06 DIAGNOSIS — B96.89 BACTERIAL SINUSITIS: ICD-10-CM

## 2020-01-06 DIAGNOSIS — J32.9 BACTERIAL SINUSITIS: ICD-10-CM

## 2020-01-06 DIAGNOSIS — E66.01 OBESITY, MORBID (HCC): ICD-10-CM

## 2020-01-06 DIAGNOSIS — R05.8 POST-VIRAL COUGH SYNDROME: ICD-10-CM

## 2020-01-06 DIAGNOSIS — E11.9 CONTROLLED TYPE 2 DIABETES MELLITUS WITHOUT COMPLICATION, UNSPECIFIED WHETHER LONG TERM INSULIN USE (HCC): ICD-10-CM

## 2020-01-06 DIAGNOSIS — I10 ESSENTIAL HYPERTENSION, BENIGN: ICD-10-CM

## 2020-01-06 RX ORDER — PHENTERMINE HYDROCHLORIDE 37.5 MG/1
37.5 TABLET ORAL
Qty: 30 TAB | Refills: 3 | Status: SHIPPED | OUTPATIENT
Start: 2020-01-06 | End: 2020-06-16 | Stop reason: ALTCHOICE

## 2020-01-06 RX ORDER — PREDNISONE 10 MG/1
TABLET ORAL
Qty: 30 TAB | Refills: 0 | Status: SHIPPED | OUTPATIENT
Start: 2020-01-06 | End: 2020-01-18

## 2020-01-06 RX ORDER — CEFUROXIME AXETIL 500 MG/1
500 TABLET ORAL 2 TIMES DAILY
Qty: 14 TAB | Refills: 0 | Status: SHIPPED | OUTPATIENT
Start: 2020-01-06 | End: 2020-01-13
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
## 2020-01-07 LAB
ALBUMIN SERPL-MCNC: 4.5 G/DL (ref 3.5–5.5)
ALBUMIN/CREAT UR: 16.5 MG/G CREAT (ref 0–30)
ALBUMIN/GLOB SERPL: 2 {RATIO} (ref 1.2–2.2)
ALP SERPL-CCNC: 100 IU/L (ref 39–117)
ALT SERPL-CCNC: 33 IU/L (ref 0–44)
APPEARANCE UR: CLEAR
AST SERPL-CCNC: 19 IU/L (ref 0–40)
BASOPHILS # BLD AUTO: 0 X10E3/UL (ref 0–0.2)
BASOPHILS NFR BLD AUTO: 1 %
BILIRUB SERPL-MCNC: 0.5 MG/DL (ref 0–1.2)
BILIRUB UR QL STRIP: NEGATIVE
BUN SERPL-MCNC: 10 MG/DL (ref 6–24)
BUN/CREAT SERPL: 12 (ref 9–20)
CALCIUM SERPL-MCNC: 9.1 MG/DL (ref 8.7–10.2)
CHLORIDE SERPL-SCNC: 99 MMOL/L (ref 96–106)
CHOLEST SERPL-MCNC: 135 MG/DL (ref 100–199)
CO2 SERPL-SCNC: 25 MMOL/L (ref 20–29)
COLOR UR: YELLOW
CREAT SERPL-MCNC: 0.81 MG/DL (ref 0.76–1.27)
CREAT UR-MCNC: 115.3 MG/DL
EOSINOPHIL # BLD AUTO: 0.2 X10E3/UL (ref 0–0.4)
EOSINOPHIL NFR BLD AUTO: 3 %
ERYTHROCYTE [DISTWIDTH] IN BLOOD BY AUTOMATED COUNT: 13.1 % (ref 11.6–15.4)
EST. AVERAGE GLUCOSE BLD GHB EST-MCNC: 226 MG/DL
GLOBULIN SER CALC-MCNC: 2.3 G/DL (ref 1.5–4.5)
GLUCOSE SERPL-MCNC: 242 MG/DL (ref 65–99)
GLUCOSE UR QL: ABNORMAL
HBA1C MFR BLD: 9.5 % (ref 4.8–5.6)
HCT VFR BLD AUTO: 40.8 % (ref 37.5–51)
HDLC SERPL-MCNC: 32 MG/DL
HGB BLD-MCNC: 13.8 G/DL (ref 13–17.7)
HGB UR QL STRIP: NEGATIVE
IMM GRANULOCYTES # BLD AUTO: 0 X10E3/UL (ref 0–0.1)
IMM GRANULOCYTES NFR BLD AUTO: 0 %
KETONES UR QL STRIP: NEGATIVE
LDLC SERPL CALC-MCNC: 85 MG/DL (ref 0–99)
LEUKOCYTE ESTERASE UR QL STRIP: NEGATIVE
LYMPHOCYTES # BLD AUTO: 2.3 X10E3/UL (ref 0.7–3.1)
LYMPHOCYTES NFR BLD AUTO: 33 %
MCH RBC QN AUTO: 30.1 PG (ref 26.6–33)
MCHC RBC AUTO-ENTMCNC: 33.8 G/DL (ref 31.5–35.7)
MCV RBC AUTO: 89 FL (ref 79–97)
MICRO URNS: ABNORMAL
MICROALBUMIN UR-MCNC: 19 UG/ML
MONOCYTES # BLD AUTO: 0.5 X10E3/UL (ref 0.1–0.9)
MONOCYTES NFR BLD AUTO: 7 %
NEUTROPHILS # BLD AUTO: 4 X10E3/UL (ref 1.4–7)
NEUTROPHILS NFR BLD AUTO: 56 %
NITRITE UR QL STRIP: NEGATIVE
PH UR STRIP: 6 [PH] (ref 5–7.5)
PLATELET # BLD AUTO: 320 X10E3/UL (ref 150–450)
POTASSIUM SERPL-SCNC: 4.4 MMOL/L (ref 3.5–5.2)
PROT SERPL-MCNC: 6.8 G/DL (ref 6–8.5)
PROT UR QL STRIP: NEGATIVE
PSA SERPL-MCNC: 0.4 NG/ML (ref 0–4)
RBC # BLD AUTO: 4.59 X10E6/UL (ref 4.14–5.8)
SODIUM SERPL-SCNC: 138 MMOL/L (ref 134–144)
SP GR UR: 1.03 (ref 1–1.03)
TRIGL SERPL-MCNC: 90 MG/DL (ref 0–149)
TSH SERPL DL<=0.005 MIU/L-ACNC: 1.79 UIU/ML (ref 0.45–4.5)
UROBILINOGEN UR STRIP-MCNC: 0.2 MG/DL (ref 0.2–1)
VLDLC SERPL CALC-MCNC: 18 MG/DL (ref 5–40)
WBC # BLD AUTO: 7.1 X10E3/UL (ref 3.4–10.8)

## 2020-01-13 ENCOUNTER — TELEPHONE (OUTPATIENT)
Dept: INTERNAL MEDICINE CLINIC | Age: 55
End: 2020-01-13

## 2020-01-13 NOTE — PROGRESS NOTES
Call- Labs look great overall except diabetic control is poor. Advise restart glimepiride 4 every day in AM. Was sent in recently, confirm he can . Continue other medications in addition to current changes .  Schedule ov in 2 mos

## 2020-01-13 NOTE — TELEPHONE ENCOUNTER
----- Message from Andie Gottlieb MD sent at 1/13/2020  6:04 AM EST -----  Call- Labs look great overall except diabetic control is poor. Advise restart glimepiride 4 every day in AM. Was sent in recently, confirm he can . Continue other medications in addition to current changes .  Schedule ov in 2 mos

## 2020-02-12 DIAGNOSIS — E11.9 CONTROLLED TYPE 2 DIABETES MELLITUS WITHOUT COMPLICATION, UNSPECIFIED WHETHER LONG TERM INSULIN USE (HCC): Primary | ICD-10-CM

## 2020-02-13 RX ORDER — IBUPROFEN 600 MG/1
600 TABLET ORAL
Qty: 100 TAB | Refills: 0 | Status: SHIPPED | OUTPATIENT
Start: 2020-02-13 | End: 2020-02-25 | Stop reason: SDUPTHER

## 2020-02-13 RX ORDER — METFORMIN HYDROCHLORIDE 500 MG/1
TABLET, EXTENDED RELEASE ORAL
Qty: 360 TAB | Refills: 1 | Status: SHIPPED | OUTPATIENT
Start: 2020-02-13 | End: 2020-02-25 | Stop reason: SDUPTHER

## 2020-02-20 ENCOUNTER — TELEPHONE (OUTPATIENT)
Dept: INTERNAL MEDICINE CLINIC | Age: 55
End: 2020-02-20

## 2020-02-25 DIAGNOSIS — E11.9 CONTROLLED TYPE 2 DIABETES MELLITUS WITHOUT COMPLICATION, UNSPECIFIED WHETHER LONG TERM INSULIN USE (HCC): ICD-10-CM

## 2020-02-25 RX ORDER — METFORMIN HYDROCHLORIDE 500 MG/1
TABLET, EXTENDED RELEASE ORAL
Qty: 360 TAB | Refills: 1 | Status: SHIPPED | OUTPATIENT
Start: 2020-02-25 | End: 2020-10-16

## 2020-02-25 RX ORDER — IBUPROFEN 600 MG/1
600 TABLET ORAL
Qty: 100 TAB | Refills: 0 | Status: SHIPPED | OUTPATIENT
Start: 2020-02-25

## 2020-02-25 NOTE — TELEPHONE ENCOUNTER
[facility] mail order ph call stating that pt wants you to send his prescription  For  Metformin er 500 mg and ibuprofen 600mg to  [phone] these medication  Was sent to [facility]  instead

## 2020-02-25 NOTE — TELEPHONE ENCOUNTER
Left vm to return our call; medication sent to [facility] per request.

## 2020-03-05 ENCOUNTER — OFFICE VISIT (OUTPATIENT)
Dept: NEUROLOGY | Age: 55
End: 2020-03-05

## 2020-03-05 VITALS
HEIGHT: 70 IN | BODY MASS INDEX: 35.22 KG/M2 | HEART RATE: 94 BPM | SYSTOLIC BLOOD PRESSURE: 122 MMHG | DIASTOLIC BLOOD PRESSURE: 90 MMHG | RESPIRATION RATE: 16 BRPM | OXYGEN SATURATION: 96 % | WEIGHT: 246 LBS

## 2020-03-05 DIAGNOSIS — M79.609 PARESTHESIA AND PAIN OF LEFT EXTREMITY: ICD-10-CM

## 2020-03-05 DIAGNOSIS — R20.2 PARESTHESIA AND PAIN OF LEFT EXTREMITY: ICD-10-CM

## 2020-03-05 DIAGNOSIS — M54.16 CHRONIC RADICULAR PAIN OF LOWER BACK: Primary | ICD-10-CM

## 2020-03-05 DIAGNOSIS — G89.29 CHRONIC RADICULAR PAIN OF LOWER BACK: Primary | ICD-10-CM

## 2020-03-05 DIAGNOSIS — M54.10 RADICULAR PAIN OF LOWER EXTREMITY: ICD-10-CM

## 2020-03-05 RX ORDER — PREGABALIN 75 MG/1
75 CAPSULE ORAL 2 TIMES DAILY
Qty: 60 CAP | Refills: 2 | Status: SHIPPED | OUTPATIENT
Start: 2020-03-05 | End: 2020-05-26

## 2020-03-05 RX ORDER — PREGABALIN 75 MG/1
75 CAPSULE ORAL 2 TIMES DAILY
Qty: 60 CAP | Refills: 2 | Status: SHIPPED | OUTPATIENT
Start: 2020-03-05 | End: 2020-03-05 | Stop reason: SDUPTHER
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
## 2020-03-05 NOTE — PATIENT INSTRUCTIONS
[address] [facility]   Statement to Patients  April 1, 2014      In an effort to ensure the large volume of patient prescription refills is processed in the most efficient and expeditious manner, we are asking our patients to assist us by calling your Pharmacy for all prescription refills, this will include also your  Mail Order Pharmacy. The pharmacy will contact our office electronically to continue the refill process. Please do not wait until the last minute to call your pharmacy. We need at least 48 hours (2days) to fill prescriptions. We also encourage you to call your pharmacy before going to  your prescription to make sure it is ready. With regard to controlled substance prescription refill requests (narcotic refills) that need to be picked up at our office, we ask your cooperation by providing us with at least 72 hours (3days) notice that you will need a refill. We will not refill narcotic prescription refill requests after 4:00pm on any weekday, Monday through Thursday, or after 2:00pm on Fridays, or on the weekends. We encourage everyone to explore another way of getting your prescription refill request processed using [employer], our patient web portal through our electronic medical record system. [employer] is an efficient and effective way to communicate your medication request directly to the office and  downloadable as an chrystal on your smart phone . [employer] also features a review functionality that allows you to view your medication list as well as leave messages for your physician. Are you ready to get connected? If so please review the attatched instructions or speak to any of our staff to get you set up right away! Thank you so much for your cooperation. Should you have any questions please contact our Practice Administrator.     The Physicians and Staff,  [facility]

## 2020-03-23 ENCOUNTER — TELEPHONE (OUTPATIENT)
Dept: INTERNAL MEDICINE CLINIC | Age: 55
End: 2020-03-23

## 2020-03-23 NOTE — TELEPHONE ENCOUNTER
Informed wife (Rui) informed fax FMLA forms on 03/17/2020, confirmation received. Re-faxed forms to employer at [phone], confirmation received.

## 2020-03-23 NOTE — TELEPHONE ENCOUNTER
Layla Buck [phone]    The pt wife would like to see why the leave of work paperwork has not been filled out and faxed back .  Please fill out and fax back

## 2020-03-25 ENCOUNTER — TELEPHONE (OUTPATIENT)
Dept: INTERNAL MEDICINE CLINIC | Age: 55
End: 2020-03-25

## 2020-03-25 NOTE — TELEPHONE ENCOUNTER
Wife explained patient experiencing headache (pressure) and stomach pain, vomiting this afternoon. Doesn't feel like he has a fever. No thermometer to check. Several bowel movement but no diarrhea.  Call back # [phone]

## 2020-04-01 ENCOUNTER — TELEPHONE (OUTPATIENT)
Dept: NEUROLOGY | Age: 55
End: 2020-04-01

## 2020-04-01 NOTE — TELEPHONE ENCOUNTER
Spoke with patient, informed him  would like to r/s his follow up for after his EMG. He states his legs were swelling while on Lyrica so he stopped medication which improved swelling.

## 2020-05-12 ENCOUNTER — OFFICE VISIT (OUTPATIENT)
Dept: NEUROLOGY | Age: 55
End: 2020-05-12

## 2020-05-12 VITALS
DIASTOLIC BLOOD PRESSURE: 80 MMHG | SYSTOLIC BLOOD PRESSURE: 130 MMHG | OXYGEN SATURATION: 98 % | HEIGHT: 70 IN | RESPIRATION RATE: 16 BRPM | WEIGHT: 243 LBS | BODY MASS INDEX: 34.79 KG/M2 | HEART RATE: 82 BPM

## 2020-05-12 DIAGNOSIS — G57.12 MERALGIA PARESTHETICA OF LEFT SIDE: ICD-10-CM

## 2020-05-12 DIAGNOSIS — G89.29 CHRONIC RADICULAR PAIN OF LOWER BACK: Primary | ICD-10-CM

## 2020-05-12 DIAGNOSIS — M79.609 PARESTHESIA AND PAIN OF LEFT EXTREMITY: ICD-10-CM

## 2020-05-12 DIAGNOSIS — M54.16 CHRONIC RADICULAR PAIN OF LOWER BACK: Primary | ICD-10-CM

## 2020-05-12 DIAGNOSIS — R20.2 PARESTHESIA AND PAIN OF LEFT EXTREMITY: ICD-10-CM

## 2020-05-12 NOTE — PROGRESS NOTES
[facility]  [address] [facility], [address] [place] [address]  Phone [phone] Fax [phone]  Test Date:  2020    Patient: Manuel Mtz : 1965 Physician: Lucy Zendejas. Ant Richardson DO   Sex: Male Height: ' \" Ref Phys: Lucy Zendejas. Ant Richardson,    ID#: [id number] Weight:  lbs. Technician: Eddie Valentine. .EMG TECH     Patient Complaints:  Bilateral lower extremity paresthesias, left greater than right. NCV & EMG Findings:  All nerve conduction studies were within normal limits. All left vs. right side differences were within normal limits. All F Wave latencies were within normal limits. All F Wave left vs. right side latency differences were within normal limits. All examined muscles (as indicated in the following table) showed no evidence of electrical instability. Impression:  Extensive electrodiagnostic examination of the left lower extremity and additional nerve conduction studies of the right lower extremity reveals no abnormalities. In particular, there is no evidence of a left lumbosacral motor radiculopathy. In addition, there is no evidence of a generalized sensorimotor polyneuropathy. ___________________________  Cathy Richardson,         Nerve Conduction Studies  Anti Sensory Summary Table     Stim Site NR Peak (ms) Norm Peak (ms) P-T Amp (µV) Norm P-T Amp Onset (ms) Site1 Site2 Delta-P (ms) Dist (cm) Wil (m/s) Norm Wil (m/s)   Left Sup Peroneal Anti Sensory (Ant Lat Mall)  30°C   14 cm    2.8 <4.4 21.3 >5.0 2.4 14 cm Ant Lat Mall 2.8 10.0 36 >32   Right Sup Peroneal Anti Sensory (Ant Lat Mall)  30°C   14 cm    2.6 <4.4 17.7 >5.0 1.8 14 cm Ant Lat Mall 2.6 10.0 38 >32   Left Sural Anti Sensory (Lat Mall)  30°C   Calf    3.9 <4.0 14.7 >5.0 3.3 Calf Lat Mall 3.9 14.0 36 >35   Right Sural Anti Sensory (Lat Mall)  30°C   Calf    3.9 <4.0 14.1 >5.0 3.5 Calf Lat Mall 3.9 14.0 36 >35     Motor Summary Table     Stim Site NR Onset (ms) Norm Onset (ms) O-P Amp (mV) Norm O-P Amp Site1 Site2 Delta-0 (ms) Dist (cm) Wil (m/s) Norm Wil (m/s)   Left Peroneal Motor (Ext Dig Brev)     Ankle    4.4 <6.1 3.1 >2.5 B Fib Ankle 8.7 35.0 40 >38   B Fib    13.1  3.1  Poplt B Fib 2.5 10.0 40 >40   Poplt    15.6  2.8          Right Peroneal Motor (Ext Dig Brev)    Ankle    4.0 <6.1 2.6 >2.5 B Fib Ankle 9.2 46.0 50 >38   B Fib    13.2  2.3  Poplt B Fib 2.1 10.0 48 >40   Poplt    15.3  2.0          Left Tibial Motor (Abd Chen Brev)     Ankle    6.1 <6.1 3.4 >3.0 Knee Ankle 10.0 43.0 43 >35   Knee    16.1  3.2          Right Tibial Motor (Abd Chen Brev)   Ankle    5.6 <6.1 3.1 >3.0 Knee Ankle 10.9 41.0 38 >35   Knee    16.5  2.1            F Wave Studies     NR F-Lat (ms) Lat Norm (ms) L-R F-Lat (ms) L-R Lat Norm   Left Tibial (Mrkrs) (Abd Hallucis)        60.93 <61 0.14 <5.7   Right Tibial (Mrkrs) (Abd Hallucis)       60.79 <61 0.14 <5.7     EMG     Side Muscle Nerve Root Ins Act Fibs Psw Amp Dur Poly Recrt Int Pat Comment   Left Ext Dig Brev Dp Br Peronel L5, S1 Nml Nml Nml Nml Nml 0 Nml Nml    Left AbdHallucis MedPlantar S1-2 Nml Nml Nml Nml Nml 0 Nml Nml    Left PostTibialis Tibial L5, S1 Nml Nml Nml Nml Nml 0 Nml Nml    Left Gastroc Tibial S1-2 Nml Nml Nml Nml Nml 0 Nml Nml    Left AntTibialis Dp Br Peronel L4-5 Nml Nml Nml Nml Nml 0 Nml Nml    Left RectFemoris Femoral L2-4 Nml Nml Nml Nml Nml 0 Nml Nml          Waveforms:

## 2020-05-26 ENCOUNTER — VIRTUAL VISIT (OUTPATIENT)
Dept: NEUROLOGY | Age: 55
End: 2020-05-26

## 2020-05-26 VITALS — HEIGHT: 70 IN | BODY MASS INDEX: 34.79 KG/M2 | RESPIRATION RATE: 18 BRPM | WEIGHT: 243 LBS

## 2020-05-26 DIAGNOSIS — M54.10 RADICULAR PAIN OF LOWER EXTREMITY: ICD-10-CM

## 2020-05-26 DIAGNOSIS — G89.29 CHRONIC RADICULAR PAIN OF LOWER BACK: Primary | ICD-10-CM

## 2020-05-26 DIAGNOSIS — M51.26 HERNIATION OF LUMBAR INTERVERTEBRAL DISC: ICD-10-CM

## 2020-05-26 DIAGNOSIS — M54.16 CHRONIC RADICULAR PAIN OF LOWER BACK: Primary | ICD-10-CM
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
## 2020-06-09 ENCOUNTER — TELEPHONE (OUTPATIENT)
Dept: INTERNAL MEDICINE CLINIC | Age: 55
End: 2020-06-09

## 2020-06-09 DIAGNOSIS — I10 ESSENTIAL HYPERTENSION, BENIGN: ICD-10-CM

## 2020-06-09 RX ORDER — LOSARTAN POTASSIUM AND HYDROCHLOROTHIAZIDE 12.5; 5 MG/1; MG/1
TABLET ORAL
Qty: 30 TAB | Refills: 0 | Status: SHIPPED | OUTPATIENT
Start: 2020-06-09 | End: 2020-07-06

## 2020-06-09 NOTE — TELEPHONE ENCOUNTER
Spoke with pt - he states his mail order for his Losartan has been delayed and is requesting #30 to be sent into [facility] . Advised pt while reviewing his chart noticed he was seen on 1/6/20 - due to return in 3 months but no appt scheduled. Scheduled his fuv on 6/16/20 at 8:30 am. sent Rx to [facility] as requested.

## 2020-06-09 NOTE — TELEPHONE ENCOUNTER
----- Message from Chris Gale sent at 6/8/2020  5:15 PM EDT -----  Regarding: Dr Amada Mendoza Message/Vendor Calls    Caller's first and last name: Yonas Ross, spouse      Reason for call: Caller is reporting that pt's mail order for \"Losartan 50 mg\" has been delayed and is requesting a fill to be called in to the [facility] [facility] [address]) [phone] to get him through until his mail order is received.  Pt is completely out of medication and has been experiencing headaches      Callback required yes/no and why: yes      Best contact number(s):[phone]      Details to clarify the request:      Chris Gale

## 2020-06-16 ENCOUNTER — OFFICE VISIT (OUTPATIENT)
Dept: INTERNAL MEDICINE CLINIC | Age: 55
End: 2020-06-16

## 2020-06-16 VITALS
TEMPERATURE: 96.7 F | BODY MASS INDEX: 36.51 KG/M2 | WEIGHT: 255 LBS | OXYGEN SATURATION: 98 % | DIASTOLIC BLOOD PRESSURE: 88 MMHG | HEIGHT: 70 IN | HEART RATE: 88 BPM | RESPIRATION RATE: 16 BRPM | SYSTOLIC BLOOD PRESSURE: 137 MMHG

## 2020-06-16 DIAGNOSIS — E66.01 MORBID OBESITY (HCC): ICD-10-CM

## 2020-06-16 DIAGNOSIS — M25.512 CHRONIC LEFT SHOULDER PAIN: ICD-10-CM

## 2020-06-16 DIAGNOSIS — G89.29 CHRONIC LEFT SHOULDER PAIN: ICD-10-CM

## 2020-06-16 DIAGNOSIS — I10 ESSENTIAL HYPERTENSION, BENIGN: Primary | ICD-10-CM

## 2020-06-16 DIAGNOSIS — E11.9 CONTROLLED TYPE 2 DIABETES MELLITUS WITHOUT COMPLICATION, UNSPECIFIED WHETHER LONG TERM INSULIN USE (HCC): ICD-10-CM

## 2020-06-16 DIAGNOSIS — E78.2 MIXED HYPERLIPIDEMIA: ICD-10-CM

## 2020-06-16 RX ORDER — DICLOFENAC SODIUM 75 MG/1
75 TABLET, DELAYED RELEASE ORAL 2 TIMES DAILY
Qty: 28 TAB | Refills: 0 | Status: SHIPPED | OUTPATIENT
Start: 2020-06-16 | End: 2020-06-30

## 2020-06-16 NOTE — PROGRESS NOTES
Verified name and birth date for privacy precautions. Chart reviewed in preparation for today's visit.      Chief Complaint   Patient presents with    Hypertension    Shoulder Pain     x 1 month, no knowin injury           Health Maintenance Due   Topic    Shingrix Vaccine Age 49> (1 of 2)    Medicare Yearly Exam     Foot Exam Q1     A1C test (Diabetic or Prediabetic)          Wt Readings from Last 3 Encounters:   06/16/20 255 lb (115.7 kg)   05/26/20 243 lb (110.2 kg)   05/12/20 243 lb (110.2 kg)     Temp Readings from Last 3 Encounters:   06/16/20 (!) 96.7 °F (35.9 °C) (Oral)   01/06/20 98.1 °F (36.7 °C)   06/06/19 98 °F (36.7 °C) (Oral)     BP Readings from Last 3 Encounters:   06/16/20 (!) 145/91   05/12/20 130/80   03/05/20 122/90     Pulse Readings from Last 3 Encounters:   06/16/20 88   05/12/20 82   03/05/20 94         Learning Assessment:  :     Learning Assessment 3/5/2020 8/9/2017 7/10/2014   PRIMARY LEARNER Patient Patient Patient   HIGHEST LEVEL OF EDUCATION - PRIMARY LEARNER  - - 2 YEARS OF COLLEGE   BARRIERS PRIMARY LEARNER - - NONE   CO-LEARNER CAREGIVER - - No   PRIMARY LANGUAGE ENGLISH ENGLISH ENGLISH   LEARNER PREFERENCE PRIMARY DEMONSTRATION READING OTHER (COMMENT)   ANSWERED BY self patient self   RELATIONSHIP SELF SELF SELF       Depression Screening:  :     3 most recent PHQ Screens 6/16/2020   Little interest or pleasure in doing things Not at all   Feeling down, depressed, irritable, or hopeless Not at all   Total Score PHQ 2 0   Trouble falling or staying asleep, or sleeping too much -   Feeling tired or having little energy -   Poor appetite, weight loss, or overeating -   Feeling bad about yourself - or that you are a failure or have let yourself or your family down -   Trouble concentrating on things such as school, work, reading, or watching TV -   Moving or speaking so slowly that other people could have noticed; or the opposite being so fidgety that others notice -   Thoughts of being better off dead, or hurting yourself in some way -   PHQ 9 Score -   How difficult have these problems made it for you to do your work, take care of your home and get along with others -       Fall Risk Assessment:  :     Fall Risk Assessment, last 12 mths 6/16/2020   Able to walk? Yes   Fall in past 12 months? No       Abuse Screening:  :     Abuse Screening Questionnaire 6/16/2020 12/24/2018   Do you ever feel afraid of your partner? N N   Are you in a relationship with someone who physically or mentally threatens you? N N   Is it safe for you to go home?  Shaw Monique

## 2020-06-16 NOTE — PROGRESS NOTES
HISTORY OF PRESENT ILLNESS  Liz Hammond is a 62 y.o. male. HPI Subjective:  Isaiah Blanco is seen today for follow up of hypertension and other problems. 1. Hypertension, fair readings initially. Recheck is better, still not quite optimal, but I believe acceptable. 2. Diabetes, hyperlipidemia. He is overdue for an assessment of A1c, up to date with cholesterol. It turns out he is only taking Metformin due to medication intolerance of Glimepiride and Actos. 3. Lower extremity paresthesias. He is up to date with specialist follow up. He is up to date with pain management. Review of Systems:  Notable for left shoulder pain for the last six weeks. It has not really improved. There was no known injury, but he does have decreased range of motion and pain when he lies on that side. He has symptoms consistent with frozen shoulder. I printed the exercises and information on this condition, but if it is persistent would need to follow up with ortho. He has lost about 40 pounds. Current Outpatient Medications   Medication Sig    diclofenac EC (VOLTAREN) 75 mg EC tablet Take 1 Tab by mouth two (2) times a day for 14 days.  losartan-hydroCHLOROthiazide (HYZAAR) 50-12.5 mg per tablet TAKE 1 TABLET EVERY DAY    metFORMIN ER (GLUCOPHAGE XR) 500 mg tablet TAKE 2 TABLETS EVERY DAY WITH BREAKFAST, AND TAKE 2 TABLETS WITH  DINNER    ibuprofen (MOTRIN) 600 mg tablet Take 1 Tab by mouth every six (6) hours as needed for Pain.  atorvastatin (LIPITOR) 40 mg tablet TAKE 1 TABLET EVERY DAY    fluticasone (FLONASE) 50 mcg/actuation nasal spray 2 Sprays by Both Nostrils route daily. (Patient taking differently: 2 Sprays by Both Nostrils route daily as needed.)    cetirizine (ZYRTEC) 10 mg tablet TAKE 1 TABLET BY MOUTH DAILY. (Patient taking differently: daily as needed.)    aspirin 81 mg chewable tablet Take 1 Tab by mouth daily.     ACCU-CHEK BRONSON PLUS METER misc USE AS DIRECTED  TO  CHECK  BLOOD  SUGAR THREE TIMES DAILY    ACCU-CHEK BRONSON PLUS TEST STRP strip CHECK BLOOD SUGARS THREE TIMES DAILY.  lancets (ACCU-CHEK SOFTCLIX LANCETS) misc CHECK BLOOD SUGARS THREE TIMES DAILY    ACCU-CHEK BRONSON PLUS TEST STRP strip CHECK BLOOD SUGARS THREE TIMES DAILY.  Blood Glucose Control, Normal (ACCU-CHEK SMARTVIEW CONTRL SOL) soln USE AS DIRECTED    ACCU-CHEK SMARTVIEW TEST STRIP strip CHECK BLOOD SUGARS THREE TIMES DAILY    glucose blood VI test strips (ACCU-CHEK SMARTVIEW TEST STRIP) strip Check blood sugars three times daily. Dx E11.65.    alcohol swabs (BD SINGLE USE SWABS REGULAR) padm Check blood sugars three times daily. Dx E11.65.    glucose blood VI test strips (ACCU-CHEK BRONSON PLUS TEST STRP) strip Check blood sugars three times daily. Dx E11.65. No current facility-administered medications for this visit. Review of Systems   Constitutional: Negative for chills, fever and weight loss. Respiratory: Negative. Cardiovascular: Negative for chest pain, palpitations, leg swelling and PND. Musculoskeletal: Positive for back pain and joint pain. Negative for myalgias. Neurological: Positive for tingling and sensory change. Negative for focal weakness. Physical Exam  Vitals signs and nursing note reviewed. Constitutional:       General: He is not in acute distress. Neck:      Vascular: No carotid bruit. Cardiovascular:      Rate and Rhythm: Normal rate and regular rhythm. Heart sounds: No murmur. No friction rub. No gallop. Pulmonary:      Effort: Pulmonary effort is normal. No respiratory distress. Breath sounds: Normal breath sounds. Musculoskeletal:      Left shoulder: He exhibits decreased range of motion, tenderness and decreased strength. He exhibits no swelling, no effusion and no deformity. ASSESSMENT and PLAN  Diagnoses and all orders for this visit:    1.  Essential hypertension, benign- Continue current regimen of prescription and / or OTC medications     2. Controlled type 2 diabetes mellitus without complication, unspecified whether long term insulin use (HCC)  -     METABOLIC PANEL, BASIC  -     HEMOGLOBIN A1C WITH EAG    3. Mixed hyperlipidemia- Check lipid panel and appropriate studies to rule out med side effects in 3 months. High risk med management. 4. Morbid obesity ([address]- Diet and exercise     5. Chronic left shoulder pain  -     diclofenac EC (VOLTAREN) 75 mg EC tablet; Take 1 Tab by mouth two (2) times a day for 14 days.

## 2020-06-16 NOTE — PATIENT INSTRUCTIONS
Frozen Shoulder: Care Instructions  Your Care Instructions     Frozen shoulder is stiffness, pain, and trouble moving your shoulder. It may happen after an injury or overuse, or from a disease such as diabetes or a stroke. You may have pain that keeps you from using your shoulder. However, you need to move your shoulder. If you do not move it, it will get more stiff and sore. Your doctor may order an X-ray to make sure there is not another cause for your stiff shoulder. You can treat frozen shoulder with heat, stretching, over-the-counter pain medicines, and physical therapy. Your doctor also may inject medicine into your shoulder to reduce pain and swelling. It can take a year or more to get better. Surgery is almost never needed. Follow-up care is a key part of your treatment and safety. Be sure to make and go to all appointments, and call your doctor if you are having problems. It's also a good idea to know your test results and keep a list of the medicines you take. How can you care for yourself at home? · Take pain medicines exactly as directed. ? If the doctor gave you a prescription medicine for pain, take it as prescribed. ? If you are not taking a prescription pain medicine, ask your doctor if you can take an over-the-counter medicine. · Put a heating pad set on low or a warm, wet towel wrapped in plastic on your shoulder. The heat may make it easier to stretch your shoulder. · Follow your doctor's advice for stretches and exercises. · Go to physical therapy if your doctor suggests it. · Try these stretching exercises to reduce stiffness if your doctor says it is okay. Do the exercises slowly to avoid injury. Put a warm, wet towel on your shoulder before exercising. Stop any exercise that increases pain. ? Pendulum exercise. While leaning forward and holding onto a table or the back of a chair with your good arm, bend at the waist, allowing your affected arm to hang straight down.  Swing the affected arm back and forth like a pendulum, then in circles that start small and gradually grow larger as pain allows. Try this for about 2 or 3 minutes, several times a day. Once pain begins to go away, you can do this exercise while holding a 1- or 2-pound weight. ? Wall climbing (to the side). Stand with your side to a wall so that your fingers can just touch it. Then turn so your body is turned slightly toward the wall. Walk the fingers of your injured arm up the wall as high as pain permits. Hold that position for a count of 15 to 30 seconds. Walk your fingers down to the starting position. Repeat 2 to 4 times, trying to reach higher each time. ? Wall climbing (to the front). Face a wall, standing so your fingers can just touch it. Walk the fingers of your affected arm up the wall as high as pain permits. Hold that position for a count of 15 to 30 seconds. Slowly walk your fingers to the starting position. Repeat 2 to 4 times, trying to reach higher each time. When should you call for help? Call your doctor now or seek immediate medical care if:  · You have severe pain. · Your arm is cool or pale or changes color. · You have tingling or numbness in your arm. Watch closely for changes in your health, and be sure to contact your doctor if:  · You have increased pain. · You have new pain that develops in another area. For example, you have pain in your arm, hand, or elbow. · You do not get better as expected. Where can you learn more? Go to http://hazel-lucita.info/  Enter A766 in the search box to learn more about \"Frozen Shoulder: Care Instructions. \"  Current as of: March 2, 2020               Content Version: 12.5  © 3839-9819 [employer]. Care instructions adapted under license by [employer] (which disclaims liability or warranty for this information).  If you have questions about a medical condition or this instruction, always ask your healthcare professional. [address] any warranty or liability for your use of this information. Frozen Shoulder: Exercises  Introduction  Here are some examples of exercises for you to try. The exercises may be suggested for a condition or for rehabilitation. Start each exercise slowly. Ease off the exercises if you start to have pain. You will be told when to start these exercises and which ones will work best for you. How to do the exercises  Neck stretches   1. Look straight ahead, and tip your right ear to your right shoulder. Do not let your left shoulder rise up as you tip your head to the right. 2. Hold 15 to 30 seconds. 3. Tilt your head to the left. Do not let your right shoulder rise up as you tip your head to the left. 4. Hold for 15 to 30 seconds. 5. Repeat 2 to 4 times to each side. Shoulder rolls   1. Sit comfortably with your feet shoulder-width apart. You can also do this exercise while standing. 2. Roll your shoulders up, then back, and then down in a smooth, circular motion. 3. Repeat 2 to 4 times. Shoulder flexion (lying down)   For this exercise, you will need a wand. To make a wand, use a piece of PVC pipe or a broom handle with the broom removed. Make the wand about a foot wider than your shoulders. 1. Lie on your back, holding a wand with your hands. Your palms should face down as you hold the wand. Place your hands slightly wider than your shoulders. 2. Keeping your elbows straight, slowly raise your arms over your head until you feel a stretch in your shoulders, upper back, and chest.  3. Hold 15 to 30 seconds. 4. Repeat 2 to 4 times. Shoulder rotation (lying down)   To make a wand, use a piece of PVC pipe or a broom handle with the broom removed. Make the wand about a foot wider than your shoulders. 1. Lie on your back and hold a wand in both hands with your elbows bent and your palms up.   2. Keeping your elbows close to your body, move the wand across your body toward the arm that has pain. 3. Hold for 15 to 30 seconds. 4. Repeat 2 to 4 times. Shoulder internal rotation with towel   1. Roll up a towel lengthwise. Hold the towel above and behind your head with the arm that is not sore. 2. With your sore arm, reach behind your back and grasp the towel. 3. Using the arm above your head, pull the towel upward until you feel a stretch on the front and outside of your sore shoulder. 4. Hold 15 to 30 seconds. 5. Relax and move the towel back down to the starting position. 6. Repeat 2 to 4 times. Shoulder blade squeeze   1. While standing with your arms at your sides, squeeze your shoulder blades together. Do not raise your shoulders up as you are squeezing. 2. Hold for 6 seconds. 3. Repeat 8 to 12 times. Follow-up care is a key part of your treatment and safety. Be sure to make and go to all appointments, and call your doctor if you are having problems. It's also a good idea to know your test results and keep a list of the medicines you take. Where can you learn more? Go to http://hazel-lucita.info/  Enter R144 in the search box to learn more about \"Frozen Shoulder: Exercises. \"  Current as of: March 2, 2020               Content Version: 12.5  © 7300-2525 [employer]. Care instructions adapted under license by [employer] (which disclaims liability or warranty for this information). If you have questions about a medical condition or this instruction, always ask your healthcare professional. [place] [address] any warranty or liability for your use of this information.

## 2020-06-17 LAB
BUN SERPL-MCNC: 10 MG/DL (ref 6–24)
BUN/CREAT SERPL: 12 (ref 9–20)
CALCIUM SERPL-MCNC: 9.8 MG/DL (ref 8.7–10.2)
CHLORIDE SERPL-SCNC: 99 MMOL/L (ref 96–106)
CO2 SERPL-SCNC: 23 MMOL/L (ref 20–29)
CREAT SERPL-MCNC: 0.82 MG/DL (ref 0.76–1.27)
EST. AVERAGE GLUCOSE BLD GHB EST-MCNC: 217 MG/DL
GLUCOSE SERPL-MCNC: 197 MG/DL (ref 65–99)
HBA1C MFR BLD: 9.2 % (ref 4.8–5.6)
POTASSIUM SERPL-SCNC: 4.4 MMOL/L (ref 3.5–5.2)
SODIUM SERPL-SCNC: 139 MMOL/L (ref 134–144)

## 2020-06-28 ENCOUNTER — TELEPHONE (OUTPATIENT)
Dept: INTERNAL MEDICINE CLINIC | Age: 55
End: 2020-06-28

## 2020-06-28 NOTE — TELEPHONE ENCOUNTER
Reviewed lab - given oral med intolerance , as well as insurance limitations, advised insulin as an additional therapy. He declines presently , prefers to work harder on Diet and exercise .  follow up in 3 mos

## 2020-07-29 LAB — SARS-COV-2, NAA: NOT DETECTED

## 2020-07-30 ENCOUNTER — VIRTUAL VISIT (OUTPATIENT)
Dept: INTERNAL MEDICINE CLINIC | Age: 55
End: 2020-07-30

## 2020-07-30 ENCOUNTER — TELEPHONE (OUTPATIENT)
Dept: INTERNAL MEDICINE CLINIC | Age: 55
End: 2020-07-30

## 2020-07-30 DIAGNOSIS — J02.0 STREP PHARYNGITIS: Primary | ICD-10-CM

## 2020-07-30 RX ORDER — SERTRALINE HYDROCHLORIDE 100 MG/1
150 TABLET, FILM COATED ORAL DAILY
COMMUNITY
Start: 2020-01-14 | End: 2021-02-16 | Stop reason: SDUPTHER

## 2020-07-30 RX ORDER — GLUCOSAM/CHONDRO/HERB 149/HYAL 750-100 MG
TABLET ORAL
COMMUNITY
End: 2021-08-05

## 2020-07-30 RX ORDER — AMOXICILLIN 875 MG/1
875 TABLET, FILM COATED ORAL 2 TIMES DAILY
Qty: 14 TAB | Refills: 0 | Status: SHIPPED | OUTPATIENT
Start: 2020-07-30 | End: 2020-08-06

## 2020-07-30 RX ORDER — ACETAMINOPHEN 500 MG
500 TABLET ORAL
COMMUNITY
Start: 2020-07-27

## 2020-07-30 RX ORDER — LIDOCAINE HYDROCHLORIDE 20 MG/ML
15 SOLUTION OROPHARYNGEAL
COMMUNITY
Start: 2020-07-27 | End: 2020-09-09

## 2020-07-30 NOTE — PROGRESS NOTES
Chief Complaint   Patient presents with    Sore Throat     x 2 weeks     1. Have you been to the ER, urgent care clinic since your last visit? Hospitalized since your last visit? Yes Where: [facility] Reason for visit: Sore throat    2. Have you seen or consulted any other health care providers outside of the [address] [place] [address] since your last visit? Include any pap smears or colon screening.  No
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
## 2020-07-30 NOTE — TELEPHONE ENCOUNTER
On call- having severe sore throat , congestion. Has a hx of sinusitis . Went to [facility] , negative cxr, strep. COVID pending. advised VV tomorrow to decide on ? abx

## 2020-09-09 ENCOUNTER — OFFICE VISIT (OUTPATIENT)
Dept: INTERNAL MEDICINE CLINIC | Age: 55
End: 2020-09-09
Payer: MEDICARE

## 2020-09-09 VITALS
OXYGEN SATURATION: 98 % | HEART RATE: 100 BPM | TEMPERATURE: 97.6 F | DIASTOLIC BLOOD PRESSURE: 89 MMHG | WEIGHT: 258.6 LBS | BODY MASS INDEX: 37.02 KG/M2 | HEIGHT: 70 IN | RESPIRATION RATE: 20 BRPM | SYSTOLIC BLOOD PRESSURE: 132 MMHG

## 2020-09-09 DIAGNOSIS — E11.9 CONTROLLED TYPE 2 DIABETES MELLITUS WITHOUT COMPLICATION, UNSPECIFIED WHETHER LONG TERM INSULIN USE (HCC): ICD-10-CM

## 2020-09-09 DIAGNOSIS — M51.9 LUMBAR DISC DISEASE: ICD-10-CM

## 2020-09-09 DIAGNOSIS — E78.2 MIXED HYPERLIPIDEMIA: ICD-10-CM

## 2020-09-09 DIAGNOSIS — I10 ESSENTIAL HYPERTENSION, BENIGN: Primary | ICD-10-CM

## 2020-09-09 PROCEDURE — G8432 DEP SCR NOT DOC, RNG: HCPCS | Performed by: INTERNAL MEDICINE

## 2020-09-09 PROCEDURE — G8427 DOCREV CUR MEDS BY ELIG CLIN: HCPCS | Performed by: INTERNAL MEDICINE

## 2020-09-09 PROCEDURE — G8752 SYS BP LESS 140: HCPCS | Performed by: INTERNAL MEDICINE

## 2020-09-09 PROCEDURE — G8754 DIAS BP LESS 90: HCPCS | Performed by: INTERNAL MEDICINE

## 2020-09-09 PROCEDURE — 99214 OFFICE O/P EST MOD 30 MIN: CPT | Performed by: INTERNAL MEDICINE

## 2020-09-09 PROCEDURE — 3017F COLORECTAL CA SCREEN DOC REV: CPT | Performed by: INTERNAL MEDICINE

## 2020-09-09 PROCEDURE — 3046F HEMOGLOBIN A1C LEVEL >9.0%: CPT | Performed by: INTERNAL MEDICINE

## 2020-09-09 PROCEDURE — 2022F DILAT RTA XM EVC RTNOPTHY: CPT | Performed by: INTERNAL MEDICINE

## 2020-09-09 PROCEDURE — G8417 CALC BMI ABV UP PARAM F/U: HCPCS | Performed by: INTERNAL MEDICINE

## 2020-09-09 NOTE — PROGRESS NOTES
HISTORY OF PRESENT ILLNESS  Maxx Dsouza is a 62 y.o. male. HPI  Dictation on: 09/12/2020  4:25 PM by: Judy ROLON [[id number]]     Current Outpatient Medications   Medication Sig    sertraline (ZOLOFT) 100 mg tablet Take 150 mg by mouth daily.  omega 3-DHA-EPA-fish oil (Fish Oil) 1,000 mg (120 mg-180 mg) capsule TAKE 2 CAPSULES BY MOUTH TWICE A DAY    acetaminophen (TYLENOL) 500 mg tablet Take 500 mg by mouth every six (6) hours as needed.  losartan-hydroCHLOROthiazide (HYZAAR) 50-12.5 mg per tablet TAKE ONE TABLET BY MOUTH DAILY    metFORMIN ER (GLUCOPHAGE XR) 500 mg tablet TAKE 2 TABLETS EVERY DAY WITH BREAKFAST, AND TAKE 2 TABLETS WITH  DINNER    ibuprofen (MOTRIN) 600 mg tablet Take 1 Tab by mouth every six (6) hours as needed for Pain.  atorvastatin (LIPITOR) 40 mg tablet TAKE 1 TABLET EVERY DAY    ACCU-CHEK BRONSON PLUS METER misc USE AS DIRECTED  TO  CHECK  BLOOD  SUGAR THREE TIMES DAILY    lancets (ACCU-CHEK SOFTCLIX LANCETS) misc CHECK BLOOD SUGARS THREE TIMES DAILY    Blood Glucose Control, Normal (ACCU-CHEK SMARTVIEW CONTRL SOL) soln USE AS DIRECTED    alcohol swabs (BD SINGLE USE SWABS REGULAR) padm Check blood sugars three times daily. Dx E11.65.    glucose blood VI test strips (ACCU-CHEK BRONOSN PLUS TEST STRP) strip Check blood sugars three times daily. Dx E11.65.    fluticasone (FLONASE) 50 mcg/actuation nasal spray 2 Sprays by Both Nostrils route daily. (Patient taking differently: 2 Sprays by Both Nostrils route daily as needed.)    cetirizine (ZYRTEC) 10 mg tablet TAKE 1 TABLET BY MOUTH DAILY. (Patient taking differently: daily as needed.)    aspirin 81 mg chewable tablet Take 1 Tab by mouth daily. No current facility-administered medications for this visit. Review of Systems   Constitutional: Negative for weight loss. Respiratory: Negative. Cardiovascular: Negative for chest pain, palpitations, leg swelling and PND.    Musculoskeletal: Positive for back pain and joint pain. Negative for myalgias. Neurological: Negative for focal weakness. Physical Exam  Vitals signs and nursing note reviewed. Constitutional:       General: He is not in acute distress. Neck:      Vascular: No carotid bruit. Cardiovascular:      Rate and Rhythm: Normal rate and regular rhythm. Heart sounds: No murmur. No friction rub. No gallop. Pulmonary:      Effort: Pulmonary effort is normal. No respiratory distress. Breath sounds: Normal breath sounds. ASSESSMENT and PLAN  Diagnoses and all orders for this visit:    1. Essential hypertension, benign- Continue current regimen of prescription and / or OTC medications     2. unControlled type 2 diabetes mellitus without complication, unspecified whether long term insulin use (HCC)  -     HEMOGLOBIN A1C WITH EAG    3. Mixed hyperlipidemia  -     METABOLIC PANEL, COMPREHENSIVE  -     LIPID PANEL    4. Lumbar disc disease- See specialists  as directed.

## 2020-09-09 NOTE — Clinical Note
HISTORY OF PRESENT ILLNESS Kathia Rosa is a 62 y.o. male. HPI Subjective:  Heidi Vegas is seen today accompanied by his wife, John Reid, for follow up of diabetes, hyperlipidemia and other problems. 1. Diabetes. Previously out of control. He is intolerant of multiple medications and has been hesitant towards injectable therapy. He lost weight, but his diabetes really did not improve. 2. Hyperlipidemia. Due for routine labs. 3. Hypertension, fair readings, improved on a recheck. We will continue his current regimen for now. 4. Chronic back pain with DDD. Neurosurgery consultation is pending for the near future. Current Outpatient Medications Medication Sig  sertraline (ZOLOFT) 100 mg tablet Take 150 mg by mouth daily.  omega 3-DHA-EPA-fish oil (Fish Oil) 1,000 mg (120 mg-180 mg) capsule TAKE 2 CAPSULES BY MOUTH TWICE A DAY  acetaminophen (TYLENOL) 500 mg tablet Take 500 mg by mouth every six (6) hours as needed.  losartan-hydroCHLOROthiazide (HYZAAR) 50-12.5 mg per tablet TAKE ONE TABLET BY MOUTH DAILY  metFORMIN ER (GLUCOPHAGE XR) 500 mg tablet TAKE 2 TABLETS EVERY DAY WITH BREAKFAST, AND TAKE 2 TABLETS WITH  DINNER  ibuprofen (MOTRIN) 600 mg tablet Take 1 Tab by mouth every six (6) hours as needed for Pain.  atorvastatin (LIPITOR) 40 mg tablet TAKE 1 TABLET EVERY DAY  ACCU-CHEK BRONSON PLUS METER misc USE AS DIRECTED  TO  CHECK  BLOOD  SUGAR THREE TIMES DAILY  lancets (ACCU-CHEK SOFTCLIX LANCETS) misc CHECK BLOOD SUGARS THREE TIMES DAILY  Blood Glucose Control, Normal (ACCU-CHEK SMARTVIEW CONTRL SOL) soln USE AS DIRECTED  alcohol swabs (BD SINGLE USE SWABS REGULAR) padm Check blood sugars three times daily. Dx E11.65.  
 glucose blood VI test strips (ACCU-CHEK BRONSON PLUS TEST STRP) strip Check blood sugars three times daily. Dx E11.65.  
 fluticasone (FLONASE) 50 mcg/actuation nasal spray 2 Sprays by Both Nostrils route daily.  (Patient taking differently: 2 Sprays by Both Nostrils route daily as needed.)  cetirizine (ZYRTEC) 10 mg tablet TAKE 1 TABLET BY MOUTH DAILY. (Patient taking differently: daily as needed.)  aspirin 81 mg chewable tablet Take 1 Tab by mouth daily. No current facility-administered medications for this visit. Review of Systems Constitutional: Negative for weight loss. Respiratory: Negative. Cardiovascular: Negative for chest pain, palpitations, leg swelling and PND. Musculoskeletal: Positive for back pain and joint pain. Negative for myalgias. Neurological: Negative for focal weakness. Physical Exam 
Vitals signs and nursing note reviewed. Constitutional:   
   General: He is not in acute distress. Neck:  
   Vascular: No carotid bruit. Cardiovascular:  
   Rate and Rhythm: Normal rate and regular rhythm. Heart sounds: No murmur. No friction rub. No gallop. Pulmonary:  
   Effort: Pulmonary effort is normal. No respiratory distress. Breath sounds: Normal breath sounds. ASSESSMENT and PLAN Diagnoses and all orders for this visit: 1. Essential hypertension, benign- Continue current regimen of prescription and / or OTC medications 2. unControlled type 2 diabetes mellitus without complication, unspecified whether long term insulin use (HCC) 
-     HEMOGLOBIN A1C WITH EAG 3. Mixed hyperlipidemia -     METABOLIC PANEL, COMPREHENSIVE 
-     LIPID PANEL 4. Lumbar disc disease- See specialists  as directed.

## 2020-09-15 NOTE — PROGRESS NOTES
Subjective:  Jose Perdomo is seen today accompanied by his wife, Rabia Carbajal, for follow up of diabetes, hyperlipidemia and other problems. 1. Diabetes. Previously out of control. He is intolerant of multiple medications and has been hesitant towards injectable therapy. He lost weight, but his diabetes really did not improve. 2. Hyperlipidemia. Due for routine labs. 3. Hypertension, fair readings, improved on a recheck. We will continue his current regimen for now. 4. Chronic back pain with DDD. Neurosurgery consultation is pending for the near future.

## 2020-10-16 DIAGNOSIS — E11.9 CONTROLLED TYPE 2 DIABETES MELLITUS WITHOUT COMPLICATION, UNSPECIFIED WHETHER LONG TERM INSULIN USE (HCC): ICD-10-CM

## 2020-10-16 RX ORDER — METFORMIN HYDROCHLORIDE 500 MG/1
TABLET, EXTENDED RELEASE ORAL
Qty: 360 TAB | Refills: 1 | Status: SHIPPED | OUTPATIENT
Start: 2020-10-16 | End: 2021-08-05 | Stop reason: ALTCHOICE

## 2020-11-10 ENCOUNTER — TELEPHONE (OUTPATIENT)
Dept: INTERNAL MEDICINE CLINIC | Age: 55
End: 2020-11-10

## 2020-11-10 DIAGNOSIS — E78.2 MIXED HYPERLIPIDEMIA: ICD-10-CM

## 2020-11-10 NOTE — TELEPHONE ENCOUNTER
Ernie Moe - can you confirm with pharmacy and/or patient whether taking atorvastatin 40mg daily?  - If patient stopped - why/when? (may just likely need reordered?)      Thank you,  Tobias Alex, PharmD, [facility]  Direct: [phone]  Department, toll free: [phone], option 7      ==============================================================  CLINICAL PHARMACY: STATIN REVIEW    SUBJECTIVE:   Identified as DM care gap for [employer]: statin therapy. OBJECTIVE:  Current Outpatient Medications List Includes:   Medication Sig Dispense Refill    atorvastatin (LIPITOR) 40 mg tablet TAKE 1 TABLET EVERY DAY 30 Tab 0       Lab Results   Component Value Date/Time    Cholesterol, total 135 01/06/2020 12:06 PM    HDL Cholesterol 32 (L) 01/06/2020 12:06 PM    LDL, calculated 85 01/06/2020 12:06 PM    VLDL, calculated 18 01/06/2020 12:06 PM    Triglyceride 90 01/06/2020 12:06 PM    CHOL/HDL Ratio 5.9 (H) 07/06/2012 03:31 AM     ALT (SGPT)   Date Value Ref Range Status   01/06/2020 33 0 - 44 IU/L Final     AST (SGOT)   Date Value Ref Range Status   01/06/2020 19 0 - 40 IU/L Final       BLACK OR     BP Readings from Last 1 Encounters:   09/09/20 132/89       Social History     Tobacco Use    Smoking status: Never Smoker    Smokeless tobacco: Never Used   Substance Use Topics    Alcohol use: No     Comment: RARE         ASSESSMENT:  · Appears to currently be prescribed atorvastatin 40mg daily, however, per patient's insurance report, no prescription claims for statin therapy this year. · Appears last rx was 12/31/19 for #30, 0 refills d/t needing appt, which he has now completed.     PLAN:  - Confirm with pharmacy and/or patient if filling/taking atorvastatin 40mg daily

## 2020-11-16 NOTE — TELEPHONE ENCOUNTER
Marcus Wolff MD, patient in need of rx for past due refill.     LOV: 9/9/20  Next: to be scheduled    Thank you,  Dilcia Alex, PharmD, [facility]  Direct: [phone]  Department, toll free: [phone], option 7

## 2020-11-16 NOTE — TELEPHONE ENCOUNTER
Called patient to discuss atorvastatin. Stated that he was out of medication and needed a new Rx written. Please reach out to MD to have new Rx written. Patient stated he would like to get it filled at [facility] [address] [facility] [address] as that is where he normally gets his medications filled locally.

## 2020-11-22 RX ORDER — ATORVASTATIN CALCIUM 40 MG/1
40 TABLET, FILM COATED ORAL DAILY
Qty: 90 TAB | Refills: 1 | Status: SHIPPED | OUTPATIENT
Start: 2020-11-22 | End: 2021-09-24 | Stop reason: SDUPTHER

## 2020-11-25 NOTE — TELEPHONE ENCOUNTER
Noted atorvastatin is reordered and appears patient had appt yesterday.     =========================================================   For Pharmacy Admin Tracking Only    PHSO: PHSO Patient?: Yes  Total # of Interventions Recommended: Count: 1  - New Order #: 0 New Medication Order Reason(s):  Adherence  - Refills Provided #: 1  - Maintenance Safety Lab Monitoring #: 1  Recommended intervention potential cost savings: 1  Total Interventions Accepted: 1  Time Spent (min): 20

## 2020-11-30 NOTE — PROGRESS NOTES
After obtaining consent, and per orders of Coral Meka APRN-CNP injection of Flu vaccine given in Right deltoid by Shaw Floyd. Patient instructed to remain in clinic for 20 minutes afterwards, and to report any adverse reaction to me immediately. Immunizations Administered     Name Date Dose Route    Influenza, Quadv, IM, PF (6 mo and older Fluzone, Flulaval, Fluarix, and 3 yrs and older Afluria) 11/30/2020 0.5 mL Intramuscular    Site: Deltoid- Right    Lot: [id number]    NDC: 28605-821-19          Vaccine Information Sheet, \"Influenza - Inactivated\"  given to [address] [place] [address], or parent/legal guardian of  [address] [place] [address] and verbalized understanding. Patient responses:    Have you ever had a reaction to a flu vaccine? No  Are you able to eat eggs without adverse effects? Yes  Do you have any current illness? No  Have you ever had Guillian Redford Syndrome? No    Flu vaccine given per order. Please see immunization tab. Pt here for fuv but also wants to be seen for cold symptoms. Was seen at [facility] about 2 weeks ago for strep throat. Was test positive and treated with Augmentin x10 days. He was also tested for flu which was negative. He continues with congestion and cough. Has completed his antibiotic. Has been taking Robitussin with Codeine without relief.

## 2020-12-15 NOTE — TELEPHONE ENCOUNTER
CLINICAL PHARMACY: STATIN THERAPY REVIEW    Identified care gap per [facility] statin use in persons with diabetes and adherence     Per [facility]    Medication: Atorvastatin 40mg tab  Recent fill dates: 2020  Day supply: 90  Refills remainin  Directions: 1 tab po qd  Insurance billed: [employer]  Prescribing Provider: Alberto Niño MD       Per pharmacy patient has not picked up medication as of yet. Requested them to reprocess script using [employer] Contacted patient and informed him Rx is ready. Stated that he will  today.        Edwige Werner, [address] [facility]  Department, toll free: [phone], option 7

## 2020-12-22 NOTE — TELEPHONE ENCOUNTER
Per pharmacy patient has yet to  medication. Called patient to remind them to  medication. Left  for call back.

## 2021-01-22 ENCOUNTER — OFFICE VISIT (OUTPATIENT)
Dept: NEUROLOGY | Age: 56
End: 2021-01-22
Payer: MEDICARE

## 2021-01-22 VITALS
RESPIRATION RATE: 18 BRPM | DIASTOLIC BLOOD PRESSURE: 86 MMHG | HEART RATE: 110 BPM | SYSTOLIC BLOOD PRESSURE: 136 MMHG | OXYGEN SATURATION: 94 %

## 2021-01-22 DIAGNOSIS — G89.29 CHRONIC RADICULAR PAIN OF LOWER BACK: ICD-10-CM

## 2021-01-22 DIAGNOSIS — R20.2 PARESTHESIA OF RIGHT ARM: Primary | ICD-10-CM

## 2021-01-22 DIAGNOSIS — M54.16 CHRONIC RADICULAR PAIN OF LOWER BACK: ICD-10-CM

## 2021-01-22 PROCEDURE — G8754 DIAS BP LESS 90: HCPCS | Performed by: PSYCHIATRY & NEUROLOGY

## 2021-01-22 PROCEDURE — G8752 SYS BP LESS 140: HCPCS | Performed by: PSYCHIATRY & NEUROLOGY

## 2021-01-22 PROCEDURE — 99214 OFFICE O/P EST MOD 30 MIN: CPT | Performed by: PSYCHIATRY & NEUROLOGY

## 2021-01-22 PROCEDURE — G8432 DEP SCR NOT DOC, RNG: HCPCS | Performed by: PSYCHIATRY & NEUROLOGY

## 2021-01-22 PROCEDURE — G8427 DOCREV CUR MEDS BY ELIG CLIN: HCPCS | Performed by: PSYCHIATRY & NEUROLOGY

## 2021-01-22 PROCEDURE — G8417 CALC BMI ABV UP PARAM F/U: HCPCS | Performed by: PSYCHIATRY & NEUROLOGY

## 2021-01-22 PROCEDURE — 3017F COLORECTAL CA SCREEN DOC REV: CPT | Performed by: PSYCHIATRY & NEUROLOGY
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
## 2021-01-22 NOTE — PATIENT INSTRUCTIONS
[facility]   Statement to Patients  April 1, 2014      In an effort to ensure the large volume of patient prescription refills is processed in the most efficient and expeditious manner, we are asking our patients to assist us by calling your Pharmacy for all prescription refills, this will include also your  Mail Order Pharmacy. The pharmacy will contact our office electronically to continue the refill process. Please do not wait until the last minute to call your pharmacy. We need at least 48 hours (2days) to fill prescriptions. We also encourage you to call your pharmacy before going to  your prescription to make sure it is ready. With regard to controlled substance prescription refill requests (narcotic refills) that need to be picked up at our office, we ask your cooperation by providing us with at least 72 hours (3days) notice that you will need a refill. We will not refill narcotic prescription refill requests after 4:00pm on any weekday, Monday through Thursday, or after 2:00pm on Fridays, or on the weekends. We encourage everyone to explore another way of getting your prescription refill request processed using [email or web address], our patient web portal through our electronic medical record system. [email or web address] is an efficient and effective way to communicate your medication request directly to the office and  downloadable as an chrystal on your smart phone . [email or web address] also features a review functionality that allows you to view your medication list as well as leave messages for your physician. Are you ready to get connected? If so please review the attatched instructions or speak to any of our staff to get you set up right away! Thank you so much for your cooperation. Should you have any questions please contact our Practice Administrator.     The Physicians and Staff,  [facility]

## 2021-01-27 ENCOUNTER — DOCUMENTATION ONLY (OUTPATIENT)
Dept: NEUROLOGY | Age: 56
End: 2021-01-27

## 2021-02-10 ENCOUNTER — DOCUMENTATION ONLY (OUTPATIENT)
Dept: NEUROLOGY | Age: 56
End: 2021-02-10

## 2021-02-10 ENCOUNTER — OFFICE VISIT (OUTPATIENT)
Dept: NEUROLOGY | Age: 56
End: 2021-02-10
Payer: MEDICARE

## 2021-02-10 VITALS
RESPIRATION RATE: 16 BRPM | DIASTOLIC BLOOD PRESSURE: 80 MMHG | BODY MASS INDEX: 36.94 KG/M2 | HEART RATE: 78 BPM | OXYGEN SATURATION: 98 % | SYSTOLIC BLOOD PRESSURE: 135 MMHG | HEIGHT: 70 IN | WEIGHT: 258 LBS

## 2021-02-10 DIAGNOSIS — G56.21 ULNAR NEUROPATHY AT ELBOW OF RIGHT UPPER EXTREMITY: ICD-10-CM

## 2021-02-10 DIAGNOSIS — M54.2 CERVICALGIA: ICD-10-CM

## 2021-02-10 DIAGNOSIS — G56.01 CARPAL TUNNEL SYNDROME OF RIGHT WRIST: Primary | ICD-10-CM

## 2021-02-10 PROCEDURE — 95886 MUSC TEST DONE W/N TEST COMP: CPT | Performed by: PSYCHIATRY & NEUROLOGY

## 2021-02-10 PROCEDURE — 95909 NRV CNDJ TST 5-6 STUDIES: CPT | Performed by: PSYCHIATRY & NEUROLOGY

## 2021-02-10 NOTE — PROGRESS NOTES
[address] [facility]  [address] [facility], [address] [place] [address]  Phone [phone] Fax [phone]  Test Date:  2/10/2021    Patient: Lemuel Burns : 1965 Physician: Carlos Eduardo Otto. Leandra Hung DO   Sex: Male Height: 5' 10\" Ref Phys: Carlos Eduardo Otto. Matmartinrenato LunaAbhilash,    ID#: [id number] Weight: 258 lbs. Technician: Mattie Hill     Patient Complaints:  Right upper extremity paresthesias; Cervicalgia    NCV & EMG Findings:  Evaluation of the right ulnar motor nerve showed decreased conduction velocity (B Elbow-Wrist, 51 m/s) and decreased conduction velocity (A Elbow-B Elbow, 48 m/s). The right median/ulnar (palm) comparison nerve showed abnormal peak latency difference (Median Palm-Ulnar Palm, 0.5 ms). All remaining nerves  were within normal limits. All F Wave latencies were within normal limits. All examined muscles (as indicated in the following table) showed no evidence of electrical instability. Impression:  Extensive electrodiagnostic examination of the right upper extremity reveals changes most consistent with the followin. A right median neuropathy at or distal to the wrist, consistent with a clinical diagnosis of carpal tunnel syndrome, very mild in degree electrically. 2. No evidence of a right cervical motor radiculopathy. 3. A right ulnar motor neuropathy localized to the elbow, demyelinating in type and mild in degree electrically. ___________________________  Hudson Monisha  Leandra Hung DO        Nerve Conduction Studies  Anti Sensory Summary Table     Stim Site NR Peak (ms) Norm Peak (ms) P-T Amp (µV) Norm P-T Amp Onset (ms) Site1 Site2 Delta-P (ms) Dist (cm) Wil (m/s) Norm Wil (m/s)   Right Median Anti Sensory (2nd Digit)  32°C   Wrist    3.6 <3.6 18.2 >10 2.8 Wrist 2nd Digit 3.6 14.0 39 >39   Elbow    3.8  15.5  2.8 Elbow Wrist 0.2 0.0  >48   Right Radial Anti Sensory (Base 1st Digit)  32°C   Wrist    2.3 <3.1 2.8 1.7 Wrist Base 1st Digit 2.3 0.0     Right Ulnar Anti Sensory (5th Digit)  32°C   Wrist    3.5 <3.7 19.6 >15.0 2.8 Wrist 5th Digit 3.5 14.0 40 >38     Motor Summary Table     Stim Site NR Onset (ms) Norm Onset (ms) O-P Amp (mV) Norm O-P Amp Site1 Site2 Delta-0 (ms) Dist (cm) Wil (m/s) Norm Wil (m/s)   Right Median Motor (Abd Poll Brev)  32°C   Wrist    4.1 <4.2 7.3 >5 Elbow Wrist 4.3 23.0 53 >50   Elbow    8.4  7.2          Right Ulnar Motor (Abd Dig Minimi)  32°C   Wrist    3.1 <4.2 8.6 >3 B Elbow Wrist 4.3 22.0 51 >53   B Elbow    7.4  7.6  A Elbow B Elbow 2.1 10.0 48 >53   A Elbow    9.5  7.5            Comparison Summary Table     Stim Site NR Peak (ms) Norm Peak (ms) P-T Amp (µV) Site1 Site2 Delta-P (ms) Norm Delta (ms)   Right Median/Ulnar Palm Comparison (Wrist - 8cm)  32°C   Median Palm    2.0 <2.5 10.3 Median Palm Ulnar Palm 0.5 <0.3   Ulnar Palm    1.5 <2.5 8.2         F Wave Studies     NR F-Lat (ms) Lat Norm (ms) L-R F-Lat (ms) L-R Lat Norm   Right Ulnar (Mrkrs) (Abd Dig Min)  32°C      35.96 <36  <2.5     EMG     Side Muscle Nerve Root Ins Act Fibs Psw Amp Dur Poly Recrt Int [place] Comment   Right 1stDorInt Ulnar C8-T1 Nml Nml Nml Nml Nml 0 Nml Nml    Right FlexPolLong Median (Ant Int) C7-8 Nml Nml Nml Nml Nml 0 Nml Nml    Right Ext Indicis Radial (Post Int) C7-8 Nml Nml Nml Nml Nml 0 Nml Nml    Right Biceps Musculocut C5-6 Nml Nml Nml Nml Nml 0 Nml Nml    Right Triceps Radial C6-7-8 Nml Nml Nml Nml Nml 0 Nml Nml    Right Deltoid Axillary C5-6 Nml Nml Nml Nml Nml 0 Nml Nml          Waveforms:

## 2021-02-11 NOTE — TELEPHONE ENCOUNTER
Pt says that the [place] has not sent him this refill on certrizine yet and he wonders if 30 days worth could be sent to [facility] for him

## 2021-02-15 RX ORDER — CETIRIZINE HCL 10 MG
TABLET ORAL
Qty: 30 TAB | Refills: 0 | Status: SHIPPED
Start: 2021-02-15 | End: 2021-08-05

## 2021-02-16 ENCOUNTER — TELEPHONE (OUTPATIENT)
Dept: INTERNAL MEDICINE CLINIC | Age: 56
End: 2021-02-16

## 2021-02-16 RX ORDER — SERTRALINE HYDROCHLORIDE 100 MG/1
100 TABLET, FILM COATED ORAL DAILY
Qty: 30 TAB | Refills: 0 | Status: SHIPPED | OUTPATIENT
Start: 2021-02-16 | End: 2021-03-17

## 2021-02-16 NOTE — TELEPHONE ENCOUNTER
Pt  Wants you to order sertraline  hcl 100mg tabs  [facility]  Any question call pt at [phone]   Going to ph on file

## 2021-02-18 ENCOUNTER — TELEPHONE (OUTPATIENT)
Dept: INTERNAL MEDICINE CLINIC | Age: 56
End: 2021-02-18

## 2021-02-18 NOTE — TELEPHONE ENCOUNTER
Please call patient due for follow up appointment this month. Pls remind patient he has overdue labs when you make appt- TWO sets of labs - he needs to get BOTH of these done.   SEPT and NOV labs

## 2021-02-19 NOTE — TELEPHONE ENCOUNTER
Patient scheduled 3/1/21 @ 10:30. He will come by office today to  lab slip. Advised to complete before scheduled appt. Understanding verbalized.

## 2021-02-22 ENCOUNTER — HOSPITAL ENCOUNTER (OUTPATIENT)
Dept: MRI IMAGING | Age: 56
Discharge: HOME | End: 2021-02-22
Attending: PSYCHIATRY & NEUROLOGY
Payer: MEDICARE

## 2021-02-22 DIAGNOSIS — M54.2 CERVICALGIA: ICD-10-CM

## 2021-02-22 PROCEDURE — 72141 MRI NECK SPINE W/O DYE: CPT

## 2021-03-09 ENCOUNTER — TELEPHONE (OUTPATIENT)
Dept: NEUROLOGY | Age: 56
End: 2021-03-09

## 2021-03-09 NOTE — TELEPHONE ENCOUNTER
Spoke with patient, informed him of recent MRI results. Confirmed his appointment with Chalo Dyson for 3/16.

## 2021-03-10 ENCOUNTER — TELEPHONE (OUTPATIENT)
Dept: INTERNAL MEDICINE CLINIC | Age: 56
End: 2021-03-10

## 2021-03-10 DIAGNOSIS — I10 ESSENTIAL HYPERTENSION, BENIGN: ICD-10-CM

## 2021-03-10 DIAGNOSIS — E11.9 CONTROLLED TYPE 2 DIABETES MELLITUS WITHOUT COMPLICATION, UNSPECIFIED WHETHER LONG TERM INSULIN USE (HCC): ICD-10-CM

## 2021-03-10 DIAGNOSIS — E78.2 MIXED HYPERLIPIDEMIA: ICD-10-CM

## 2021-03-10 DIAGNOSIS — Z12.5 SCREENING FOR PROSTATE CANCER: ICD-10-CM

## 2021-03-10 DIAGNOSIS — Z79.899 ENCOUNTER FOR LONG-TERM (CURRENT) USE OF OTHER MEDICATIONS: Primary | ICD-10-CM

## 2021-03-11 RX ORDER — BLOOD SUGAR DIAGNOSTIC
STRIP MISCELLANEOUS
Qty: 300 STRIP | Refills: 3 | Status: SHIPPED | OUTPATIENT
Start: 2021-03-11

## 2021-03-16 ENCOUNTER — OFFICE VISIT (OUTPATIENT)
Dept: NEUROLOGY | Age: 56
End: 2021-03-16
Payer: MEDICARE

## 2021-03-16 VITALS
HEART RATE: 88 BPM | OXYGEN SATURATION: 98 % | DIASTOLIC BLOOD PRESSURE: 86 MMHG | SYSTOLIC BLOOD PRESSURE: 140 MMHG | RESPIRATION RATE: 18 BRPM

## 2021-03-16 DIAGNOSIS — G56.21 ULNAR NEUROPATHY AT ELBOW OF RIGHT UPPER EXTREMITY: ICD-10-CM

## 2021-03-16 DIAGNOSIS — M54.16 CHRONIC RADICULAR PAIN OF LOWER BACK: ICD-10-CM

## 2021-03-16 DIAGNOSIS — G89.29 CHRONIC RADICULAR PAIN OF LOWER BACK: ICD-10-CM

## 2021-03-16 DIAGNOSIS — G56.01 CARPAL TUNNEL SYNDROME OF RIGHT WRIST: Primary | ICD-10-CM

## 2021-03-16 PROCEDURE — G8417 CALC BMI ABV UP PARAM F/U: HCPCS | Performed by: PSYCHIATRY & NEUROLOGY

## 2021-03-16 PROCEDURE — 99213 OFFICE O/P EST LOW 20 MIN: CPT | Performed by: PSYCHIATRY & NEUROLOGY

## 2021-03-16 PROCEDURE — 3017F COLORECTAL CA SCREEN DOC REV: CPT | Performed by: PSYCHIATRY & NEUROLOGY

## 2021-03-16 PROCEDURE — G8754 DIAS BP LESS 90: HCPCS | Performed by: PSYCHIATRY & NEUROLOGY

## 2021-03-16 PROCEDURE — G8432 DEP SCR NOT DOC, RNG: HCPCS | Performed by: PSYCHIATRY & NEUROLOGY

## 2021-03-16 PROCEDURE — G8427 DOCREV CUR MEDS BY ELIG CLIN: HCPCS | Performed by: PSYCHIATRY & NEUROLOGY

## 2021-03-16 PROCEDURE — G8753 SYS BP > OR = 140: HCPCS | Performed by: PSYCHIATRY & NEUROLOGY
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
## 2021-03-25 ENCOUNTER — HOSPITAL ENCOUNTER (OUTPATIENT)
Dept: PHYSICAL THERAPY | Age: 56
Discharge: HOME | End: 2021-03-25
Payer: MEDICARE

## 2021-03-25 PROCEDURE — 97162 PT EVAL MOD COMPLEX 30 MIN: CPT | Performed by: PHYSICAL THERAPIST

## 2021-03-25 NOTE — PROGRESS NOTES
[facility]. Physical Therapy  [address]  [place]  Phone: [phone] Fax: [phone]     Plan of Care/Statement of Necessity for Physical Therapy Services  2-15    Patient name: Jud Bunch  : 1965  Provider#: [id number]  Referral source: Juan HernánedzrDO      Medical/Treatment Diagnosis: Cervicalgia [M54.2]     Prior Hospitalization: see medical history     Comorbidities: anxiety, arthritis, back pain, BMI over 30, depression, DM II, headaches, HTN, prior surgery , sleep dysfunction  Prior Level of Function: 20 min of exercise seldom or never  Medications: Verified on Patient Summary List  Start of Care: 3/25/21      Onset Date: 2020   The [address] [place] [address] and following information is based on the information from the initial evaluation. Assessment/ key information: The patient presents with a chief complaint of neck pain (right worse than left) with right arm numbness/tingling that is consistent with probable cervical radiculopathy. He has significantly decreased ROM (only 40 degrees to the right and 60 to the left with rotation). His decreased periscapular strength and thoracic hypomobility exacerbate his symptoms. As of note, the patient has a chronic history of lumbar DDD with radiculopathy, self reporting having 8 epidurals that did not help much. The patient will benefit from guided therapeutic interventions such as therex for strengthening and neuromuscular re-eduction, manual techniques for joint mobility and soft tissue extensibility, and modalities for pain management in order to improve functional mobility with daily activities.     Evaluation Complexity History MEDIUM  Complexity : 1-2 comorbidities / personal factors will impact the outcome/ POC ; Examination MEDIUM Complexity : 3 Standardized tests and measures addressing body structure, function, activity limitation and / or participation in recreation  ;Presentation MEDIUM Complexity : Evolving with changing characteristics  ; Clinical Decision Making MEDIUM Complexity : FOTO score of 26-74  Overall Complexity Rating: MEDIUM    Problem List: pain affecting function, decrease ROM, decrease strength, edema affecting function, impaired gait/ balance, decrease ADL/ functional abilitiies, decrease activity tolerance, decrease flexibility/ joint mobility and decrease transfer abilities   Treatment Plan may include any combination of the following: Therapeutic exercise, Therapeutic activities, Neuromuscular re-education, Physical agent/modality, Manual therapy, Patient education, Self Care training, Functional mobility training, Home safety training and Stair training  Patient / Family readiness to learn indicated by: asking questions, trying to perform skills and interest  Persons(s) to be included in education: patient (P)  Barriers to Learning/Limitations: None  Patient Goal (s): less pain  Patient Self Reported Health Status: poor  Rehabilitation Potential: good    Short Term Goals: To be accomplished in 2 treatments:                         1.) The patient will be independent with their HEP consistently for at least one week. Long Term Goals: To be accomplished in 16 treatments:                         1.) The patient will have at most 5/10 pain with daily activities. 2.) The patient will improve his cervical rotation to at least 50 degrees to the right to assist with daily activities. 3.) The patient will improve their FOTO score from 48 to at least 54 to show improvements in functional mobility. Frequency / Duration: Patient to be seen 2 times per week for 16 treatments.       Patient/ Caregiver education and instruction: self care, activity modification and exercises    [x]  Plan of care has been reviewed with PTA        Certification Period: 3/25/21-6/23/21  Wes Nascimento PT 3/25/2021     ________________________________________________________________________    I certify that the above Therapy Services are being furnished while the patient is under my care. I agree with the treatment plan and certify that this therapy is necessary.     Physician's Signature:____________________  Date:____________Time: _________         Nic Sherwood DO

## 2021-03-25 NOTE — PROGRESS NOTES
PT INITIAL EVALUATION NOTE - [facility] 2-15    Patient Name: Castro Mary  Date:3/25/2021  : 1965  [x]  Patient  Verified  Payor: Luisa Noland / Plan: [address] [place] [address] HMO / Product Type: Managed Care Medicare /    In time: 8:50am  Out time: 9:30am  Total Treatment Time (min): 40  Total Timed Codes (min): 7  1:1 Treatment Time ( [address] only): 7   Visit #: 1     Treatment Area: Cervicalgia [M54.2]    SUBJECTIVE  Pain Level (0-10 scale): 7 (7-8/10)  Any medication changes, allergies to medications, adverse drug reactions, diagnosis change, or new procedure performed?: [] No    [x] Yes (see summary sheet for update)  Subjective: The patient reports that he was in [facility] in May 2019 and did some rehab then. He has DDD in the lumbar and has stiffness in the neck. He's had 8 epidurals in the low back. He has tingling in his arm, only the right side. The neck is more on the right side. The neck and arm symptoms started around 2020. The neck may bother him more at night, sleeping on his side is more comfortable for the back. He has carpal tunnel on the right hand. He still has numbness/tingling in the legs (the left leg feels completely dead, mainly the upper portion). Heat helps. He will get more headaches on the back right side. Turning his head will bother him. OBJECTIVE/EXAMINATION  Posture: scapular protraction bilaterally  Other Observations:  Hypertonic periscapular muscles  Palpation: tender to right cervical paraspinals/suboccipitals        Cervical AROM:        R  L    Flexion    40      Extension   20, p! On the right      Side Bending   Limited bilaterally, p! On right with right sidebend      Rotation   40 p!  Down right 60      Flexibility: very tight upper traps/pecs and paraspinals    UPPER QUARTER     MUSCLE STRENGTH  KEY        R  L  0 - No Contraction   Flexion   5, p!  5  1 - Trace    Extension (elbow) 5  5  2 - Poor    Abduction  5, p!  5  3 - Fair     IR   4  4  4 - Good    ER   3+  4-  5 - Normal       Neurological: Reflexes / Sensations: nt  Special Tests: cervical decompression: + for relief    Phalens/reverse Phalens: neg    Tinnels carpal tunnel: neg    7 min Therapeutic Exercise:  [x] See flow sheet :   Rationale: increase ROM, increase strength and improve coordination to improve the patients ability to perform daily activities.           With   [x] TE   [] TA   [] Neuro   [] SC   [] other: Patient Education: [x] Review HEP    [x] Progressed/Changed HEP based on:   [x] positioning   [x] body mechanics   [] transfers   [] heat/ice application    [] other:      Other Objective/Functional Measures: FOTO Functional Measure: 48/100                         Pain Level (0-10 scale) post treatment: 7    ASSESSMENT/Changes in Function:     [x]  See Plan of [facility], PT 3/25/2021

## 2021-04-02 ENCOUNTER — HOSPITAL ENCOUNTER (OUTPATIENT)
Dept: PHYSICAL THERAPY | Age: 56
Discharge: HOME | End: 2021-04-02
Payer: MEDICARE

## 2021-04-02 PROCEDURE — 97014 ELECTRIC STIMULATION THERAPY: CPT | Performed by: PHYSICAL THERAPIST

## 2021-04-02 PROCEDURE — 97012 MECHANICAL TRACTION THERAPY: CPT | Performed by: PHYSICAL THERAPIST

## 2021-04-02 PROCEDURE — 97110 THERAPEUTIC EXERCISES: CPT | Performed by: PHYSICAL THERAPIST

## 2021-04-02 NOTE — PROGRESS NOTES
PT DAILY TREATMENT NOTE - [facility] -15    Patient Name: Dario Calvillo  Date:2021  : 1965  [x]  Patient  Verified  Payor: Deborah Betts / Plan: [address] [place] [address] HMO / Product Type: Managed Care Medicare /    In time: 7:46am  Out time: 8:56am  Total Treatment Time (min): 70  Total Timed Codes (min): 40  1:1 Treatment Time ( only): 40   Visit #:  2    Treatment Area: Cervicalgia [M54.2]    SUBJECTIVE  Pain Level (0-10 scale): 6  Any medication changes, allergies to medications, adverse drug reactions, diagnosis change, or new procedure performed?: [x] No    [] Yes (see summary sheet for update)  Subjective functional status/changes:   [] No changes reported  The patient reports that he's still getting the tingling down the arm. OBJECTIVE    Modality rationale: decrease edema, decrease inflammation, decrease pain and increase tissue extensibility to improve the patients ability to perform daily activities.    Min Type Additional Details      15 [x] Estim: []Att   [x]Unatt    []TENS instruct                  [x]IFC  []Premod   []NMES                     []Other:  []w/US   []w/ice   [x]w/heat  Position: supine  Location: cervical      15 [x]  Traction: [x] Cervical       []Lumbar                       [] Prone          [x]Supine                       [x]Intermittent   []Continuous Lbs: 17-22lbs  [] before manual  [] after manual  []w/heat    []  Ultrasound: []Continuous   [] Pulsed                       at: []1MHz   []3MHz Location:  W/cm2:    [] Paraffin         Location:   []w/heat    []  Ice     []  Heat  []  Ice massage Position:  Location:    []  Laser  []  Other: Position:  Location:      []  Vasopneumatic Device Pressure:       [] lo [] med [] hi   Temperature:      [x] Skin assessment post-treatment:  [x]intact []redness- no adverse reaction    []redness - adverse reaction:     40 min Therapeutic Exercise:  [x] See flow sheet :   Rationale: increase ROM, increase strength and improve coordination to improve the patients ability to perform daily activities. With   [x] TE   [] TA   [] Neuro   [] SC   [] other: Patient Education: [x] Review HEP    [x] Progressed/Changed HEP based on:   [x] positioning   [x] body mechanics   [] transfers   [] heat/ice application    [] other:      Other Objective/Functional Measures: none noted     Pain Level (0-10 scale) post treatment: 2    ASSESSMENT/Changes in Function:   The patient progressed with tolerance to therex and modalities for cervical radiculopathy. Patient will continue to benefit from skilled PT services to modify and progress therapeutic interventions, address functional mobility deficits, address ROM deficits, address strength deficits, analyze and address soft tissue restrictions, analyze and cue movement patterns, analyze and modify body mechanics/ergonomics, assess and modify postural abnormalities, address imbalance/dizziness and instruct in home and community integration to attain remaining goals. [x]  See Plan of Care  []  See progress note/recertification  []  See Discharge Summary         Progress towards goals / Updated goals: The patient is progressing towards goals.      PLAN  [x]  Upgrade activities as tolerated     [x]  Continue plan of care  [x]  Update interventions per flow sheet       []  Discharge due to:_  []  Other:_      Davis Guzman, PT 4/2/2021

## 2021-04-09 ENCOUNTER — HOSPITAL ENCOUNTER (OUTPATIENT)
Dept: PHYSICAL THERAPY | Age: 56
Discharge: HOME | End: 2021-04-09
Payer: MEDICARE

## 2021-04-09 PROCEDURE — 97014 ELECTRIC STIMULATION THERAPY: CPT | Performed by: PHYSICAL THERAPIST

## 2021-04-09 PROCEDURE — 97110 THERAPEUTIC EXERCISES: CPT | Performed by: PHYSICAL THERAPIST

## 2021-04-09 PROCEDURE — 97012 MECHANICAL TRACTION THERAPY: CPT | Performed by: PHYSICAL THERAPIST

## 2021-04-09 NOTE — PROGRESS NOTES
PT DAILY TREATMENT NOTE - [facility] 2-15    Patient Name: Goldie Vuong  Date:2021  : 1965  [x]  Patient  Verified  Payor: Kaitlynn Argueta / Plan: [address] [place] [address] HMO / Product Type: Managed Care Medicare /    In time: 7:04am  Out time: 8:12am  Total Treatment Time (min): 68  Total Timed Codes (min): 38  1:1 Treatment Time ( only): 38  Visit #:  3    Treatment Area: Cervicalgia [M54.2]    SUBJECTIVE  Pain Level (0-10 scale): 6  Any medication changes, allergies to medications, adverse drug reactions, diagnosis change, or new procedure performed?: [x] No    [] Yes (see summary sheet for update)  Subjective functional status/changes:   [] No changes reported  The patient reports that he felt like it helped last time, but he tightened back up again. OBJECTIVE    Modality rationale: decrease edema, decrease inflammation, decrease pain and increase tissue extensibility to improve the patients ability to perform daily activities.    Min Type Additional Details      15 [x] Estim: []Att   [x]Unatt    []TENS instruct                  [x]IFC  []Premod   []NMES                     []Other:  []w/US   []w/ice   [x]w/heat  Position: supine  Location: cervical      15 [x]  Traction: [x] Cervical       []Lumbar                       [] Prone          [x]Supine                       [x]Intermittent   []Continuous Lbs: 17-22lbs  [] before manual  [] after manual  []w/heat    []  Ultrasound: []Continuous   [] Pulsed                       at: []1MHz   []3MHz Location:  W/cm2:    [] Paraffin         Location:   []w/heat    []  Ice     []  Heat  []  Ice massage Position:  Location:    []  Laser  []  Other: Position:  Location:      []  Vasopneumatic Device Pressure:       [] lo [] med [] hi   Temperature:      [x] Skin assessment post-treatment:  [x]intact []redness- no adverse reaction    []redness - adverse reaction:     38 min Therapeutic Exercise:  [x] See flow sheet :   Rationale: increase ROM, increase strength and improve coordination to improve the patients ability to perform daily activities. With   [x] TE   [] TA   [] Neuro   [] SC   [] other: Patient Education: [x] Review HEP    [x] Progressed/Changed HEP based on:   [x] positioning   [x] body mechanics   [] transfers   [] heat/ice application    [] other:      Other Objective/Functional Measures: none noted     Pain Level (0-10 scale) post treatment: 0    ASSESSMENT/Changes in Function:   The patient progressed with therex as tolerated. Patient will continue to benefit from skilled PT services to modify and progress therapeutic interventions, address functional mobility deficits, address ROM deficits, address strength deficits, analyze and address soft tissue restrictions, analyze and cue movement patterns, analyze and modify body mechanics/ergonomics, assess and modify postural abnormalities, address imbalance/dizziness and instruct in home and community integration to attain remaining goals. [x]  See Plan of Care  []  See progress note/recertification  []  See Discharge Summary         Progress towards goals / Updated goals: The patient is progressing towards goals.      PLAN  [x]  Upgrade activities as tolerated     [x]  Continue plan of care  [x]  Update interventions per flow sheet       []  Discharge due to:_  []  Other:_      Kori Foster, PT 4/9/2021

## 2021-04-16 ENCOUNTER — HOSPITAL ENCOUNTER (OUTPATIENT)
Dept: PHYSICAL THERAPY | Age: 56
End: 2021-04-16
Payer: MEDICARE

## 2021-04-23 ENCOUNTER — HOSPITAL ENCOUNTER (OUTPATIENT)
Dept: PHYSICAL THERAPY | Age: 56
End: 2021-04-23
Payer: MEDICARE

## 2021-04-29 ENCOUNTER — OFFICE VISIT (OUTPATIENT)
Dept: INTERNAL MEDICINE CLINIC | Age: 56
End: 2021-04-29

## 2021-04-29 VITALS
RESPIRATION RATE: 20 BRPM | HEART RATE: 79 BPM | HEIGHT: 70 IN | BODY MASS INDEX: 36.51 KG/M2 | DIASTOLIC BLOOD PRESSURE: 94 MMHG | WEIGHT: 255 LBS | OXYGEN SATURATION: 97 % | SYSTOLIC BLOOD PRESSURE: 149 MMHG | TEMPERATURE: 97.5 F

## 2021-04-29 DIAGNOSIS — R20.2 PARESTHESIA OF LOWER EXTREMITY: ICD-10-CM

## 2021-04-29 DIAGNOSIS — M54.42 CHRONIC BILATERAL LOW BACK PAIN WITH BILATERAL SCIATICA: ICD-10-CM

## 2021-04-29 DIAGNOSIS — E78.2 MIXED HYPERLIPIDEMIA: ICD-10-CM

## 2021-04-29 DIAGNOSIS — Z00.00 MEDICARE ANNUAL WELLNESS VISIT, SUBSEQUENT: Primary | ICD-10-CM

## 2021-04-29 DIAGNOSIS — G89.29 CHRONIC BILATERAL LOW BACK PAIN WITH BILATERAL SCIATICA: ICD-10-CM

## 2021-04-29 DIAGNOSIS — M54.41 CHRONIC BILATERAL LOW BACK PAIN WITH BILATERAL SCIATICA: ICD-10-CM

## 2021-04-29 DIAGNOSIS — E11.65 UNCONTROLLED TYPE 2 DIABETES MELLITUS WITH HYPERGLYCEMIA (HCC): ICD-10-CM

## 2021-04-29 DIAGNOSIS — E11.65 UNCONTROLLED TYPE 2 DIABETES MELLITUS WITH HYPERGLYCEMIA (HCC): Primary | ICD-10-CM

## 2021-04-29 DIAGNOSIS — E66.01 MORBID OBESITY (HCC): ICD-10-CM

## 2021-04-29 DIAGNOSIS — Z13.31 SCREENING FOR DEPRESSION: ICD-10-CM

## 2021-04-29 DIAGNOSIS — I10 ESSENTIAL HYPERTENSION, BENIGN: ICD-10-CM

## 2021-04-29 PROCEDURE — G8417 CALC BMI ABV UP PARAM F/U: HCPCS | Performed by: INTERNAL MEDICINE

## 2021-04-29 PROCEDURE — G8753 SYS BP > OR = 140: HCPCS | Performed by: INTERNAL MEDICINE

## 2021-04-29 PROCEDURE — G8755 DIAS BP > OR = 90: HCPCS | Performed by: INTERNAL MEDICINE

## 2021-04-29 PROCEDURE — G0439 PPPS, SUBSEQ VISIT: HCPCS | Performed by: INTERNAL MEDICINE

## 2021-04-29 PROCEDURE — G8510 SCR DEP NEG, NO PLAN REQD: HCPCS | Performed by: INTERNAL MEDICINE

## 2021-04-29 PROCEDURE — 3017F COLORECTAL CA SCREEN DOC REV: CPT | Performed by: INTERNAL MEDICINE

## 2021-04-29 PROCEDURE — 3046F HEMOGLOBIN A1C LEVEL >9.0%: CPT | Performed by: INTERNAL MEDICINE

## 2021-04-29 PROCEDURE — 2022F DILAT RTA XM EVC RTNOPTHY: CPT | Performed by: INTERNAL MEDICINE

## 2021-04-29 PROCEDURE — G8427 DOCREV CUR MEDS BY ELIG CLIN: HCPCS | Performed by: INTERNAL MEDICINE
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
## 2021-04-29 NOTE — PROGRESS NOTES
Pharmacy Progress Note     Pt was referred to see me by Dr. Simba Marsh for Trulicity teaching after scheduled office visit with PCP. Discussed Trulicity's MoA, efficacy, side effects, and administration technique. Pt was concerned about affordability. Discussed eligibility for prescription assistance. Pt has Medicare Part D. There are 2 individuals in the household. Approx household income <$69,000. Pt is likely eligible for Trulicity PAP. Application was started. Will be faxed after receiving MD signature. Rx was sent to pt's pharmacy in the meantime. Scheduled virtual visit f/u with PharmD for 21. Scheduled office visit with PCP for 21. Check: Weight and Medication Adherence and BG rdgs at the next visit. There are no discontinued medications. Orders Placed This Encounter    dulaglutide (TRULICITY) 4.46 HH/0.4 mL sub-q pen     Si.5 mL by SubCUTAneous route every seven (7) days. Dispense:  2 mL     Refill:  3         Arthur Essex, PharmD, [place]  Clinical Pharmacist Specialist        For Pharmacy Admin Tracking Only     CPA in place: YES    Recommendation Provided To: Patient/Caregiver: 4 via In person   Intervention Detail: Device Training, New Rx: 1, reason: Needs Additional Therapy, Patient Access Assistance/Sample Provided and Scheduled Appointment   Gap Closed?  NO    Total # of Interventions Recommended: 4   Total # of Interventions Accepted: 4   Intervention Accepted By: Patient/Caregiver: 4   Time Spent (min): 30

## 2021-04-29 NOTE — PATIENT INSTRUCTIONS
Medicare Wellness Visit, Male    The best way to live healthy is to have a lifestyle where you eat a well-balanced diet, exercise regularly, limit alcohol use, and quit all forms of tobacco/nicotine, if applicable. Regular preventive services are another way to keep healthy. Preventive services (vaccines, screening tests, monitoring & exams) can help personalize your care plan, which helps you manage your own care. Screening tests can find health problems at the earliest stages, when they are easiest to treat. [facility] follows the current, evidence-based guidelines published by the [facility] ([facility]) when recommending preventive services for our patients. Because we follow these guidelines, sometimes recommendations change over time as research supports it. (For example, a prostate screening blood test is no longer routinely recommended for men with no symptoms). Of course, you and your doctor may decide to screen more often for some diseases, based on your risk and co-morbidities (chronic disease you are already diagnosed with). Preventive services for you include:  - Medicare offers their members a free annual wellness visit, which is time for you and your primary care provider to discuss and plan for your preventive service needs. Take advantage of this benefit every year!  -All adults over age 72 should receive the recommended pneumonia vaccines. Current USPSTF guidelines recommend a series of two vaccines for the best pneumonia protection.   -All adults should have a flu vaccine yearly and tetanus vaccine every 10 years.  -All adults age 48 and older should receive the shingles vaccines (series of two vaccines).        -All adults age 38-68 who are overweight should have a diabetes screening test once every three years.   -Other screening tests & preventive services for persons with diabetes include: an eye exam to screen for diabetic retinopathy, a kidney function test, a foot exam, and stricter control over your cholesterol.   -Cardiovascular screening for adults with routine risk involves an electrocardiogram (ECG) at intervals determined by the provider.   -Colorectal cancer screening should be done for adults age 54-65 with no increased risk factors for colorectal cancer. There are a number of acceptable methods of screening for this type of cancer. Each test has its own benefits and drawbacks. Discuss with your provider what is most appropriate for you during your annual wellness visit. The different tests include: colonoscopy (considered the best screening method), a fecal occult blood test, a fecal DNA test, and sigmoidoscopy.  -All adults born between [facility] should be screened once for Hepatitis C.  -An Abdominal Aortic Aneurysm (AAA) Screening is recommended for men age 73-68 who has ever smoked in their lifetime.      Here is a list of your current Health Maintenance items (your personalized list of preventive services) with a due date:  Health Maintenance Due   Topic Date Due    COVID-19 Vaccine (1) Never done    Shingles Vaccine (1 of 2) Never done    Diabetic Foot Care  03/11/2020    Eye Exam  03/14/2021

## 2021-04-30 ENCOUNTER — HOSPITAL ENCOUNTER (OUTPATIENT)
Dept: PHYSICAL THERAPY | Age: 56
Discharge: HOME | End: 2021-04-30
Payer: MEDICARE

## 2021-04-30 PROCEDURE — 97012 MECHANICAL TRACTION THERAPY: CPT | Performed by: PHYSICAL THERAPIST

## 2021-04-30 PROCEDURE — 97110 THERAPEUTIC EXERCISES: CPT | Performed by: PHYSICAL THERAPIST

## 2021-04-30 PROCEDURE — 97014 ELECTRIC STIMULATION THERAPY: CPT | Performed by: PHYSICAL THERAPIST

## 2021-04-30 NOTE — PROGRESS NOTES
[facility] Physical Therapy  [address] [place]  Phone: [phone] Fax: [phone]    Progress Note    Name: Silviano Malcolm   : 1965   MD: Jorge Kapoor DO       Treatment Diagnosis: Cervicalgia [M54.2]  Start of Care: 3/25/21    Visits from Start of Care: 4  Missed Visits: 2    Summary of Care: Therapy has included therex, traction, and modalities to assist with cervicalgia and right radiculopathy symptoms. Assessment / Recommendations: The patient has progressed with the following AROM cervical rotation: R= 65 (40 at eval); L= 60 (60 at eval). Therapy has been limited to 1x/wk due to the patient's schedule, but he self reports feeling better than when he started (no tingling in the hand for the past week) and is doing his home exercises. He has responded well to mechanical traction and is looking into obtaining a home unit. He does continue to have discomfort overall with continued tightness and will benefit from continued therapy to progress to a more dynamic HEP. Short Term Goals: To be accomplished in 2 treatments:                         3.) The patient will be independent with their HEP consistently for at least one week. - MET  Long Term Goals: To be accomplished in 16 treatments:                         9.) The patient will have at most 5/10 pain with daily activities. - Occasionally MET                         7.) The patient will improve his cervical rotation to at least 50 degrees to the right to assist with daily activities. - MET                         9.) The patient will improve their FOTO score from 48 to at least 54 to show improvements in functional mobility.- Progressing (53 today)    Other: Continue 1-2x/wk for 4 more treatments        Vera Fernandez, PT 2021

## 2021-04-30 NOTE — PROGRESS NOTES
PT DAILY TREATMENT NOTE - [facility] 2-15    Patient Name: Lynn Profit  Date:2021  : 1965  [x]  Patient  Verified  Payor: Bella Domingo / Plan: [address] [place] [address] HMO / Product Type: Managed Care Medicare /    In time: 7:00am  Out time: 8:14am  Total Treatment Time (min): 74  Total Timed Codes (min): 44  1:1 Treatment Time ( only): 44  Visit #:  4    Treatment Area: Cervicalgia [M54.2]    SUBJECTIVE  Pain Level (0-10 scale): 5  Any medication changes, allergies to medications, adverse drug reactions, diagnosis change, or new procedure performed?: [x] No    [] Yes (see summary sheet for update)  Subjective functional status/changes:   [] No changes reported  The patient reports that he feels like he's getting better overall, he's doing his home exercises and is looking into a home traction unit. OBJECTIVE    Modality rationale: decrease edema, decrease inflammation, decrease pain and increase tissue extensibility to improve the patients ability to perform daily activities.    Min Type Additional Details      15 [x] Estim: []Att   [x]Unatt    []TENS instruct                  [x]IFC  []Premod   []NMES                     []Other:  []w/US   []w/ice   [x]w/heat  Position: supine  Location: cervical      15 [x]  Traction: [x] Cervical       []Lumbar                       [] Prone          [x]Supine                       [x]Intermittent   []Continuous Lbs: 17-22lbs  [] before manual  [] after manual  []w/heat    []  Ultrasound: []Continuous   [] Pulsed                       at: []1MHz   []3MHz Location:  W/cm2:    [] Paraffin         Location:   []w/heat    []  Ice     []  Heat  []  Ice massage Position:  Location:    []  Laser  []  Other: Position:  Location:      []  Vasopneumatic Device Pressure:       [] lo [] med [] hi   Temperature:      [x] Skin assessment post-treatment:  [x]intact []redness- no adverse reaction    []redness  adverse reaction:     44 min Therapeutic Exercise:  [x] See flow sheet :   Rationale: increase ROM, increase strength and improve coordination to improve the patients ability to perform daily activities. With   [x] TE   [] TA   [] Neuro   [] SC   [] other: Patient Education: [x] Review HEP    [x] Progressed/Changed HEP based on:   [x] positioning   [x] body mechanics   [] transfers   [] heat/ice application    [] other:      Other Objective/Functional Measures: FOTO= 53 (48 at eval)     Pain Level (0-10 scale) post treatment: 3    ASSESSMENT/Changes in Function:   The patient progressed with ROM and strengthening overall and will continue therapy as he obtains a home unit. Patient will continue to benefit from skilled PT services to modify and progress therapeutic interventions, address functional mobility deficits, address ROM deficits, address strength deficits, analyze and address soft tissue restrictions, analyze and cue movement patterns, analyze and modify body mechanics/ergonomics, assess and modify postural abnormalities, address imbalance/dizziness and instruct in home and community integration to attain remaining goals. [x]  See Plan of Care  [x]  See progress note/recertification  []  See Discharge Summary         Progress towards goals / Updated goals: The patient is progressing towards goals.      PLAN  [x]  Upgrade activities as tolerated     [x]  Continue plan of care  [x]  Update interventions per flow sheet       []  Discharge due to:_  []  Other:_      Abeba Ahuja, PT 4/30/2021

## 2021-05-05 ENCOUNTER — TELEPHONE (OUTPATIENT)
Dept: INTERNAL MEDICINE CLINIC | Age: 56
End: 2021-05-05

## 2021-05-05 NOTE — TELEPHONE ENCOUNTER
Pharmacy Progress Note - Telephone Call    Mr. Mouna York 60 y.o. was contacted via an outbound telephone call regarding Trulicity PAP today. Attempted to leave a voicemail, but voicemail was full. Pt's Trulicity PAP has been approved. Medication should be mailed to pt's home.       Barbie Navarro, PharmD, [facility]  Clinical Pharmacist Specialist

## 2021-05-07 ENCOUNTER — HOSPITAL ENCOUNTER (OUTPATIENT)
Dept: PHYSICAL THERAPY | Age: 56
Discharge: HOME | End: 2021-05-07
Payer: MEDICARE

## 2021-05-07 PROCEDURE — 97012 MECHANICAL TRACTION THERAPY: CPT | Performed by: PHYSICAL THERAPIST

## 2021-05-07 PROCEDURE — 97110 THERAPEUTIC EXERCISES: CPT | Performed by: PHYSICAL THERAPIST

## 2021-05-07 PROCEDURE — 97014 ELECTRIC STIMULATION THERAPY: CPT | Performed by: PHYSICAL THERAPIST

## 2021-05-07 NOTE — PROGRESS NOTES
PT DAILY TREATMENT NOTE - [facility] 2-15    Patient Name: Jose Mabry  Date:2021  : 1965  [x]  Patient  Verified  Payor: Martha Mitchell / Plan: [address] [place] [address] HMO / Product Type: Managed Care Medicare /    In time: 10:03am  Out time: 11:15am  Total Treatment Time (min): 72  Total Timed Codes (min): 42  1:1 Treatment Time ( only): 42  Visit #:  5    Treatment Area: Cervicalgia [M54.2]    SUBJECTIVE  Pain Level (0-10 scale): 4  Any medication changes, allergies to medications, adverse drug reactions, diagnosis change, or new procedure performed?: [x] No    [] Yes (see summary sheet for update)  Subjective functional status/changes:   [] No changes reported  The patient reports that he's doing much better overall. OBJECTIVE    Modality rationale: decrease edema, decrease inflammation, decrease pain and increase tissue extensibility to improve the patients ability to perform daily activities.    Min Type Additional Details      15 [x] Estim: []Att   [x]Unatt    []TENS instruct                  [x]IFC  []Premod   []NMES                     []Other:  []w/US   []w/ice   [x]w/heat  Position: supine  Location: cervical      15 [x]  Traction: [x] Cervical       []Lumbar                       [] Prone          [x]Supine                       [x]Intermittent   []Continuous Lbs: 17-22lbs  [] before manual  [] after manual  []w/heat    []  Ultrasound: []Continuous   [] Pulsed                       at: []1MHz   []3MHz Location:  W/cm2:    [] Paraffin         Location:   []w/heat    []  Ice     []  Heat  []  Ice massage Position:  Location:    []  Laser  []  Other: Position:  Location:      []  Vasopneumatic Device Pressure:       [] lo [] med [] hi   Temperature:      [x] Skin assessment post-treatment:  [x]intact []redness- no adverse reaction    []redness  adverse reaction:     42 min Therapeutic Exercise:  [x] See flow sheet :   Rationale: increase ROM, increase strength and improve coordination to improve the patients ability to perform daily activities. With   [x] TE   [] TA   [] Neuro   [] SC   [] other: Patient Education: [x] Review HEP    [x] Progressed/Changed HEP based on:   [x] positioning   [x] body mechanics   [] transfers   [] heat/ice application    [] other:      Other Objective/Functional Measures: none noted    Pain Level (0-10 scale) post treatment: 0    ASSESSMENT/Changes in Function:   The patient progressed with overhead strengthening as tolerated. Patient will continue to benefit from skilled PT services to modify and progress therapeutic interventions, address functional mobility deficits, address ROM deficits, address strength deficits, analyze and address soft tissue restrictions, analyze and cue movement patterns, analyze and modify body mechanics/ergonomics, assess and modify postural abnormalities, address imbalance/dizziness and instruct in home and community integration to attain remaining goals. [x]  See Plan of Care  []  See progress note/recertification  []  See Discharge Summary         Progress towards goals / Updated goals: The patient is progressing towards goals.      PLAN  [x]  Upgrade activities as tolerated     [x]  Continue plan of care  [x]  Update interventions per flow sheet       []  Discharge due to:_  []  Other:_      Dat Grider, PT 5/7/2021

## 2021-05-17 ENCOUNTER — HOSPITAL ENCOUNTER (OUTPATIENT)
Dept: PHYSICAL THERAPY | Age: 56
Discharge: HOME | End: 2021-05-17
Payer: MEDICARE

## 2021-05-17 PROCEDURE — 97110 THERAPEUTIC EXERCISES: CPT | Performed by: PHYSICAL THERAPIST

## 2021-05-17 PROCEDURE — 97012 MECHANICAL TRACTION THERAPY: CPT | Performed by: PHYSICAL THERAPIST

## 2021-05-17 NOTE — ANCILLARY DISCHARGE INSTRUCTIONS
[facility] Physical Therapy  [address] [place]  Phone: [phone] Fax: [phone]    Discharge Summary 2-15    Patient name: Derian Mcneill  : 1965  Provider#: [id number]  Referral source: [facility],       Medical/Treatment Diagnosis: Cervicalgia [M54.2]     Prior Hospitalization: see medical history     Comorbidities: See Plan of Care  Prior Level of Function: See Plan of Care  Medications: Verified on Patient Summary List    Start of Care: 3/25/21      Onset Date: 2020   Visits from Start of Care: 6     Missed Visits: 3  Reporting Period : 3/25/21 to 21    Assessment/Summary of care: The patient has progressed with the following AROM cervical rotation: R= 68 (40 at eval); L= 68 (60 at eval). He has responded well to mechanical traction and has recently obtained a home inflatable unit for maintenance. He feel much better overall and hasn't had much/if any tingling in the hands over the past couple of weeks. He has a safe and dynamic HEP that he will utilize to maintain improvement made thus far.     Short Term Goals: To be accomplished in 2 treatments:                         5.) The patient will be independent with their HEP consistently for at least one week. - MET  Long Term Goals: To be accomplished in 16 treatments:                         1.) The patient will have at most 5/10 pain with daily activities. - MET                         4.) The patient will improve his cervical rotation to at least 50 degrees to the right to assist with daily activities. - MET                         7.) The patient will improve their FOTO score from 48 to at least 54 to show improvements in functional mobility.- MET (96 today)        RECOMMENDATIONS:  [x]Discontinue therapy: [x]Patient has reached or is progressing toward set goals     []Patient is non-compliant or has abdicated     []Due to lack of appreciable progress towards set goals     []Karime Rodriguez, PT 5/17/2021

## 2021-05-17 NOTE — PROGRESS NOTES
PT DAILY TREATMENT NOTE - [facility] 2-15    Patient Name: Massiel Dye  Date:2021  : 1965  [x]  Patient  Verified  Payor: Cookie  / Plan: [address] [place] [address] HMO / Product Type: Managed Care Medicare /    In time: 12:15pm  Out time: 1:10pm  Total Treatment Time (min): 55  Total Timed Codes (min): 40  1:1 Treatment Time ( only): 40  Visit #:  6    Treatment Area: Cervicalgia [M54.2]    SUBJECTIVE  Pain Level (0-10 scale): 4  Any medication changes, allergies to medications, adverse drug reactions, diagnosis change, or new procedure performed?: [x] No    [] Yes (see summary sheet for update)  Subjective functional status/changes:   [] No changes reported  The patient reports that he got a home traction unit (inflatable) and feel like it helps prevent his neck from tightening up. He feels much better overall. OBJECTIVE    Modality rationale: decrease edema, decrease inflammation, decrease pain and increase tissue extensibility to improve the patients ability to perform daily activities.    Min Type Additional Details      NT [x] Estim: []Att   [x]Unatt    []TENS instruct                  [x]IFC  []Premod   []NMES                     []Other:  []w/US   []w/ice   [x]w/heat  Position: supine  Location: cervical      15 [x]  Traction: [x] Cervical       []Lumbar                       [] Prone          [x]Supine                       [x]Intermittent   []Continuous Lbs: 18-23lbs  [] before manual  [] after manual  []w/heat    []  Ultrasound: []Continuous   [] Pulsed                       at: []1MHz   []3MHz Location:  W/cm2:    [] Paraffin         Location:   []w/heat    []  Ice     []  Heat  []  Ice massage Position:  Location:    []  Laser  []  Other: Position:  Location:      []  Vasopneumatic Device Pressure:       [] lo [] med [] hi   Temperature:      [x] Skin assessment post-treatment:  [x]intact []redness- no adverse reaction    []redness  adverse reaction:     40 min Therapeutic Exercise:  [x] See flow sheet :   Rationale: increase ROM, increase strength and improve coordination to improve the patients ability to perform daily activities. With   [x] TE   [] TA   [] Neuro   [] SC   [] other: Patient Education: [x] Review HEP    [x] Progressed/Changed HEP based on:   [x] positioning   [x] body mechanics   [] transfers   [] heat/ice application    [] other:      Other Objective/Functional Measures: FOTO= 96 (48 at eval)    Pain Level (0-10 scale) post treatment: 1    ASSESSMENT/Changes in Function:   The patient has progressed well and MET goals and will be discharged today. []  See Plan of Care  []  See progress note/recertification  [x]  See Discharge Summary         Progress towards goals / Updated goals: The patient has MET goals. PLAN  []  Upgrade activities as tolerated     []  Continue plan of care  []  Update interventions per flow sheet       [x]  Discharge due to: Pt. Has MET goals.    []  Other:_      Darrick Beckham, PT 5/17/2021

## 2021-05-19 ENCOUNTER — TELEPHONE (OUTPATIENT)
Dept: INTERNAL MEDICINE CLINIC | Age: 56
End: 2021-05-19

## 2021-05-19 NOTE — TELEPHONE ENCOUNTER
Left detailed message that MD requesting more detail on what he needs in letter. He can call back tomorrow or send MD My Chart letter with what he may need stated in letter.

## 2021-05-19 NOTE — TELEPHONE ENCOUNTER
PT is requesting a letter of approval for a yoga class for PTSD.      Says it is OK to communicate through [address]

## 2021-05-21 ENCOUNTER — TELEPHONE (OUTPATIENT)
Dept: INTERNAL MEDICINE CLINIC | Age: 56
End: 2021-05-21

## 2021-05-21 NOTE — LETTER
5/23/2021 1:32 PM 
 
Mr. Edwige Boyer [facility] [address] He is physically able to participate in a beginner's , basic Yoga class. Sincerely, Ashley Bradford MD

## 2021-05-24 DIAGNOSIS — I10 ESSENTIAL HYPERTENSION, BENIGN: ICD-10-CM

## 2021-05-25 RX ORDER — LOSARTAN POTASSIUM AND HYDROCHLOROTHIAZIDE 12.5; 5 MG/1; MG/1
TABLET ORAL
Qty: 30 TABLET | Refills: 4 | Status: SHIPPED | OUTPATIENT
Start: 2021-05-25 | End: 2021-11-28

## 2021-05-27 ENCOUNTER — VIRTUAL VISIT (OUTPATIENT)
Dept: INTERNAL MEDICINE CLINIC | Age: 56
End: 2021-05-27

## 2021-05-27 DIAGNOSIS — E11.65 UNCONTROLLED TYPE 2 DIABETES MELLITUS WITH HYPERGLYCEMIA (HCC): Primary | ICD-10-CM

## 2021-05-27 RX ORDER — DULAGLUTIDE 1.5 MG/.5ML
1.5 INJECTION, SOLUTION SUBCUTANEOUS
Qty: 2 ML | Refills: 4
Start: 2021-05-27 | End: 2022-05-05 | Stop reason: DRUGHIGH

## 2021-05-27 NOTE — PATIENT INSTRUCTIONS
Your A1c was 9.2% which was elevated. INCREASE Trulicity to 1.5 mg once a week on . Inject 2 pens of your current dose. Call me (number provided below) to start a refill request for the Trulicity at the higher dose when you are down to 1 box. Your blood sugars have made some great improvement! Keep up the great work with food changes. Check your blood sugars every day fasting and write them down. Try to get a few readings after dinner in a week as well. I would like to review them when we meet next. Your blood pressure has been elevated at Dr. Heron Blankenship office in the past.  Check your blood pressure at home so we can evaluate how your blood pressure is doing. Blood Sugar Goal 
Fastin-130 mg/dL 
1-2 after meals: Less than 180 mg/dL Carbohydrate Goals Meal: 30-45 grams Snacks: 15 grams Personal Goals: - Increase physical activity - 150 minutes/week is the target - biking sounded like a great option for your knees Pharmacist Contact Information: 
Elyse Aguirre, PharmD, BCGP Work Cell: [phone]
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
## 2021-07-01 ENCOUNTER — VIRTUAL VISIT (OUTPATIENT)
Dept: INTERNAL MEDICINE CLINIC | Age: 56
End: 2021-07-01

## 2021-07-01 DIAGNOSIS — E11.65 UNCONTROLLED TYPE 2 DIABETES MELLITUS WITH HYPERGLYCEMIA (HCC): Primary | ICD-10-CM

## 2021-07-01 NOTE — PATIENT INSTRUCTIONS
Your A1c was 9.5% which was elevated. Your blood sugar readings are much improved compared to your A1c.  Keep up the great work. Increasing your activity level and tracking what you eat ([id number] calorie diet) will definitely improve the numbers more.     Blood Sugar Goal  Fastin-130 mg/dL  1-2 after meals: Less than 180 mg/dL    Carbohydrate Goals  Meal: 30-45 grams   Snacks: 15 grams    Personal Goals:  - 150 minutes per week physical activity - swimming, running in the pool, elliptical, biking
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
## 2021-08-05 ENCOUNTER — OFFICE VISIT (OUTPATIENT)
Dept: INTERNAL MEDICINE CLINIC | Age: 56
End: 2021-08-05
Payer: MEDICARE

## 2021-08-05 VITALS
OXYGEN SATURATION: 97 % | RESPIRATION RATE: 20 BRPM | HEIGHT: 70 IN | DIASTOLIC BLOOD PRESSURE: 77 MMHG | TEMPERATURE: 98 F | HEART RATE: 77 BPM | BODY MASS INDEX: 36.79 KG/M2 | WEIGHT: 257 LBS | SYSTOLIC BLOOD PRESSURE: 113 MMHG

## 2021-08-05 DIAGNOSIS — I10 ESSENTIAL HYPERTENSION, BENIGN: ICD-10-CM

## 2021-08-05 DIAGNOSIS — E11.65 UNCONTROLLED TYPE 2 DIABETES MELLITUS WITH HYPERGLYCEMIA (HCC): Primary | ICD-10-CM

## 2021-08-05 DIAGNOSIS — E78.2 MIXED HYPERLIPIDEMIA: ICD-10-CM

## 2021-08-05 PROCEDURE — G8427 DOCREV CUR MEDS BY ELIG CLIN: HCPCS | Performed by: INTERNAL MEDICINE

## 2021-08-05 PROCEDURE — 2022F DILAT RTA XM EVC RTNOPTHY: CPT | Performed by: INTERNAL MEDICINE

## 2021-08-05 PROCEDURE — 99214 OFFICE O/P EST MOD 30 MIN: CPT | Performed by: INTERNAL MEDICINE

## 2021-08-05 PROCEDURE — G8417 CALC BMI ABV UP PARAM F/U: HCPCS | Performed by: INTERNAL MEDICINE

## 2021-08-05 PROCEDURE — G8754 DIAS BP LESS 90: HCPCS | Performed by: INTERNAL MEDICINE

## 2021-08-05 PROCEDURE — 3017F COLORECTAL CA SCREEN DOC REV: CPT | Performed by: INTERNAL MEDICINE

## 2021-08-05 PROCEDURE — G8510 SCR DEP NEG, NO PLAN REQD: HCPCS | Performed by: INTERNAL MEDICINE

## 2021-08-05 PROCEDURE — G8752 SYS BP LESS 140: HCPCS | Performed by: INTERNAL MEDICINE

## 2021-08-05 NOTE — PROGRESS NOTES
HISTORY OF PRESENT ILLNESS  Harriet Montgomery is a 60 y.o. male. HPI  Assessment: Brian Tesfaye is seen today for follow up of diabetes and other problems. He is accompanied by his wife Quirino. 1) Diabetes. He has moved to [address] [place] [address]. He is tolerating treatment well. He has lost some weight and feels his sugar levels are improving. They both see Clau, our pharmacist, after their visit today. 2) Hyperlipidemia. Up to date with labs. 3) Hypertension. Stable on current regimen with prescription medication. 4) Chronic pain primarily involving the back. He continues to follow up with a variety of specialists. Review of Systems   Constitutional: Negative for chills, fever and weight loss. Respiratory: Negative. Cardiovascular: Negative for chest pain, palpitations, leg swelling and PND. Musculoskeletal: Positive for back pain. Negative for myalgias. Neurological: Positive for tingling and sensory change. Negative for focal weakness. Right CTS       Physical Exam  Vitals and nursing note reviewed. Constitutional:       General: He is not in acute distress. Cardiovascular:      Rate and Rhythm: Normal rate and regular rhythm. Heart sounds: No murmur heard. No friction rub. No gallop. Pulmonary:      Effort: Pulmonary effort is normal.      Breath sounds: Normal breath sounds. ASSESSMENT and PLAN  Diagnoses and all orders for this visit:    1. Uncontrolled type 2 diabetes mellitus with hyperglycemia (HCC)  -     HEMOGLOBIN A1C WITH EAG; Future  -     METABOLIC PANEL, COMPREHENSIVE; Future    2. Essential hypertension, benign  -     METABOLIC PANEL, COMPREHENSIVE; Future    3.  Mixed hyperlipidemia

## 2021-08-20 ENCOUNTER — TELEPHONE (OUTPATIENT)
Dept: INTERNAL MEDICINE CLINIC | Age: 56
End: 2021-08-20

## 2021-08-20 ENCOUNTER — PATIENT MESSAGE (OUTPATIENT)
Dept: INTERNAL MEDICINE CLINIC | Age: 56
End: 2021-08-20

## 2021-08-20 NOTE — TELEPHONE ENCOUNTER
Alex Horn called and would like to go over his A1c results    Patient states we MAY respond via hulut.

## 2021-09-08 ENCOUNTER — VIRTUAL VISIT (OUTPATIENT)
Dept: INTERNAL MEDICINE CLINIC | Age: 56
End: 2021-09-08

## 2021-09-08 DIAGNOSIS — E11.65 UNCONTROLLED TYPE 2 DIABETES MELLITUS WITH HYPERGLYCEMIA (HCC): Primary | ICD-10-CM

## 2021-09-08 NOTE — PROGRESS NOTES
Pharmacy Progress Note     Pt was sent invite to virtual visit twice and called on the phone during appt time. No response. Visit did not take place. There are no discontinued medications. No orders of the defined types were placed in this encounter.         Fabiola IrvingD, [facility]  Clinical Pharmacist Specialist      For Pharmacy Admin Tracking Only     Time Spent (min): 5

## 2021-09-17 ENCOUNTER — VIRTUAL VISIT (OUTPATIENT)
Dept: INTERNAL MEDICINE CLINIC | Age: 56
End: 2021-09-17
Payer: MEDICARE

## 2021-09-17 DIAGNOSIS — G56.01 CARPAL TUNNEL SYNDROME, RIGHT: Primary | ICD-10-CM

## 2021-09-17 PROCEDURE — 3017F COLORECTAL CA SCREEN DOC REV: CPT | Performed by: INTERNAL MEDICINE

## 2021-09-17 PROCEDURE — G8510 SCR DEP NEG, NO PLAN REQD: HCPCS | Performed by: INTERNAL MEDICINE

## 2021-09-17 PROCEDURE — G8756 NO BP MEASURE DOC: HCPCS | Performed by: INTERNAL MEDICINE

## 2021-09-17 PROCEDURE — 99213 OFFICE O/P EST LOW 20 MIN: CPT | Performed by: INTERNAL MEDICINE

## 2021-09-17 PROCEDURE — G8427 DOCREV CUR MEDS BY ELIG CLIN: HCPCS | Performed by: INTERNAL MEDICINE

## 2021-09-17 NOTE — PROGRESS NOTES
HISTORY OF PRESENT ILLNESS  Luca Veronica is a 60 y.o. male. This is a real-time audio/video visit brought to you by our sponsors, the fine folks at [facility] [place] [employer] platform   Hand Pain   The history is provided by the patient (CTS surgery right hand scheduled for 9/22). This is a chronic problem. The problem occurs constantly. The problem has been gradually worsening. The pain is present in the right fingers and right hand. The quality of the pain is described as aching. The pain is moderate. Associated symptoms include numbness, limited range of motion, stiffness, tingling and back pain. The symptoms are aggravated by lying down and movement. Treatments tried: see med list for chronic meds. The treatment provided mild relief. FMLA forms reviewed and completed    Review of Systems   Musculoskeletal: Positive for back pain and stiffness. Seeing some improvement with latest SONU   Neurological: Positive for tingling and numbness. Physical Exam  Vitals and nursing note reviewed. Skin:     General: Skin is warm and dry. Neurological:      Mental Status: He is alert. Psychiatric:         Behavior: Behavior normal.         ASSESSMENT and PLAN  Diagnoses and all orders for this visit:    1.  Carpal tunnel syndrome, right    - See surgeon as discussed/directed

## 2021-09-17 NOTE — PROGRESS NOTES
Verified name and birth date for privacy precautions. Chart reviewed in preparation for today's visit.      Chief Complaint   Patient presents with    Other     froms          Health Maintenance Due   Topic    Shingrix Vaccine Age 50> (1 of 2)    Foot Exam Q1     Eye Exam Retinal or Dilated     Flu Vaccine (1)         Wt Readings from Last 3 Encounters:   08/05/21 257 lb (116.6 kg)   04/29/21 255 lb (115.7 kg)   02/10/21 258 lb (117 kg)     Temp Readings from Last 3 Encounters:   08/05/21 98 °F (36.7 °C) (Temporal)   04/29/21 97.5 °F (36.4 °C) (Temporal)   11/24/20 98.8 °F (37.1 °C) (Oral)     BP Readings from Last 3 Encounters:   08/05/21 113/77   04/29/21 (!) 149/94   03/16/21 (!) 140/86     Pulse Readings from Last 3 Encounters:   08/05/21 77   04/29/21 79   03/16/21 88         Learning Assessment:  :     Learning Assessment 3/5/2020 8/9/2017 7/10/2014   PRIMARY LEARNER Patient Patient Patient   HIGHEST LEVEL OF EDUCATION - PRIMARY LEARNER  - - 2 YEARS OF COLLEGE   BARRIERS PRIMARY LEARNER - - NONE   CO-LEARNER CAREGIVER - - No   PRIMARY LANGUAGE ENGLISH ENGLISH ENGLISH   LEARNER PREFERENCE PRIMARY DEMONSTRATION READING OTHER (COMMENT)   ANSWERED BY self patient self   RELATIONSHIP SELF SELF SELF       Depression Screening:  :     3 most recent PHQ Screens 9/17/2021   Little interest or pleasure in doing things Not at all   Feeling down, depressed, irritable, or hopeless Not at all   Total Score PHQ 2 0   Trouble falling or staying asleep, or sleeping too much -   Feeling tired or having little energy -   Poor appetite, weight loss, or overeating -   Feeling bad about yourself - or that you are a failure or have let yourself or your family down -   Trouble concentrating on things such as school, work, reading, or watching TV -   Moving or speaking so slowly that other people could have noticed; or the opposite being so fidgety that others notice -   Thoughts of being better off dead, or hurting yourself in some way -   PHQ 9 Score -   How difficult have these problems made it for you to do your work, take care of your home and get along with others -       Fall Risk Assessment:  :     Fall Risk Assessment, last 12 mths 4/29/2021   Able to walk? Yes   Fall in past 12 months? 0   Do you feel unsteady? 0   Are you worried about falling 1   Is TUG test greater than 12 seconds? 0   Is the gait abnormal? 0       Abuse Screening:  :     Abuse Screening Questionnaire 9/17/2021 6/16/2020 12/24/2018   Do you ever feel afraid of your partner? N N N   Are you in a relationship with someone who physically or mentally threatens you?  N N N   Is it safe for you to go home? - Y Y

## 2021-09-24 DIAGNOSIS — E78.2 MIXED HYPERLIPIDEMIA: ICD-10-CM

## 2021-09-24 RX ORDER — ATORVASTATIN CALCIUM 40 MG/1
40 TABLET, FILM COATED ORAL DAILY
Qty: 90 TABLET | Refills: 1 | Status: SHIPPED | OUTPATIENT
Start: 2021-09-24 | End: 2022-04-11

## 2021-09-24 NOTE — TELEPHONE ENCOUNTER
Requested Prescriptions     Pending Prescriptions Disp Refills    atorvastatin (LIPITOR) 40 mg tablet 90 Tablet 1     Sig: Take 1 Tablet by mouth daily.          Westly Holiday [id number] - [address], [address] [place] [address]

## 2021-11-28 DIAGNOSIS — I10 ESSENTIAL HYPERTENSION, BENIGN: ICD-10-CM

## 2021-11-28 RX ORDER — LOSARTAN POTASSIUM AND HYDROCHLOROTHIAZIDE 12.5; 5 MG/1; MG/1
TABLET ORAL
Qty: 30 TABLET | Refills: 4 | Status: SHIPPED | OUTPATIENT
Start: 2021-11-28 | End: 2022-06-13

## 2022-02-07 ENCOUNTER — TELEPHONE (OUTPATIENT)
Dept: FAMILY MEDICINE CLINIC | Age: 57
End: 2022-02-07

## 2022-02-07 NOTE — TELEPHONE ENCOUNTER
Pharmacy Progress Note - Telephone Encounter    S/O: Mr. Krissy Navarro 60 y.o. male, referred by Dr. Jo-Ann Metzger MD, was contacted via an outbound telephone call to discuss Rx assistance re-enrollment today. Verified patients identifiers (name & ) per HIPAA policy. - Pt's wife left a voicemail for this writer stating they received paperwork about re-enrolling in Rx assistance. - Pt states he has 3 weeks supply of Trulicity right now. A/P:  - Encouraged pt to sign patient portion of re-enrollment form and bring proof of income to PCP's office.  - This writer will complete the rest of the application and send off for fulfillment. - Patient endorses understanding to the provided information. All questions answered at this time. There are no discontinued medications. No orders of the defined types were placed in this encounter. Luis Antonio Galeas, PharmD, [facility]  Clinical Pharmacist Specialist      For Pharmacy Admin Tracking Only     CPA in place:  Yes   Recommendation Provided To: Patient/Caregiver: 0 via Telephone   Intervention Accepted By: Patient/Caregiver: 0   Time Spent (min): 10

## 2022-02-09 RX ORDER — SERTRALINE HYDROCHLORIDE 100 MG/1
TABLET, FILM COATED ORAL
Qty: 30 TABLET | Refills: 0 | Status: SHIPPED | OUTPATIENT
Start: 2022-02-09 | End: 2022-03-21

## 2022-02-11 ENCOUNTER — TELEPHONE (OUTPATIENT)
Dept: FAMILY MEDICINE CLINIC | Age: 57
End: 2022-02-11

## 2022-02-11 ENCOUNTER — OFFICE VISIT (OUTPATIENT)
Dept: URGENT CARE | Age: 57
End: 2022-02-11
Payer: MEDICARE

## 2022-02-11 VITALS
SYSTOLIC BLOOD PRESSURE: 148 MMHG | HEIGHT: 70 IN | TEMPERATURE: 98.4 F | DIASTOLIC BLOOD PRESSURE: 96 MMHG | WEIGHT: 259.1 LBS | OXYGEN SATURATION: 97 % | BODY MASS INDEX: 37.09 KG/M2 | RESPIRATION RATE: 18 BRPM | HEART RATE: 87 BPM

## 2022-02-11 DIAGNOSIS — R19.7 DIARRHEA, UNSPECIFIED TYPE: ICD-10-CM

## 2022-02-11 DIAGNOSIS — R05.9 COUGH: ICD-10-CM

## 2022-02-11 DIAGNOSIS — Z11.59 SCREENING FOR VIRAL DISEASE: ICD-10-CM

## 2022-02-11 DIAGNOSIS — R51.9 ACUTE NONINTRACTABLE HEADACHE, UNSPECIFIED HEADACHE TYPE: Primary | ICD-10-CM

## 2022-02-11 LAB — SARS-COV-2 PCR, POC: NEGATIVE

## 2022-02-11 PROCEDURE — G8432 DEP SCR NOT DOC, RNG: HCPCS | Performed by: FAMILY MEDICINE

## 2022-02-11 PROCEDURE — G8417 CALC BMI ABV UP PARAM F/U: HCPCS | Performed by: FAMILY MEDICINE

## 2022-02-11 PROCEDURE — 87635 SARS-COV-2 COVID-19 AMP PRB: CPT | Performed by: FAMILY MEDICINE

## 2022-02-11 PROCEDURE — 3017F COLORECTAL CA SCREEN DOC REV: CPT | Performed by: FAMILY MEDICINE

## 2022-02-11 PROCEDURE — 99202 OFFICE O/P NEW SF 15 MIN: CPT | Performed by: FAMILY MEDICINE

## 2022-02-11 PROCEDURE — G8755 DIAS BP > OR = 90: HCPCS | Performed by: FAMILY MEDICINE

## 2022-02-11 PROCEDURE — G8427 DOCREV CUR MEDS BY ELIG CLIN: HCPCS | Performed by: FAMILY MEDICINE

## 2022-02-11 PROCEDURE — G8753 SYS BP > OR = 140: HCPCS | Performed by: FAMILY MEDICINE
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
## 2022-02-16 ENCOUNTER — TELEPHONE (OUTPATIENT)
Dept: INTERNAL MEDICINE CLINIC | Age: 57
End: 2022-02-16

## 2022-02-16 NOTE — TELEPHONE ENCOUNTER
Pharmacy Progress Note - Telephone Encounter    S/O: Mr. Nga Layton 60 y.o. male, referred by Dr. Enma Young MD, was contacted via an outbound telephone call to discuss Rx assistance application for Trulicity today. Verified patients identifiers (name & ) per HIPAA policy. - Pt had dropped off application for Trulicity Rx assistance. IT was incomplete and did not include proof of income. A/P:  - This writer cannot complete application without signatures and proof of income  - Emailed pt new application and have asked pt to send back completed information.  - Patient endorses understanding to the provided information. All questions answered at this time. There are no discontinued medications. No orders of the defined types were placed in this encounter. Robert Hussein, PharmD, [facility]  Clinical Pharmacist Specialist      For Pharmacy Admin Tracking Only     CPA in place:  Yes   Recommendation Provided To: Patient/Caregiver: 1 via Telephone   Intervention Detail: Patient Access Assistance/Sample Provided   Intervention Accepted By: Patient/Caregiver: 1   Time Spent (min): 5

## 2022-02-22 ENCOUNTER — TELEPHONE (OUTPATIENT)
Dept: INTERNAL MEDICINE CLINIC | Age: 57
End: 2022-02-22

## 2022-02-22 ENCOUNTER — TELEPHONE (OUTPATIENT)
Dept: FAMILY MEDICINE CLINIC | Age: 57
End: 2022-02-22

## 2022-02-22 NOTE — TELEPHONE ENCOUNTER
Pharmacy Progress Note - Telephone Encounter    S/O: Mr. Junior 60 y.o. male, referred by Dr. Chanda Lema MD, was contacted via an outbound telephone call to discuss Rx assistance Trulicity today. Verified patients identifiers (name & ) per HIPAA policy.     - This writer was checking in to see if pt had received the emailed Rx assistance application. Pt had not completed the application appropriately - signatures and proof of income required. - Pt states he had returned the email. Email not received by this writer. A/P:  - Requested pt resend email.  - Patient endorses understanding to the provided information. All questions answered at this time. There are no discontinued medications. No orders of the defined types were placed in this encounter. Fabiola LombardoD, [facility]  Clinical Pharmacist Specialist      For Pharmacy Admin Tracking Only     CPA in place:  Yes   Recommendation Provided To: Patient/Caregiver: 1 via Telephone   Intervention Detail: Patient Access Assistance/Sample Provided   Intervention Accepted By: Patient/Caregiver: 1   Time Spent (min): 10

## 2022-02-22 NOTE — TELEPHONE ENCOUNTER
Pharmacy Progress Note - Telephone Encounter    Pt's wife contacted this writer. She requested a visit for the pt to be seen in person so Rx assistance application for Trulicity can be completed. A/P:  - Scheduled for 2/24/22 at 10AM  - Patient endorses understanding to the provided information. All questions answered at this time. There are no discontinued medications. No orders of the defined types were placed in this encounter. Bhupendra Lindsay, PharmD, [facility]  Clinical Pharmacist Specialist        For Pharmacy Admin Tracking Only     CPA in place:  Yes   Recommendation Provided To: Patient/Caregiver: 1 via Telephone   Intervention Detail: Scheduled Appointment   Intervention Accepted By: Patient/Caregiver: 1   Time Spent (min): 10

## 2022-03-03 ENCOUNTER — TELEPHONE (OUTPATIENT)
Dept: INTERNAL MEDICINE CLINIC | Age: 57
End: 2022-03-03

## 2022-03-03 NOTE — TELEPHONE ENCOUNTER
Pharmacy Progress Note - Telephone Encounter    Mr. Abdiaziz Reeves 60 y.o. male, referred by Dr. Olga Tello MD, was contacted via an outbound telephone call to discuss Rx assistance Trulicity today. Verified patients identifiers (name & ) per HIPAA policy.     - This writer received pt's signed application and updated financial paperwork. - This writer completed application and had PCP sign Rx.     - Application faxed to [address] [place], [place] [address]on  informed of update and told her financial documents would be mailed back to her.     - Patient endorses understanding to the provided information. All questions answered at this time. There are no discontinued medications. No orders of the defined types were placed in this encounter. Christophe Anton PharmD, [facility]  Clinical Pharmacist Specialist      For Pharmacy Admin Tracking Only     CPA in place:  Yes   Recommendation Provided To: Patient/Caregiver: 1 via Telephone   Intervention Detail: Patient Access Assistance/Sample Provided   Intervention Accepted By: Patient/Caregiver: 1   Time Spent (min): 30

## 2022-03-19 PROBLEM — E66.01 OBESITY, MORBID (HCC): Status: ACTIVE | Noted: 2018-03-26

## 2022-04-10 DIAGNOSIS — E78.2 MIXED HYPERLIPIDEMIA: ICD-10-CM

## 2022-04-11 RX ORDER — ATORVASTATIN CALCIUM 40 MG/1
TABLET, FILM COATED ORAL
Qty: 90 TABLET | Refills: 1 | Status: SHIPPED | OUTPATIENT
Start: 2022-04-11

## 2022-05-05 ENCOUNTER — OFFICE VISIT (OUTPATIENT)
Dept: INTERNAL MEDICINE CLINIC | Age: 57
End: 2022-05-05
Payer: MEDICARE

## 2022-05-05 ENCOUNTER — OFFICE VISIT (OUTPATIENT)
Dept: INTERNAL MEDICINE CLINIC | Age: 57
End: 2022-05-05

## 2022-05-05 VITALS
BODY MASS INDEX: 37.39 KG/M2 | HEART RATE: 86 BPM | HEIGHT: 70 IN | WEIGHT: 261.2 LBS | DIASTOLIC BLOOD PRESSURE: 83 MMHG | OXYGEN SATURATION: 97 % | TEMPERATURE: 97.5 F | RESPIRATION RATE: 20 BRPM | SYSTOLIC BLOOD PRESSURE: 133 MMHG

## 2022-05-05 VITALS — BODY MASS INDEX: 37.51 KG/M2 | WEIGHT: 261.4 LBS

## 2022-05-05 DIAGNOSIS — G89.29 CHRONIC BILATERAL LOW BACK PAIN WITH BILATERAL SCIATICA: ICD-10-CM

## 2022-05-05 DIAGNOSIS — Z12.5 SCREENING FOR PROSTATE CANCER: ICD-10-CM

## 2022-05-05 DIAGNOSIS — M54.41 CHRONIC BILATERAL LOW BACK PAIN WITH BILATERAL SCIATICA: ICD-10-CM

## 2022-05-05 DIAGNOSIS — Z12.11 SCREEN FOR COLON CANCER: ICD-10-CM

## 2022-05-05 DIAGNOSIS — M54.42 CHRONIC BILATERAL LOW BACK PAIN WITH BILATERAL SCIATICA: ICD-10-CM

## 2022-05-05 DIAGNOSIS — E11.9 CONTROLLED TYPE 2 DIABETES MELLITUS WITHOUT COMPLICATION, UNSPECIFIED WHETHER LONG TERM INSULIN USE (HCC): ICD-10-CM

## 2022-05-05 DIAGNOSIS — Z00.00 MEDICARE ANNUAL WELLNESS VISIT, SUBSEQUENT: Primary | ICD-10-CM

## 2022-05-05 DIAGNOSIS — E11.65 UNCONTROLLED TYPE 2 DIABETES MELLITUS WITH HYPERGLYCEMIA (HCC): Primary | ICD-10-CM

## 2022-05-05 DIAGNOSIS — E66.01 SEVERE OBESITY (BMI 35.0-39.9) WITH COMORBIDITY (HCC): ICD-10-CM

## 2022-05-05 DIAGNOSIS — M51.9 LUMBAR DISC DISEASE: ICD-10-CM

## 2022-05-05 DIAGNOSIS — E78.2 MIXED HYPERLIPIDEMIA: ICD-10-CM

## 2022-05-05 DIAGNOSIS — I10 ESSENTIAL HYPERTENSION, BENIGN: ICD-10-CM

## 2022-05-05 LAB — GLUCOSE POC: 204 MG/DL

## 2022-05-05 PROCEDURE — G8510 SCR DEP NEG, NO PLAN REQD: HCPCS | Performed by: INTERNAL MEDICINE

## 2022-05-05 PROCEDURE — 2022F DILAT RTA XM EVC RTNOPTHY: CPT | Performed by: INTERNAL MEDICINE

## 2022-05-05 PROCEDURE — G8752 SYS BP LESS 140: HCPCS | Performed by: INTERNAL MEDICINE

## 2022-05-05 PROCEDURE — G8754 DIAS BP LESS 90: HCPCS | Performed by: INTERNAL MEDICINE

## 2022-05-05 PROCEDURE — G8427 DOCREV CUR MEDS BY ELIG CLIN: HCPCS | Performed by: INTERNAL MEDICINE

## 2022-05-05 PROCEDURE — G0439 PPPS, SUBSEQ VISIT: HCPCS | Performed by: INTERNAL MEDICINE

## 2022-05-05 PROCEDURE — 3017F COLORECTAL CA SCREEN DOC REV: CPT | Performed by: INTERNAL MEDICINE

## 2022-05-05 PROCEDURE — 82962 GLUCOSE BLOOD TEST: CPT | Performed by: PHARMACIST

## 2022-05-05 PROCEDURE — 3046F HEMOGLOBIN A1C LEVEL >9.0%: CPT | Performed by: INTERNAL MEDICINE

## 2022-05-05 PROCEDURE — G8417 CALC BMI ABV UP PARAM F/U: HCPCS | Performed by: INTERNAL MEDICINE

## 2022-05-05 RX ORDER — DULAGLUTIDE 3 MG/.5ML
3 INJECTION, SOLUTION SUBCUTANEOUS
Qty: 4 EACH | Refills: 1
Start: 2022-05-05
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
## 2022-05-05 NOTE — PATIENT INSTRUCTIONS
Medicare Wellness Visit, Male    The best way to live healthy is to have a lifestyle where you eat a well-balanced diet, exercise regularly, limit alcohol use, and quit all forms of tobacco/nicotine, if applicable. Regular preventive services are another way to keep healthy. Preventive services (vaccines, screening tests, monitoring & exams) can help personalize your care plan, which helps you manage your own care. Screening tests can find health problems at the earliest stages, when they are easiest to treat. [facility] follows the current, evidence-based guidelines published by the [facility] ([facility]) when recommending preventive services for our patients. Because we follow these guidelines, sometimes recommendations change over time as research supports it. (For example, a prostate screening blood test is no longer routinely recommended for men with no symptoms). Of course, you and your doctor may decide to screen more often for some diseases, based on your risk and co-morbidities (chronic disease you are already diagnosed with). Preventive services for you include:  - Medicare offers their members a free annual wellness visit, which is time for you and your primary care provider to discuss and plan for your preventive service needs. Take advantage of this benefit every year!  -All adults over age 72 should receive the recommended pneumonia vaccines. Current USPSTF guidelines recommend a series of two vaccines for the best pneumonia protection.   -All adults should have a flu vaccine yearly and tetanus vaccine every 10 years.  -All adults age 48 and older should receive the shingles vaccines (series of two vaccines).        -All adults age 38-68 who are overweight should have a diabetes screening test once every three years.   -Other screening tests & preventive services for persons with diabetes include: an eye exam to screen for diabetic retinopathy, a kidney function test, a foot exam, and stricter control over your cholesterol.   -Cardiovascular screening for adults with routine risk involves an electrocardiogram (ECG) at intervals determined by the provider.   -Colorectal cancer screening should be done for adults age 54-65 with no increased risk factors for colorectal cancer. There are a number of acceptable methods of screening for this type of cancer. Each test has its own benefits and drawbacks. Discuss with your provider what is most appropriate for you during your annual wellness visit. The different tests include: colonoscopy (considered the best screening method), a fecal occult blood test, a fecal DNA test, and sigmoidoscopy.  -All adults born between [facility] should be screened once for Hepatitis C.  -An Abdominal Aortic Aneurysm (AAA) Screening is recommended for men age 73-68 who has ever smoked in their lifetime.      Here is a list of your current Health Maintenance items (your personalized list of preventive services) with a due date:  Health Maintenance Due   Topic Date Due    Shingles Vaccine (1 of 2) Never done    Diabetic Foot Care  03/11/2020    Eye Exam  03/14/2021    COVID-19 Vaccine (3 - Booster for Pfizer series) 09/16/2021    Albumin Urine Test  04/21/2022    Cholesterol Test   04/21/2022
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
## 2022-06-06 ENCOUNTER — TELEPHONE (OUTPATIENT)
Dept: NEUROLOGY | Age: 57
End: 2022-06-06

## 2022-06-06 NOTE — TELEPHONE ENCOUNTER
Stefani Sloan and [facility] [employer] called needing to set up a phone call to discuss treatment patient received. Please contact.

## 2022-06-13 DIAGNOSIS — I10 ESSENTIAL HYPERTENSION, BENIGN: ICD-10-CM

## 2022-06-13 RX ORDER — LOSARTAN POTASSIUM AND HYDROCHLOROTHIAZIDE 12.5; 5 MG/1; MG/1
TABLET ORAL
Qty: 30 TABLET | Refills: 4 | Status: SHIPPED | OUTPATIENT
Start: 2022-06-13

## 2022-07-19 ENCOUNTER — OFFICE VISIT (OUTPATIENT)
Dept: URGENT CARE | Age: 57
End: 2022-07-19
Payer: MEDICARE

## 2022-07-19 VITALS — RESPIRATION RATE: 16 BRPM | HEART RATE: 103 BPM | OXYGEN SATURATION: 100 % | TEMPERATURE: 99 F

## 2022-07-19 DIAGNOSIS — Z20.822 ENCOUNTER FOR LABORATORY TESTING FOR COVID-19 VIRUS: Primary | ICD-10-CM

## 2022-07-19 LAB — SARS-COV-2 PCR, POC: POSITIVE

## 2022-07-19 PROCEDURE — 99211 OFF/OP EST MAY X REQ PHY/QHP: CPT | Performed by: FAMILY MEDICINE

## 2022-07-19 PROCEDURE — 87635 SARS-COV-2 COVID-19 AMP PRB: CPT | Performed by: FAMILY MEDICINE

## 2022-07-20 ENCOUNTER — VIRTUAL VISIT (OUTPATIENT)
Dept: INTERNAL MEDICINE CLINIC | Age: 57
End: 2022-07-20
Payer: MEDICARE

## 2022-07-20 DIAGNOSIS — U07.1 COVID-19: Primary | ICD-10-CM

## 2022-07-20 DIAGNOSIS — E11.9 CONTROLLED TYPE 2 DIABETES MELLITUS WITHOUT COMPLICATION, WITHOUT LONG-TERM CURRENT USE OF INSULIN (HCC): ICD-10-CM

## 2022-07-20 DIAGNOSIS — J45.909 INTRINSIC ASTHMA: ICD-10-CM

## 2022-07-20 PROCEDURE — 99214 OFFICE O/P EST MOD 30 MIN: CPT | Performed by: INTERNAL MEDICINE

## 2022-07-20 RX ORDER — ALBUTEROL SULFATE 90 UG/1
1 AEROSOL, METERED RESPIRATORY (INHALATION)
Qty: 18 G | Refills: 1 | Status: SHIPPED | OUTPATIENT
Start: 2022-07-20

## 2022-07-20 NOTE — ASSESSMENT & PLAN NOTE
Typically has rare symptoms and doesn't use maintenance inhaler but now in the setting of COVID and feeling more short of breath I recommend starting steroid inhaler BID + albuterol PRN  Also recommend getting pulse ox and monitoring at home, discussed warning signs that should prompt in person medical eval

## 2022-07-20 NOTE — PROGRESS NOTES
7/20/2022    Trung Winn 1965 is a 60y.o. year old male established patient,   here for video visit to evaluate the following chief complaint(s):  Chief Complaint   Patient presents with    Positive For Covid-19     07/19, SORE THROAT, CONGESTION, BODY ACHES, HEADACHE           ASSESSMENT/PLAN:  Below is the assessment and plan developed based on review of pertinent history, physical exam, labs, studies, and medications. 1. COVID-19  -     nirmatrelvir-ritonavir (PAXLOVID) 150 mg x 2- 100 mg tablet; Nirmatrelvir 300mg with ritonavir 100mg twice daily for 5 days, Normal, Disp-1 Box, R-0  -     beclomethasone (QVAR) 40 mcg/actuation aero; Take 1 Puff by inhalation two (2) times a day., Normal, Disp-8.7 g, R-0Or flovent 110 if formulary preferred  2. Intrinsic asthma  Assessment & Plan:  Typically has rare symptoms and doesn't use maintenance inhaler but now in the setting of COVID and feeling more short of breath I recommend starting steroid inhaler BID + albuterol PRN  Also recommend getting pulse ox and monitoring at home, discussed warning signs that should prompt in person medical eval  Orders:  -     albuterol (PROVENTIL HFA, VENTOLIN HFA, PROAIR HFA) 90 mcg/actuation inhaler; Take 1 Puff by inhalation every six (6) hours as needed for Wheezing., Normal, Disp-18 g, R-1  -     beclomethasone (QVAR) 40 mcg/actuation aero; Take 1 Puff by inhalation two (2) times a day., Normal, Disp-8.7 g, R-0Or flovent 110 if formulary preferred  3. Controlled type 2 diabetes mellitus without complication, without long-term current use of insulin (HCC)  Assessment & Plan:  BG has been under control, continue home monitoring     Patient with positive COVID test and mild-moderate URI symptoms  Discussed pros and cons of specific therapy such as Paxlovid, patient elects to proceed, New med dosing and potential side effects discussed.  Advised to hold atorvastatin while on therapy  Isolation guidelines per CDC were discussed  We discussed symptomatic home care regimen including nasal saline lavage, nasal steroid spray, Mucinex products  Advised on monitoring vitals at home and discussed warning signs for worsening symptoms that should prompt urgent medical attention  Patient encouraged to contact me with any further concerns, questions, or failure to improve      Follow-up and Dispositions    Return if symptoms worsen or fail to improve. SUBJECTIVE/OBJECTIVE:  Patient reports he has been experiencing congestion, sore throat, phlegm in the throat, muscle aches, chills x 2 days. Feels more short of breath than usual, he does have a history of asthma but doesn't usually use an inhaler  He went to urgent care and had positive COVID testing yesterday  Has been taking BC powder at home for symptoms         Review of Systems   Constitutional:  Positive for chills and malaise/fatigue. Negative for fever. HENT:  Positive for congestion and sore throat. Respiratory:  Positive for cough, sputum production and shortness of breath. Cardiovascular:  Negative for chest pain, palpitations and leg swelling. Gastrointestinal:  Negative for abdominal pain. Skin:  Negative for rash.         Constitutional: [x] Appears well-developed and well-nourished [x] No apparent distress      [] Abnormal -     Mental status: [x] Alert and awake  [x] Oriented to person/place/time [x] Able to follow commands    [] Abnormal -     Eyes:   EOM    [x]  Normal    [] Abnormal -   Sclera  [x]  Normal    [] Abnormal -          Discharge [x]  None visible   [] Abnormal -     HENT: [x] Normocephalic, atraumatic  [] Abnormal -   [x] Mouth/Throat: Mucous membranes are moist    External Ears [x] Normal  [] Abnormal -    Neck: [x] No visualized mass [] Abnormal -     Pulmonary/Chest: [x] Respiratory effort normal   [x] No visualized signs of difficulty breathing or respiratory distress        [] Abnormal -        Neurological:        [x] No Facial Asymmetry (Cranial nerve 7 motor function) (limited exam due to video visit)          [x] No gaze palsy        [] Abnormal -          Skin:        [x] No significant exanthematous lesions or discoloration noted on facial skin         [] Abnormal -            Psychiatric:       [x] Normal Affect [] Abnormal -              No flowsheet data found. The following sections were reviewed & updated as appropriate: Problem List, Allergies, PMH, PSH, FH, and SH. Patient Active Problem List   Diagnosis Code    Intrinsic asthma J45.909    Onychomycosis B35.1    Unspecified vitamin D deficiency E55.9    Cervical disc disorder M50.90    Osteoarthrosis, unspecified whether generalized or localized, lower leg [id number]    Sleep apnea G47.30    Encounter for long-term (current) use of other medications Z79.899    Stasis edema of right lower extremity I87.301    Essential hypertension, benign I10    Mixed hyperlipidemia E78.2    MMT (medial meniscus tear) S83.249A    Generalized osteoarthritis M15.9    Post-traumatic osteoarthritis of both knees M17.2    Primary osteoarthritis of both hands M19.041, M19.042    Long term current use of non-steroidal anti-inflammatories (NSAID) Z79.1    Obesity, morbid (HCC) E66.01    Controlled type 2 diabetes mellitus without complication (HCC) W37.9        Current Outpatient Medications   Medication Sig Dispense Refill    albuterol (PROVENTIL HFA, VENTOLIN HFA, PROAIR HFA) 90 mcg/actuation inhaler Take 1 Puff by inhalation every six (6) hours as needed for Wheezing. 18 g 1    nirmatrelvir-ritonavir (PAXLOVID) 150 mg x 2- 100 mg tablet Nirmatrelvir 300mg with ritonavir 100mg twice daily for 5 days 1 Box 0    beclomethasone (QVAR) 40 mcg/actuation aero Take 1 Puff by inhalation two (2) times a day.  8.7 g 0    losartan-hydroCHLOROthiazide (HYZAAR) 50-12.5 mg per tablet TAKE ONE TABLET BY MOUTH DAILY 30 Tablet 4    dulaglutide (Trulicity) 3 ND/1.5 mL pnij 3 mg by SubCUTAneous route every seven (7) days. (Patient taking differently: 5 mg by SubCUTAneous route every seven (7) days. ) 4 Each 1    atorvastatin (LIPITOR) 40 mg tablet TAKE ONE TABLET BY MOUTH DAILY 90 Tablet 1    sertraline (ZOLOFT) 100 mg tablet TAKE ONE TABLET BY MOUTH DAILY (Patient taking differently: Take 100 mg in the morning.) 30 Tablet 3    glucose blood VI test strips (Accu-Chek Lianet Plus test strp) strip Check blood sugars three times daily. Dx E11.65. 300 Strip 3    acetaminophen (TYLENOL) 500 mg tablet Take 500 mg by mouth every six (6) hours as needed. ibuprofen (MOTRIN) 600 mg tablet Take 1 Tab by mouth every six (6) hours as needed for Pain. 100 Tab 0    ACCU-CHEK LIANET PLUS METER misc USE AS DIRECTED  TO  CHECK  BLOOD  SUGAR THREE TIMES DAILY (Patient taking differently: Check blood sugars bid.) 1 Each 0    lancets (ACCU-CHEK SOFTCLIX LANCETS) misc CHECK BLOOD SUGARS THREE TIMES DAILY 300 Each 3    Blood Glucose Control, Normal (ACCU-CHEK SMARTVIEW CONTRL SOL) soln USE AS DIRECTED 1 mL 1    alcohol swabs (BD SINGLE USE SWABS REGULAR) padm Check blood sugars three times daily. Dx E11.65. 100 Pad 11    fluticasone (FLONASE) 50 mcg/actuation nasal spray 2 Sprays by Both Nostrils route daily. (Patient taking differently: 2 Sprays by Both Nostrils route daily as needed.) 1 Bottle 11    aspirin 81 mg chewable tablet Take 1 Tab by mouth daily. 30 Tab 0       Egg, Cocoa, and Nitroglycerin    Social History     Occupational History    Not on file   Tobacco Use    Smoking status: Never    Smokeless tobacco: Never   Vaping Use    Vaping Use: Never used   Substance and Sexual Activity    Alcohol use: No     Comment: RARE    Drug use: No    Sexual activity: Yes     Partners: Female                Time spent on telemed visit: 20 min    The patient was evaluated through a synchronous (real-time) audio-video encounter. The patient (or guardian if applicable) is aware that this is a billable service, which includes applicable co-pays. Verbal consent to proceed has been obtained. The visit was conducted pursuant to the emergency declaration under the [facility][note 3][facility], [address] [place] [address] authority and the [employer] Act. Patient identification was verified, and a caregiver was present when appropriate. The patient was located at home in a state where the provider was licensed to provide care. Disclaimer:  Aspects of this note may have been generated using Dragon voice recognition software. Despite editing, there may be some syntax errors   We discussed the expected course, resolution and complications of the diagnosis(es) in detail. I have discussed any significant medication side effects and warnings with the patient when indicated. I advised them to contact the office if their condition worsens, changes or fails to improve as anticipated. Patient expressed understanding of the diagnosis and plan. An electronic signature was used to authenticate this note.   -- Sherrine Claude, MD

## 2022-07-30 ENCOUNTER — OFFICE VISIT (OUTPATIENT)
Dept: URGENT CARE | Age: 57
End: 2022-07-30
Payer: MEDICARE

## 2022-07-30 VITALS — OXYGEN SATURATION: 95 % | HEART RATE: 91 BPM | RESPIRATION RATE: 18 BRPM | TEMPERATURE: 98.4 F

## 2022-07-30 DIAGNOSIS — Z20.822 ENCOUNTER FOR LABORATORY TESTING FOR COVID-19 VIRUS: Primary | ICD-10-CM

## 2022-07-30 LAB — SARS-COV-2 PCR, POC: POSITIVE

## 2022-07-30 PROCEDURE — 99211 OFF/OP EST MAY X REQ PHY/QHP: CPT | Performed by: INTERNAL MEDICINE

## 2022-07-30 PROCEDURE — 87635 SARS-COV-2 COVID-19 AMP PRB: CPT | Performed by: INTERNAL MEDICINE

## 2022-07-30 NOTE — PROGRESS NOTES
Telephone call to patient, patient ID verified. Advised patient of positive COVID 19 test result. He verbalizes understanding, he had tested positive several weeks ago and is just trying to achieve a negative test result before being around people at [facility]. He feels well and has no questions at this time.

## 2022-08-06 ENCOUNTER — OFFICE VISIT (OUTPATIENT)
Dept: URGENT CARE | Age: 57
End: 2022-08-06
Payer: MEDICARE

## 2022-08-06 VITALS
HEART RATE: 76 BPM | DIASTOLIC BLOOD PRESSURE: 96 MMHG | RESPIRATION RATE: 18 BRPM | TEMPERATURE: 97.7 F | WEIGHT: 259 LBS | SYSTOLIC BLOOD PRESSURE: 139 MMHG | OXYGEN SATURATION: 97 % | BODY MASS INDEX: 37.16 KG/M2

## 2022-08-06 DIAGNOSIS — Z20.822 ENCOUNTER FOR LABORATORY TESTING FOR COVID-19 VIRUS: ICD-10-CM

## 2022-08-06 DIAGNOSIS — U07.1 COVID-19: Primary | ICD-10-CM

## 2022-08-06 LAB — SARS-COV-2 PCR, POC: POSITIVE

## 2022-08-06 PROCEDURE — G8427 DOCREV CUR MEDS BY ELIG CLIN: HCPCS | Performed by: NURSE PRACTITIONER

## 2022-08-06 PROCEDURE — 87635 SARS-COV-2 COVID-19 AMP PRB: CPT | Performed by: NURSE PRACTITIONER

## 2022-08-06 PROCEDURE — G8432 DEP SCR NOT DOC, RNG: HCPCS | Performed by: NURSE PRACTITIONER

## 2022-08-06 PROCEDURE — G8755 DIAS BP > OR = 90: HCPCS | Performed by: NURSE PRACTITIONER

## 2022-08-06 PROCEDURE — 99213 OFFICE O/P EST LOW 20 MIN: CPT | Performed by: NURSE PRACTITIONER

## 2022-08-06 PROCEDURE — 3017F COLORECTAL CA SCREEN DOC REV: CPT | Performed by: NURSE PRACTITIONER

## 2022-08-06 PROCEDURE — G8752 SYS BP LESS 140: HCPCS | Performed by: NURSE PRACTITIONER

## 2022-08-06 PROCEDURE — G8417 CALC BMI ABV UP PARAM F/U: HCPCS | Performed by: NURSE PRACTITIONER

## 2022-08-06 RX ORDER — AMOXICILLIN AND CLAVULANATE POTASSIUM 875; 125 MG/1; MG/1
1 TABLET, FILM COATED ORAL EVERY 12 HOURS
Qty: 14 TABLET | Refills: 0 | Status: SHIPPED | OUTPATIENT
Start: 2022-08-06 | End: 2022-08-13

## 2022-08-06 RX ORDER — FLUTICASONE PROPIONATE 50 MCG
2 SPRAY, SUSPENSION (ML) NASAL DAILY
Qty: 1 EACH | Refills: 0 | Status: SHIPPED | OUTPATIENT
Start: 2022-08-06 | End: 2022-08-13

## 2022-08-06 NOTE — PATIENT INSTRUCTIONS
Follow up with your primary care provider this week if symptoms persist.  Go to the Emergency Department with development of any acute symptoms.
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
## 2022-08-12 ENCOUNTER — OFFICE VISIT (OUTPATIENT)
Dept: URGENT CARE | Age: 57
End: 2022-08-12
Payer: MEDICARE

## 2022-08-12 VITALS — OXYGEN SATURATION: 98 % | HEART RATE: 106 BPM | RESPIRATION RATE: 18 BRPM | TEMPERATURE: 98.3 F

## 2022-08-12 DIAGNOSIS — Z20.822 ENCOUNTER FOR LABORATORY TESTING FOR COVID-19 VIRUS: Primary | ICD-10-CM

## 2022-08-12 LAB — SARS-COV-2 PCR, POC: NEGATIVE

## 2022-08-12 PROCEDURE — 99211 OFF/OP EST MAY X REQ PHY/QHP: CPT | Performed by: NURSE PRACTITIONER

## 2022-08-12 PROCEDURE — 87635 SARS-COV-2 COVID-19 AMP PRB: CPT | Performed by: NURSE PRACTITIONER

## 2022-08-19 NOTE — TELEPHONE ENCOUNTER
[phone]      The patient needs a letter to be able to start  Yoga for PTSD
Advised pt letter ready to . he will  tomorrow.
Spoke with pt - he states he needs a letter stating he is physically capable to participate in yoga for PTSD.  Will forward to MD.
Initial (On Arrival)

## 2022-08-26 ENCOUNTER — VIRTUAL VISIT (OUTPATIENT)
Dept: INTERNAL MEDICINE CLINIC | Age: 57
End: 2022-08-26
Payer: MEDICARE

## 2022-08-26 DIAGNOSIS — M51.9 LUMBAR DISC DISEASE: ICD-10-CM

## 2022-08-26 DIAGNOSIS — E78.2 MIXED HYPERLIPIDEMIA: ICD-10-CM

## 2022-08-26 DIAGNOSIS — E11.65 UNCONTROLLED TYPE 2 DIABETES MELLITUS WITH HYPERGLYCEMIA (HCC): Primary | ICD-10-CM

## 2022-08-26 DIAGNOSIS — E66.01 SEVERE OBESITY (BMI 35.0-39.9) WITH COMORBIDITY (HCC): ICD-10-CM

## 2022-08-26 DIAGNOSIS — I10 ESSENTIAL HYPERTENSION, BENIGN: ICD-10-CM

## 2022-08-26 PROCEDURE — 3017F COLORECTAL CA SCREEN DOC REV: CPT | Performed by: INTERNAL MEDICINE

## 2022-08-26 PROCEDURE — 99214 OFFICE O/P EST MOD 30 MIN: CPT | Performed by: INTERNAL MEDICINE

## 2022-08-26 PROCEDURE — G8756 NO BP MEASURE DOC: HCPCS | Performed by: INTERNAL MEDICINE

## 2022-08-26 PROCEDURE — 3046F HEMOGLOBIN A1C LEVEL >9.0%: CPT | Performed by: INTERNAL MEDICINE

## 2022-08-26 PROCEDURE — G8427 DOCREV CUR MEDS BY ELIG CLIN: HCPCS | Performed by: INTERNAL MEDICINE

## 2022-08-26 PROCEDURE — 2022F DILAT RTA XM EVC RTNOPTHY: CPT | Performed by: INTERNAL MEDICINE

## 2022-08-26 PROCEDURE — G8432 DEP SCR NOT DOC, RNG: HCPCS | Performed by: INTERNAL MEDICINE

## 2022-08-26 NOTE — PROGRESS NOTES
HISTORY OF PRESENT ILLNESS  This is a real-time audio/video visit brought to you by our sponsors, the fine folks at [facility]. ASSESSMENT    Dictation on: 08/26/2022  9:28 AM by: Gideon Gould [[id number]]     Booker Kanner is a 60 y.o. male. Diabetes  Associated symptoms include shortness of breath. Pertinent negatives include no chest pain. Review of Systems   Constitutional:  Negative for chills, fever and weight loss. Respiratory:  Positive for shortness of breath. Negative for cough. Post covid   Cardiovascular:  Negative for chest pain, palpitations, leg swelling and PND. Musculoskeletal:  Negative for myalgias. Neurological:  Negative for focal weakness. Physical Exam  Vitals and nursing note reviewed. Constitutional:       Appearance: Normal appearance. He is not ill-appearing. Pulmonary:      Effort: Pulmonary effort is normal. No respiratory distress. Skin:     General: Skin is warm and dry. Neurological:      Mental Status: He is alert. Psychiatric:         Behavior: Behavior normal.       ASSESSMENT and PLAN  Diagnoses and all orders for this visit:    1. Uncontrolled type 2 diabetes mellitus with hyperglycemia ([address]    2. Mixed hyperlipidemia    3. Lumbar disc disease    4. Severe obesity (BMI 35.0-39. 9) with comorbidity ([address]    5.  Essential hypertension, benign

## 2022-08-30 NOTE — PROGRESS NOTES
Maria De Jesus Escobar is seen today for followup of diabetes, hyperlipidemia, and other problems. 1. Diabetes, hyperlipidemia. Overdue for labs. He did not do these earlier in the year as ordered. Of note, his weight is stable, but he would like to lose more. We will determine A1c and I strongly encouraged him to do his lab work in the very near future and increase in Trulicity dosing may be an order. 2. Hyperlipidemia. Check labs as noted above. 3. Hypertension. He has had no assessments. We will wait until his in-person visit with his new PCP in November. 4. Depression. Stable on current regimen. 5. Post COVID symptoms. Notes some mild shortness of breath. We will purchase a pulse oximeter. If he is less than 95% then we will schedule a visit. Otherwise, I think his symptoms will gradually improve and he was reassured. He appears completely comfortable on the virtual visit today.

## 2022-10-31 ENCOUNTER — TELEPHONE (OUTPATIENT)
Dept: INTERNAL MEDICINE CLINIC | Age: 57
End: 2022-10-31

## 2022-10-31 ENCOUNTER — PATIENT MESSAGE (OUTPATIENT)
Dept: INTERNAL MEDICINE CLINIC | Age: 57
End: 2022-10-31

## 2022-10-31 NOTE — TELEPHONE ENCOUNTER
Pharmacy Progress Note     Sent pt MyChart message stating she was due for Rx assistance re-enrollment. Offered to set up an appt in order to complete paperwork.       Anita Kwok, PharmD, BCGP, BCACP  Clinical Pharmacist Specialist      For Pharmacy Admin Tracking Only    CPA in place: Yes  Recommendation Provided To: Patient/Caregiver: 0 via [address] By: Patient/Caregiver: 0  Time Spent (min): 5

## 2022-10-31 NOTE — TELEPHONE ENCOUNTER
Pharmacy Progress Note - Telephone Call    Mr. Edil Noland 59 y.o. was contacted via an outbound telephone call regarding scheduling Rx assistance re-enrollment today. A voicemail was left for patient to return my call. This writer's work mobile number was provided as a callback contact - [phone].     Will send too.me message      Nica Kelly, PharmD, BCGP, BCACP  Clinical Pharmacist Specialist        For Pharmacy Admin Tracking Only    CPA in place: Yes  Recommendation Provided To: Patient/Caregiver: 0 via Telephone  Intervention Accepted By: Patient/Caregiver: 0  Time Spent (min): 5

## 2023-01-02 ENCOUNTER — OFFICE VISIT (OUTPATIENT)
Dept: URGENT CARE | Age: 58
End: 2023-01-02
Payer: MEDICARE

## 2023-01-02 VITALS
OXYGEN SATURATION: 99 % | DIASTOLIC BLOOD PRESSURE: 85 MMHG | WEIGHT: 257 LBS | SYSTOLIC BLOOD PRESSURE: 145 MMHG | BODY MASS INDEX: 36.88 KG/M2 | RESPIRATION RATE: 20 BRPM | TEMPERATURE: 98.4 F | HEART RATE: 79 BPM

## 2023-01-02 DIAGNOSIS — R05.1 ACUTE COUGH: ICD-10-CM

## 2023-01-02 DIAGNOSIS — J01.40 ACUTE NON-RECURRENT PANSINUSITIS: Primary | ICD-10-CM

## 2023-01-02 PROCEDURE — 99213 OFFICE O/P EST LOW 20 MIN: CPT | Performed by: FAMILY MEDICINE

## 2023-01-02 PROCEDURE — G8427 DOCREV CUR MEDS BY ELIG CLIN: HCPCS | Performed by: FAMILY MEDICINE

## 2023-01-02 PROCEDURE — 3079F DIAST BP 80-89 MM HG: CPT | Performed by: FAMILY MEDICINE

## 2023-01-02 PROCEDURE — 3017F COLORECTAL CA SCREEN DOC REV: CPT | Performed by: FAMILY MEDICINE

## 2023-01-02 PROCEDURE — 3077F SYST BP >= 140 MM HG: CPT | Performed by: FAMILY MEDICINE

## 2023-01-02 PROCEDURE — G8417 CALC BMI ABV UP PARAM F/U: HCPCS | Performed by: FAMILY MEDICINE

## 2023-01-02 PROCEDURE — G8432 DEP SCR NOT DOC, RNG: HCPCS | Performed by: FAMILY MEDICINE

## 2023-01-02 RX ORDER — BENZONATATE 200 MG/1
200 CAPSULE ORAL
Qty: 30 CAPSULE | Refills: 0 | Status: SHIPPED | OUTPATIENT
Start: 2023-01-02

## 2023-01-02 RX ORDER — DOXYCYCLINE 100 MG/1
100 TABLET ORAL 2 TIMES DAILY
Qty: 20 TABLET | Refills: 0 | Status: SHIPPED | OUTPATIENT
Start: 2023-01-02 | End: 2023-01-12
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
## 2023-02-03 ENCOUNTER — OFFICE VISIT (OUTPATIENT)
Dept: URGENT CARE | Age: 58
End: 2023-02-03
Payer: MEDICARE

## 2023-02-03 VITALS — RESPIRATION RATE: 18 BRPM | OXYGEN SATURATION: 100 % | TEMPERATURE: 99.4 F | HEART RATE: 82 BPM

## 2023-02-03 DIAGNOSIS — Z20.822 ENCOUNTER FOR SCREENING LABORATORY TESTING FOR COVID-19 VIRUS: Primary | ICD-10-CM

## 2023-02-03 LAB — SARS-COV-2 PCR, POC: NEGATIVE

## 2023-03-02 ENCOUNTER — TELEPHONE (OUTPATIENT)
Dept: INTERNAL MEDICINE CLINIC | Age: 58
End: 2023-03-02

## 2023-03-02 NOTE — TELEPHONE ENCOUNTER
Reason for call:    Patient called, returning Nanda's phone call.      Is this a new problem: yes      Date of last appointment:  8/26/2022      Can we respond via [employer]: no     Best call back number:      Shelda Fujsaharawa - [phone]

## 2023-03-02 NOTE — TELEPHONE ENCOUNTER
Reason for call:    Patient called, returning Nanda's phone call.     Is this a new problem: yes     Date of last appointment:  8/26/2022     Can we respond via [employer]: no    Best call back number:     Mar Viktor - [phone]

## 2023-03-02 NOTE — TELEPHONE ENCOUNTER
Spoke with patient and notified of Dr. Barron Foreman note - he voiced understanding. He will have labs done fasting w/ LC prior to appointment.

## 2023-03-02 NOTE — TELEPHONE ENCOUNTER
Please let patient know that I received the ultrasound report from his ortho doctor and we can review that when he comes to see me soon.  Also remind him there were some standing orders for [facility] from Dr Ramin Ambriz if he wants to get those done prior to our visit

## 2023-03-13 ENCOUNTER — TELEPHONE (OUTPATIENT)
Dept: INTERNAL MEDICINE CLINIC | Age: 58
End: 2023-03-13

## 2023-03-13 PROBLEM — E11.65 UNCONTROLLED TYPE 2 DIABETES MELLITUS WITH HYPERGLYCEMIA (HCC): Status: ACTIVE | Noted: 2023-03-13

## 2023-04-10 PROBLEM — N28.89 RIGHT KIDNEY MASS: Status: ACTIVE | Noted: 2023-04-10

## 2023-04-10 PROBLEM — E66.01 SEVERE OBESITY (BMI 35.0-39.9) WITH COMORBIDITY (HCC): Status: ACTIVE | Noted: 2018-03-26

## 2023-04-21 ENCOUNTER — TELEPHONE (OUTPATIENT)
Dept: INTERNAL MEDICINE CLINIC | Age: 58
End: 2023-04-21

## 2023-04-21 NOTE — TELEPHONE ENCOUNTER
Pls advise for them to call our  @ [phone] to get this scheduled. If there are any issues please let us know. Thank you.

## 2023-04-21 NOTE — TELEPHONE ENCOUNTER
----- Message from Rosetta Marquez sent at 4/21/2023  1:29 PM EDT -----  Subject: Referral Request    Reason for referral request? Patient wife called and patient was wondering   about the referral that the provider wanted him to get a MRI on stomach do   to a stop on kidney. Patient stated they have not heard from anyone. Please call  Provider patient wants to be referred to(if known):     Provider Phone Number(if known):     Additional Information for Provider?   ---------------------------------------------------------------------------  --------------  [id number]    [phone]; OK to leave message on voicemail  ---------------------------------------------------------------------------  --------------

## 2023-04-27 ENCOUNTER — HOSPITAL ENCOUNTER (OUTPATIENT)
Dept: MRI IMAGING | Age: 58
Discharge: HOME | End: 2023-04-27
Attending: INTERNAL MEDICINE

## 2023-04-27 VITALS — BODY MASS INDEX: 35.44 KG/M2 | WEIGHT: 247 LBS

## 2023-04-27 DIAGNOSIS — N28.89 RIGHT KIDNEY MASS: ICD-10-CM

## 2023-04-27 RX ORDER — GADOTERATE MEGLUMINE 376.9 MG/ML
20 INJECTION INTRAVENOUS ONCE
Status: DISCONTINUED | OUTPATIENT
Start: 2023-04-27 | End: 2023-04-28 | Stop reason: HOSPADM

## 2023-05-23 ENCOUNTER — HOSPITAL ENCOUNTER (OUTPATIENT)
Facility: HOSPITAL | Age: 58
Discharge: HOME | End: 2023-05-26
Payer: COMMERCIAL

## 2023-05-23 DIAGNOSIS — N28.89 KIDNEY MASS: ICD-10-CM

## 2023-05-23 PROCEDURE — 6360000004 HC RX CONTRAST MEDICATION

## 2023-05-23 PROCEDURE — A9579 GAD-BASE MR CONTRAST NOS,1ML: HCPCS

## 2023-05-23 PROCEDURE — 74183 MRI ABD W/O CNTR FLWD CNTR: CPT

## 2023-05-23 RX ORDER — 0.9 % SODIUM CHLORIDE 0.9 %
100 INTRAVENOUS SOLUTION INTRAVENOUS ONCE
Status: DISCONTINUED | OUTPATIENT
Start: 2023-05-23 | End: 2023-05-27 | Stop reason: HOSPADM

## 2023-05-23 RX ORDER — SODIUM CHLORIDE 0.9 % (FLUSH) 0.9 %
10 SYRINGE (ML) INJECTION ONCE
Status: DISCONTINUED | OUTPATIENT
Start: 2023-05-23 | End: 2023-05-27 | Stop reason: HOSPADM

## 2023-05-23 RX ADMIN — GADOTERIDOL 20 ML: 279.3 INJECTION, SOLUTION INTRAVENOUS at 11:15

## 2023-06-19 ENCOUNTER — TELEPHONE (OUTPATIENT)
Age: 58
End: 2023-06-19

## 2023-06-19 DIAGNOSIS — E11.65 UNCONTROLLED TYPE 2 DIABETES MELLITUS WITH HYPERGLYCEMIA (HCC): Primary | ICD-10-CM

## 2023-06-19 NOTE — TELEPHONE ENCOUNTER
Reason for call:  Spoke with pt's wife.  They need a refill for     Dulaglutide (TRULICITY) 3 LR/1.2 SOPN    Send to   [facility] [place], [address] [facility]  [phone]    Pt do not have any more med     Is this a new problem: Yes    Date of last appointment:  4/10/2023     Can we respond via [employer]: No    Best call back number: Anshul Ramirez" (Self)  [phone]

## 2023-06-19 NOTE — TELEPHONE ENCOUNTER
Informed patient was under Patient Assistance. Was notified via [address] by [employer], due for re-enrollment. Advised can refer to pharmacist for assistance in enrollment process. Patient in agreement.

## 2023-07-13 ENCOUNTER — OFFICE VISIT (OUTPATIENT)
Age: 58
End: 2023-07-13
Payer: MEDICARE

## 2023-07-13 VITALS
HEIGHT: 70 IN | HEART RATE: 96 BPM | OXYGEN SATURATION: 98 % | WEIGHT: 247 LBS | BODY MASS INDEX: 35.36 KG/M2 | DIASTOLIC BLOOD PRESSURE: 92 MMHG | RESPIRATION RATE: 18 BRPM | TEMPERATURE: 97.3 F | SYSTOLIC BLOOD PRESSURE: 142 MMHG

## 2023-07-13 DIAGNOSIS — E66.01 SEVERE OBESITY (BMI 35.0-39.9) WITH COMORBIDITY (HCC): ICD-10-CM

## 2023-07-13 DIAGNOSIS — E78.2 MIXED HYPERLIPIDEMIA: ICD-10-CM

## 2023-07-13 DIAGNOSIS — I10 ESSENTIAL HYPERTENSION, BENIGN: Primary | ICD-10-CM

## 2023-07-13 DIAGNOSIS — E11.65 UNCONTROLLED TYPE 2 DIABETES MELLITUS WITH HYPERGLYCEMIA (HCC): ICD-10-CM

## 2023-07-13 DIAGNOSIS — N28.89 RIGHT KIDNEY MASS: ICD-10-CM

## 2023-07-13 DIAGNOSIS — R93.2 ABNORMAL LIVER DIAGNOSTIC IMAGING: ICD-10-CM

## 2023-07-13 DIAGNOSIS — R00.2 PALPITATIONS: ICD-10-CM

## 2023-07-13 DIAGNOSIS — Z12.5 SCREENING PSA (PROSTATE SPECIFIC ANTIGEN): ICD-10-CM

## 2023-07-13 DIAGNOSIS — I10 ESSENTIAL HYPERTENSION, BENIGN: ICD-10-CM

## 2023-07-13 PROCEDURE — 3046F HEMOGLOBIN A1C LEVEL >9.0%: CPT | Performed by: INTERNAL MEDICINE

## 2023-07-13 PROCEDURE — 3017F COLORECTAL CA SCREEN DOC REV: CPT | Performed by: INTERNAL MEDICINE

## 2023-07-13 PROCEDURE — 3080F DIAST BP >= 90 MM HG: CPT | Performed by: INTERNAL MEDICINE

## 2023-07-13 PROCEDURE — G8427 DOCREV CUR MEDS BY ELIG CLIN: HCPCS | Performed by: INTERNAL MEDICINE

## 2023-07-13 PROCEDURE — 99214 OFFICE O/P EST MOD 30 MIN: CPT | Performed by: INTERNAL MEDICINE

## 2023-07-13 PROCEDURE — 3077F SYST BP >= 140 MM HG: CPT | Performed by: INTERNAL MEDICINE

## 2023-07-13 PROCEDURE — 1036F TOBACCO NON-USER: CPT | Performed by: INTERNAL MEDICINE

## 2023-07-13 PROCEDURE — 2022F DILAT RTA XM EVC RTNOPTHY: CPT | Performed by: INTERNAL MEDICINE

## 2023-07-13 PROCEDURE — G8419 CALC BMI OUT NRM PARAM NOF/U: HCPCS | Performed by: INTERNAL MEDICINE

## 2023-07-13 RX ORDER — LOSARTAN POTASSIUM AND HYDROCHLOROTHIAZIDE 25; 100 MG/1; MG/1
1 TABLET ORAL DAILY
Qty: 90 TABLET | Refills: 1 | Status: SHIPPED | OUTPATIENT
Start: 2023-07-13

## 2023-07-13 SDOH — ECONOMIC STABILITY: HOUSING INSECURITY
IN THE LAST 12 MONTHS, WAS THERE A TIME WHEN YOU DID NOT HAVE A STEADY PLACE TO SLEEP OR SLEPT IN A SHELTER (INCLUDING NOW)?: NO

## 2023-07-13 SDOH — ECONOMIC STABILITY: INCOME INSECURITY: HOW HARD IS IT FOR YOU TO PAY FOR THE VERY BASICS LIKE FOOD, HOUSING, MEDICAL CARE, AND HEATING?: NOT HARD AT ALL

## 2023-07-13 SDOH — ECONOMIC STABILITY: FOOD INSECURITY: WITHIN THE PAST 12 MONTHS, THE FOOD YOU BOUGHT JUST DIDN'T LAST AND YOU DIDN'T HAVE MONEY TO GET MORE.: NEVER TRUE

## 2023-07-13 SDOH — ECONOMIC STABILITY: FOOD INSECURITY: WITHIN THE PAST 12 MONTHS, YOU WORRIED THAT YOUR FOOD WOULD RUN OUT BEFORE YOU GOT MONEY TO BUY MORE.: NEVER TRUE

## 2023-07-13 ASSESSMENT — ENCOUNTER SYMPTOMS
ABDOMINAL PAIN: 0
COUGH: 0
SHORTNESS OF BREATH: 0

## 2023-07-13 NOTE — ASSESSMENT & PLAN NOTE
Overdue for lab work, will go today to help guide further management  He is tolerating the trulicity well and has been losing weight

## 2023-07-13 NOTE — PROGRESS NOTES
7/13/2023    Meera Simpson 1965 is a 59y.o. year old male established patient,   here for evaluation of the following chief complaint(s):  Chief Complaint   Patient presents with    Follow-up    Diabetes           ASSESSMENT/PLAN:  Below is the assessment and plan developed based on review of pertinent history, physical exam, labs, studies, and medications. 1. Essential hypertension, benign  Assessment & Plan:   Above goal, will uptitrate dosage  Advised on home monitoring of BP and keep a log    Orders:  -     CBC with Auto Differential; Future  -     Comprehensive Metabolic Panel; Future  -     Microalbumin / Creatinine Urine Ratio; Future  -     losartan-hydroCHLOROthiazide (HYZAAR) 100-25 MG per tablet; Take 1 tablet by mouth daily, Disp-90 tablet, R-1Normal  2. Uncontrolled type 2 diabetes mellitus with hyperglycemia ([address])  Assessment & Plan:  Overdue for lab work, will go today to help guide further management  He is tolerating the trulicity well and has been losing weight  Orders:  -     CBC with Auto Differential; Future  -     Comprehensive Metabolic Panel; Future  -     Hemoglobin A1C; Future  3. Mixed hyperlipidemia  Assessment & Plan:  Lab Results   Component Value Date    LDLCALC 90.6 04/21/2021        Tolerating statin therapy, plan to continue current regimen pending lab results  Recheck labs to assess for response to medication, whether LDL is at target, and monitor for side effects    Orders:  -     CBC with Auto Differential; Future  -     Comprehensive Metabolic Panel; Future  -     LDL Cholesterol, Direct; Future  4. Palpitations  -     Cardiac event monitor; Future  5. Screening PSA (prostate specific antigen)  -     PSA Screening; Future  6. Severe obesity (BMI 35.0-39. 9) with comorbidity [facility])  Assessment & Plan:  Weight downtrending  Continue trulicity and healthy diet and exercise habits  Wt Readings from Last 3 Encounters:   07/13/23 247 lb (112 kg)   04/27/23 247 lb (112 kg)

## 2023-07-13 NOTE — ASSESSMENT & PLAN NOTE
Weight downtrending  Continue trulicity and healthy diet and exercise habits  Wt Readings from Last 3 Encounters:   07/13/23 247 lb (112 kg)   04/27/23 247 lb (112 kg)   04/10/23 261 lb 3.2 oz (118.5 kg)

## 2023-07-13 NOTE — ASSESSMENT & PLAN NOTE
Lab Results   Component Value Date    LDLCALC 90.6 04/21/2021        Tolerating statin therapy, plan to continue current regimen pending lab results  Recheck labs to assess for response to medication, whether LDL is at target, and monitor for side effects

## 2023-07-14 ENCOUNTER — TELEPHONE (OUTPATIENT)
Age: 58
End: 2023-07-14

## 2023-07-14 NOTE — TELEPHONE ENCOUNTER
----- Message from Amy Peck LPN sent at 5/03/6966  1:52 PM EDT -----  Regarding: Event monitor  Good afternoon,    Please assist pt in setting up an event monitor. Mandi Murphy has placed the order.      Thanks,  Amy Peck LPN

## 2023-07-15 LAB
ALBUMIN SERPL-MCNC: 4.4 G/DL (ref 3.8–4.9)
ALBUMIN/CREAT UR: 9 MG/G CREAT (ref 0–29)
ALBUMIN/GLOB SERPL: 1.8 {RATIO} (ref 1.2–2.2)
ALP SERPL-CCNC: 100 IU/L (ref 44–121)
ALT SERPL-CCNC: 27 IU/L (ref 0–44)
AST SERPL-CCNC: 15 IU/L (ref 0–40)
BASOPHILS # BLD AUTO: 0.1 X10E3/UL (ref 0–0.2)
BASOPHILS NFR BLD AUTO: 1 %
BILIRUB SERPL-MCNC: 0.5 MG/DL (ref 0–1.2)
BUN SERPL-MCNC: 9 MG/DL (ref 6–24)
BUN/CREAT SERPL: 12 (ref 9–20)
CALCIUM SERPL-MCNC: 9.1 MG/DL (ref 8.7–10.2)
CHLORIDE SERPL-SCNC: 103 MMOL/L (ref 96–106)
CO2 SERPL-SCNC: 26 MMOL/L (ref 20–29)
CREAT SERPL-MCNC: 0.78 MG/DL (ref 0.76–1.27)
CREAT UR-MCNC: 208.8 MG/DL
EGFRCR SERPLBLD CKD-EPI 2021: 103 ML/MIN/1.73
EOSINOPHIL # BLD AUTO: 0.2 X10E3/UL (ref 0–0.4)
EOSINOPHIL NFR BLD AUTO: 3 %
ERYTHROCYTE [DISTWIDTH] IN BLOOD BY AUTOMATED COUNT: 12.7 % (ref 11.6–15.4)
GLOBULIN SER CALC-MCNC: 2.4 G/DL (ref 1.5–4.5)
GLUCOSE SERPL-MCNC: 119 MG/DL (ref 70–99)
HBA1C MFR BLD: 8.2 % (ref 4.8–5.6)
HCT VFR BLD AUTO: 42.4 % (ref 37.5–51)
HGB BLD-MCNC: 14.1 G/DL (ref 13–17.7)
IMM GRANULOCYTES # BLD AUTO: 0 X10E3/UL (ref 0–0.1)
IMM GRANULOCYTES NFR BLD AUTO: 0 %
LDLC SERPL DIRECT ASSAY-MCNC: 96 MG/DL (ref 0–99)
LYMPHOCYTES # BLD AUTO: 2.8 X10E3/UL (ref 0.7–3.1)
LYMPHOCYTES NFR BLD AUTO: 40 %
MCH RBC QN AUTO: 30.2 PG (ref 26.6–33)
MCHC RBC AUTO-ENTMCNC: 33.3 G/DL (ref 31.5–35.7)
MCV RBC AUTO: 91 FL (ref 79–97)
MICROALBUMIN UR-MCNC: 19 UG/ML
MONOCYTES # BLD AUTO: 0.4 X10E3/UL (ref 0.1–0.9)
MONOCYTES NFR BLD AUTO: 6 %
NEUTROPHILS # BLD AUTO: 3.5 X10E3/UL (ref 1.4–7)
NEUTROPHILS NFR BLD AUTO: 50 %
PLATELET # BLD AUTO: 381 X10E3/UL (ref 150–450)
POTASSIUM SERPL-SCNC: 4.2 MMOL/L (ref 3.5–5.2)
PROT SERPL-MCNC: 6.8 G/DL (ref 6–8.5)
PSA SERPL-MCNC: 0.4 NG/ML (ref 0–4)
RBC # BLD AUTO: 4.67 X10E6/UL (ref 4.14–5.8)
SODIUM SERPL-SCNC: 141 MMOL/L (ref 134–144)
WBC # BLD AUTO: 6.9 X10E3/UL (ref 3.4–10.8)

## 2023-07-20 ENCOUNTER — OFFICE VISIT (OUTPATIENT)
Age: 58
End: 2023-07-20

## 2023-07-20 VITALS
OXYGEN SATURATION: 97 % | DIASTOLIC BLOOD PRESSURE: 82 MMHG | WEIGHT: 247 LBS | BODY MASS INDEX: 35.44 KG/M2 | RESPIRATION RATE: 18 BRPM | HEART RATE: 87 BPM | TEMPERATURE: 97.6 F | SYSTOLIC BLOOD PRESSURE: 150 MMHG

## 2023-07-20 DIAGNOSIS — R05.1 ACUTE COUGH: Primary | ICD-10-CM

## 2023-07-20 DIAGNOSIS — J01.90 ACUTE NON-RECURRENT SINUSITIS, UNSPECIFIED LOCATION: ICD-10-CM

## 2023-07-20 LAB
Lab: NORMAL
QC PASS/FAIL: NORMAL
SARS-COV-2, POC: NORMAL

## 2023-07-20 RX ORDER — AMOXICILLIN AND CLAVULANATE POTASSIUM 875; 125 MG/1; MG/1
1 TABLET, FILM COATED ORAL 2 TIMES DAILY
Qty: 14 TABLET | Refills: 0 | Status: SHIPPED | OUTPATIENT
Start: 2023-07-20 | End: 2023-07-27

## 2023-07-20 ASSESSMENT — ENCOUNTER SYMPTOMS
CHEST TIGHTNESS: 0
COUGH: 1
SINUS PAIN: 1
HOARSE VOICE: 1
SINUS PRESSURE: 1
SINUS COMPLAINT: 1
SORE THROAT: 1

## 2023-07-20 NOTE — PATIENT INSTRUCTIONS
Results for orders placed or performed in visit on 07/20/23   POCT COVID-19, SARS-COV-2, PCR   Result Value Ref Range    SARS-COV-2, POC Not-Detected Not Detected    Lot Number      QC Pass/Fail pass      Mucinex Fast max 2 tab 4 times/ day - day/ night pack   Or Dayquil/Nyquil    Motrin as needed  Zyrtec/ Allegra daily   Saline sinus rinse

## 2023-08-07 ENCOUNTER — HOSPITAL ENCOUNTER (OUTPATIENT)
Facility: HOSPITAL | Age: 58
Setting detail: OBSERVATION
LOS: 1 days | Discharge: HOME | End: 2023-08-09
Attending: EMERGENCY MEDICINE | Admitting: STUDENT IN AN ORGANIZED HEALTH CARE EDUCATION/TRAINING PROGRAM
Payer: MEDICARE

## 2023-08-07 ENCOUNTER — APPOINTMENT (OUTPATIENT)
Facility: HOSPITAL | Age: 58
End: 2023-08-07
Payer: MEDICARE

## 2023-08-07 DIAGNOSIS — R00.2 PALPITATIONS: ICD-10-CM

## 2023-08-07 DIAGNOSIS — I47.20 VENTRICULAR TACHYCARDIA (HCC): Primary | ICD-10-CM

## 2023-08-07 DIAGNOSIS — R55 SYNCOPE AND COLLAPSE: ICD-10-CM

## 2023-08-07 PROBLEM — I24.9 ACUTE CORONARY SYNDROME (HCC): Status: ACTIVE | Noted: 2023-08-07

## 2023-08-07 LAB
ALBUMIN SERPL-MCNC: 3.9 G/DL (ref 3.5–5)
ALBUMIN/GLOB SERPL: 1.2 (ref 1.1–2.2)
ALP SERPL-CCNC: 97 U/L (ref 45–117)
ALT SERPL-CCNC: 38 U/L (ref 12–78)
ANION GAP SERPL CALC-SCNC: 3 MMOL/L (ref 5–15)
AST SERPL-CCNC: 16 U/L (ref 15–37)
BASOPHILS # BLD: 0.1 K/UL (ref 0–0.1)
BASOPHILS NFR BLD: 1 % (ref 0–1)
BILIRUB SERPL-MCNC: 0.3 MG/DL (ref 0.2–1)
BUN SERPL-MCNC: 14 MG/DL (ref 6–20)
BUN/CREAT SERPL: 17 (ref 12–20)
CALCIUM SERPL-MCNC: 9.3 MG/DL (ref 8.5–10.1)
CHLORIDE SERPL-SCNC: 106 MMOL/L (ref 97–108)
CO2 SERPL-SCNC: 30 MMOL/L (ref 21–32)
COMMENT:: NORMAL
CREAT SERPL-MCNC: 0.84 MG/DL (ref 0.7–1.3)
DIFFERENTIAL METHOD BLD: NORMAL
EOSINOPHIL # BLD: 0.2 K/UL (ref 0–0.4)
EOSINOPHIL NFR BLD: 2 % (ref 0–7)
ERYTHROCYTE [DISTWIDTH] IN BLOOD BY AUTOMATED COUNT: 13.2 % (ref 11.5–14.5)
GLOBULIN SER CALC-MCNC: 3.2 G/DL (ref 2–4)
GLUCOSE SERPL-MCNC: 169 MG/DL (ref 65–100)
HCT VFR BLD AUTO: 41.5 % (ref 36.6–50.3)
HGB BLD-MCNC: 13.9 G/DL (ref 12.1–17)
IMM GRANULOCYTES # BLD AUTO: 0 K/UL (ref 0–0.04)
IMM GRANULOCYTES NFR BLD AUTO: 0 % (ref 0–0.5)
LYMPHOCYTES # BLD: 3.2 K/UL (ref 0.8–3.5)
LYMPHOCYTES NFR BLD: 38 % (ref 12–49)
MAGNESIUM SERPL-MCNC: 2.1 MG/DL (ref 1.6–2.4)
MCH RBC QN AUTO: 30.3 PG (ref 26–34)
MCHC RBC AUTO-ENTMCNC: 33.5 G/DL (ref 30–36.5)
MCV RBC AUTO: 90.6 FL (ref 80–99)
MONOCYTES # BLD: 0.5 K/UL (ref 0–1)
MONOCYTES NFR BLD: 6 % (ref 5–13)
NEUTS SEG # BLD: 4.6 K/UL (ref 1.8–8)
NEUTS SEG NFR BLD: 54 % (ref 32–75)
NRBC # BLD: 0 K/UL (ref 0–0.01)
NRBC BLD-RTO: 0 PER 100 WBC
PLATELET # BLD AUTO: 354 K/UL (ref 150–400)
PMV BLD AUTO: 10.2 FL (ref 8.9–12.9)
POTASSIUM SERPL-SCNC: 3.9 MMOL/L (ref 3.5–5.1)
PROT SERPL-MCNC: 7.1 G/DL (ref 6.4–8.2)
RBC # BLD AUTO: 4.58 M/UL (ref 4.1–5.7)
SODIUM SERPL-SCNC: 139 MMOL/L (ref 136–145)
SPECIMEN HOLD: NORMAL
TROPONIN I SERPL HS-MCNC: 7 NG/L (ref 0–76)
TSH SERPL DL<=0.05 MIU/L-ACNC: 1.68 UIU/ML (ref 0.36–3.74)
WBC # BLD AUTO: 8.5 K/UL (ref 4.1–11.1)

## 2023-08-07 PROCEDURE — 80053 COMPREHEN METABOLIC PANEL: CPT

## 2023-08-07 PROCEDURE — 99285 EMERGENCY DEPT VISIT HI MDM: CPT

## 2023-08-07 PROCEDURE — 84443 ASSAY THYROID STIM HORMONE: CPT

## 2023-08-07 PROCEDURE — 71045 X-RAY EXAM CHEST 1 VIEW: CPT

## 2023-08-07 PROCEDURE — 36415 COLL VENOUS BLD VENIPUNCTURE: CPT

## 2023-08-07 PROCEDURE — 70450 CT HEAD/BRAIN W/O DYE: CPT

## 2023-08-07 PROCEDURE — 84484 ASSAY OF TROPONIN QUANT: CPT

## 2023-08-07 PROCEDURE — 83735 ASSAY OF MAGNESIUM: CPT

## 2023-08-07 PROCEDURE — 94762 N-INVAS EAR/PLS OXIMTRY CONT: CPT

## 2023-08-07 PROCEDURE — 83036 HEMOGLOBIN GLYCOSYLATED A1C: CPT

## 2023-08-07 PROCEDURE — 1100000000 HC RM PRIVATE

## 2023-08-07 PROCEDURE — 93005 ELECTROCARDIOGRAM TRACING: CPT | Performed by: EMERGENCY MEDICINE

## 2023-08-07 PROCEDURE — 85025 COMPLETE CBC W/AUTO DIFF WBC: CPT

## 2023-08-07 ASSESSMENT — LIFESTYLE VARIABLES
HOW MANY STANDARD DRINKS CONTAINING ALCOHOL DO YOU HAVE ON A TYPICAL DAY: PATIENT DOES NOT DRINK
HOW OFTEN DO YOU HAVE A DRINK CONTAINING ALCOHOL: NEVER

## 2023-08-07 ASSESSMENT — PAIN - FUNCTIONAL ASSESSMENT: PAIN_FUNCTIONAL_ASSESSMENT: NONE - DENIES PAIN

## 2023-08-07 NOTE — ED TRIAGE NOTES
Patient presents to ED via EMS patient's wife called EMS stating that patient had passed out, patient states he did not feel well over all today with nausea, and has been fighting a sinus infection, patient states he was about to eat dinner when he felt dizzy then he does not remember anything else, per patient's wife patient passed out and was unresponsive. Upon arrival patient is alert and oriented x 4, denies pain.

## 2023-08-08 ENCOUNTER — APPOINTMENT (OUTPATIENT)
Facility: HOSPITAL | Age: 58
End: 2023-08-08
Attending: INTERNAL MEDICINE
Payer: MEDICARE

## 2023-08-08 PROBLEM — R55 SYNCOPE, UNSPECIFIED SYNCOPE TYPE: Status: ACTIVE | Noted: 2023-08-08

## 2023-08-08 LAB
ALBUMIN SERPL-MCNC: 3.3 G/DL (ref 3.5–5)
ALBUMIN/GLOB SERPL: 1 (ref 1.1–2.2)
ALP SERPL-CCNC: 83 U/L (ref 45–117)
ALT SERPL-CCNC: 37 U/L (ref 12–78)
ANION GAP SERPL CALC-SCNC: 5 MMOL/L (ref 5–15)
AST SERPL-CCNC: 20 U/L (ref 15–37)
BILIRUB SERPL-MCNC: 0.5 MG/DL (ref 0.2–1)
BUN SERPL-MCNC: 12 MG/DL (ref 6–20)
BUN/CREAT SERPL: 15 (ref 12–20)
CALCIUM SERPL-MCNC: 8.4 MG/DL (ref 8.5–10.1)
CHLORIDE SERPL-SCNC: 108 MMOL/L (ref 97–108)
CO2 SERPL-SCNC: 25 MMOL/L (ref 21–32)
CREAT SERPL-MCNC: 0.8 MG/DL (ref 0.7–1.3)
ECHO AO ROOT DIAM: 3.4 CM
ECHO AO ROOT INDEX: 1.48 CM/M2
ECHO AV AREA PEAK VELOCITY: 3.1 CM2
ECHO AV AREA/BSA PEAK VELOCITY: 1.4 CM2/M2
ECHO AV PEAK GRADIENT: 8 MMHG
ECHO AV PEAK VELOCITY: 1.4 M/S
ECHO AV VELOCITY RATIO: 0.93
ECHO BSA: 2.37 M2
ECHO LA DIAMETER INDEX: 1.4 CM/M2
ECHO LA DIAMETER: 3.2 CM
ECHO LA TO AORTIC ROOT RATIO: 0.94
ECHO LA VOL 2C: 72 ML (ref 18–58)
ECHO LA VOL 2C: 77 ML (ref 18–58)
ECHO LA VOL 4C: 42 ML (ref 18–58)
ECHO LA VOL 4C: 44 ML (ref 18–58)
ECHO LA VOLUME AREA LENGTH: 62 ML
ECHO LA VOLUME INDEX AREA LENGTH: 27 ML/M2 (ref 16–34)
ECHO LV E' LATERAL VELOCITY: 9 CM/S
ECHO LV E' SEPTAL VELOCITY: 7 CM/S
ECHO LV FRACTIONAL SHORTENING: 34 % (ref 28–44)
ECHO LV INTERNAL DIMENSION DIASTOLE INDEX: 1.92 CM/M2
ECHO LV INTERNAL DIMENSION DIASTOLIC: 4.4 CM (ref 4.2–5.9)
ECHO LV INTERNAL DIMENSION SYSTOLIC INDEX: 1.27 CM/M2
ECHO LV INTERNAL DIMENSION SYSTOLIC: 2.9 CM
ECHO LV IVSD: 1.6 CM (ref 0.6–1)
ECHO LV MASS 2D: 240.3 G (ref 88–224)
ECHO LV MASS INDEX 2D: 104.9 G/M2 (ref 49–115)
ECHO LV POSTERIOR WALL DIASTOLIC: 1.2 CM (ref 0.6–1)
ECHO LV RELATIVE WALL THICKNESS RATIO: 0.55
ECHO LVOT AREA: 3.5 CM2
ECHO LVOT DIAM: 2.1 CM
ECHO LVOT PEAK GRADIENT: 6 MMHG
ECHO LVOT PEAK VELOCITY: 1.3 M/S
ECHO MV A VELOCITY: 1.14 M/S
ECHO MV E DECELERATION TIME (DT): 246 MS
ECHO MV E VELOCITY: 0.6 M/S
ECHO MV E/A RATIO: 0.53
ECHO MV E/E' LATERAL: 6.67
ECHO MV E/E' RATIO (AVERAGED): 7.62
ECHO MV E/E' SEPTAL: 8.57
ECHO RA VOLUME: 36 ML
ECHO RA VOLUME: 38 ML
ECHO RV TAPSE: 1.5 CM (ref 1.7–?)
ECHO TV REGURGITANT MAX VELOCITY: 1.9 M/S
ECHO TV REGURGITANT PEAK GRADIENT: 14 MMHG
EKG ATRIAL RATE: 92 BPM
EKG DIAGNOSIS: NORMAL
EKG P AXIS: 43 DEGREES
EKG P-R INTERVAL: 146 MS
EKG Q-T INTERVAL: 386 MS
EKG QRS DURATION: 96 MS
EKG QTC CALCULATION (BAZETT): 477 MS
EKG R AXIS: -57 DEGREES
EKG T AXIS: 15 DEGREES
EKG VENTRICULAR RATE: 92 BPM
ERYTHROCYTE [DISTWIDTH] IN BLOOD BY AUTOMATED COUNT: 13.2 % (ref 11.5–14.5)
EST. AVERAGE GLUCOSE BLD GHB EST-MCNC: 160 MG/DL
GLOBULIN SER CALC-MCNC: 3.2 G/DL (ref 2–4)
GLUCOSE BLD STRIP.AUTO-MCNC: 106 MG/DL (ref 65–117)
GLUCOSE BLD STRIP.AUTO-MCNC: 132 MG/DL (ref 65–117)
GLUCOSE BLD STRIP.AUTO-MCNC: 173 MG/DL (ref 65–117)
GLUCOSE BLD STRIP.AUTO-MCNC: 201 MG/DL (ref 65–117)
GLUCOSE BLD STRIP.AUTO-MCNC: 211 MG/DL (ref 65–117)
GLUCOSE SERPL-MCNC: 196 MG/DL (ref 65–100)
HBA1C MFR BLD: 7.2 % (ref 4–5.6)
HCT VFR BLD AUTO: 39.4 % (ref 36.6–50.3)
HGB BLD-MCNC: 13 G/DL (ref 12.1–17)
MCH RBC QN AUTO: 30 PG (ref 26–34)
MCHC RBC AUTO-ENTMCNC: 33 G/DL (ref 30–36.5)
MCV RBC AUTO: 91 FL (ref 80–99)
NRBC # BLD: 0 K/UL (ref 0–0.01)
NRBC BLD-RTO: 0 PER 100 WBC
PLATELET # BLD AUTO: 360 K/UL (ref 150–400)
PMV BLD AUTO: 9.4 FL (ref 8.9–12.9)
POTASSIUM SERPL-SCNC: 3.7 MMOL/L (ref 3.5–5.1)
PROT SERPL-MCNC: 6.5 G/DL (ref 6.4–8.2)
RBC # BLD AUTO: 4.33 M/UL (ref 4.1–5.7)
SERVICE CMNT-IMP: ABNORMAL
SERVICE CMNT-IMP: NORMAL
SODIUM SERPL-SCNC: 138 MMOL/L (ref 136–145)
TROPONIN I SERPL HS-MCNC: 7 NG/L (ref 0–76)
WBC # BLD AUTO: 7.7 K/UL (ref 4.1–11.1)

## 2023-08-08 PROCEDURE — 6370000000 HC RX 637 (ALT 250 FOR IP): Performed by: HOSPITALIST

## 2023-08-08 PROCEDURE — 85027 COMPLETE CBC AUTOMATED: CPT

## 2023-08-08 PROCEDURE — 2580000003 HC RX 258: Performed by: HOSPITALIST

## 2023-08-08 PROCEDURE — 82962 GLUCOSE BLOOD TEST: CPT

## 2023-08-08 PROCEDURE — 36415 COLL VENOUS BLD VENIPUNCTURE: CPT

## 2023-08-08 PROCEDURE — 84484 ASSAY OF TROPONIN QUANT: CPT

## 2023-08-08 PROCEDURE — G0378 HOSPITAL OBSERVATION PER HR: HCPCS

## 2023-08-08 PROCEDURE — 96372 THER/PROPH/DIAG INJ SC/IM: CPT

## 2023-08-08 PROCEDURE — 80053 COMPREHEN METABOLIC PANEL: CPT

## 2023-08-08 PROCEDURE — 6360000002 HC RX W HCPCS: Performed by: STUDENT IN AN ORGANIZED HEALTH CARE EDUCATION/TRAINING PROGRAM

## 2023-08-08 PROCEDURE — 93306 TTE W/DOPPLER COMPLETE: CPT

## 2023-08-08 PROCEDURE — 6370000000 HC RX 637 (ALT 250 FOR IP): Performed by: STUDENT IN AN ORGANIZED HEALTH CARE EDUCATION/TRAINING PROGRAM

## 2023-08-08 RX ORDER — CARVEDILOL 6.25 MG/1
6.25 TABLET ORAL 2 TIMES DAILY WITH MEALS
Status: DISCONTINUED | OUTPATIENT
Start: 2023-08-08 | End: 2023-08-09 | Stop reason: HOSPADM

## 2023-08-08 RX ORDER — INSULIN LISPRO 100 [IU]/ML
0-16 INJECTION, SOLUTION INTRAVENOUS; SUBCUTANEOUS
Status: DISCONTINUED | OUTPATIENT
Start: 2023-08-08 | End: 2023-08-09 | Stop reason: HOSPADM

## 2023-08-08 RX ORDER — GLUCAGON 1 MG/ML
1 KIT INJECTION PRN
Status: DISCONTINUED | OUTPATIENT
Start: 2023-08-08 | End: 2023-08-09 | Stop reason: HOSPADM

## 2023-08-08 RX ORDER — DEXTROSE MONOHYDRATE 100 MG/ML
INJECTION, SOLUTION INTRAVENOUS CONTINUOUS PRN
Status: DISCONTINUED | OUTPATIENT
Start: 2023-08-08 | End: 2023-08-09 | Stop reason: HOSPADM

## 2023-08-08 RX ORDER — INSULIN LISPRO 100 [IU]/ML
5 INJECTION, SOLUTION INTRAVENOUS; SUBCUTANEOUS
Status: DISCONTINUED | OUTPATIENT
Start: 2023-08-08 | End: 2023-08-08

## 2023-08-08 RX ORDER — POTASSIUM CHLORIDE 20 MEQ/1
40 TABLET, EXTENDED RELEASE ORAL ONCE
Status: COMPLETED | OUTPATIENT
Start: 2023-08-08 | End: 2023-08-08

## 2023-08-08 RX ORDER — ONDANSETRON 4 MG/1
4 TABLET, ORALLY DISINTEGRATING ORAL EVERY 8 HOURS PRN
Status: DISCONTINUED | OUTPATIENT
Start: 2023-08-08 | End: 2023-08-09 | Stop reason: HOSPADM

## 2023-08-08 RX ORDER — ALBUTEROL SULFATE 90 UG/1
1 AEROSOL, METERED RESPIRATORY (INHALATION) EVERY 6 HOURS PRN
Status: DISCONTINUED | OUTPATIENT
Start: 2023-08-08 | End: 2023-08-09 | Stop reason: HOSPADM

## 2023-08-08 RX ORDER — ENOXAPARIN SODIUM 100 MG/ML
30 INJECTION SUBCUTANEOUS 2 TIMES DAILY
Status: DISCONTINUED | OUTPATIENT
Start: 2023-08-08 | End: 2023-08-09 | Stop reason: HOSPADM

## 2023-08-08 RX ORDER — ONDANSETRON 2 MG/ML
4 INJECTION INTRAMUSCULAR; INTRAVENOUS EVERY 6 HOURS PRN
Status: DISCONTINUED | OUTPATIENT
Start: 2023-08-08 | End: 2023-08-09 | Stop reason: HOSPADM

## 2023-08-08 RX ORDER — ACETAMINOPHEN 325 MG/1
650 TABLET ORAL EVERY 6 HOURS PRN
Status: DISCONTINUED | OUTPATIENT
Start: 2023-08-08 | End: 2023-08-09 | Stop reason: HOSPADM

## 2023-08-08 RX ORDER — ASPIRIN 81 MG/1
81 TABLET, CHEWABLE ORAL DAILY
Status: DISCONTINUED | OUTPATIENT
Start: 2023-08-08 | End: 2023-08-09 | Stop reason: HOSPADM

## 2023-08-08 RX ORDER — SODIUM CHLORIDE 9 MG/ML
INJECTION, SOLUTION INTRAVENOUS PRN
Status: DISCONTINUED | OUTPATIENT
Start: 2023-08-08 | End: 2023-08-09 | Stop reason: HOSPADM

## 2023-08-08 RX ORDER — ACETAMINOPHEN 650 MG/1
650 SUPPOSITORY RECTAL EVERY 6 HOURS PRN
Status: DISCONTINUED | OUTPATIENT
Start: 2023-08-08 | End: 2023-08-09 | Stop reason: HOSPADM

## 2023-08-08 RX ORDER — ATORVASTATIN CALCIUM 40 MG/1
40 TABLET, FILM COATED ORAL DAILY
Status: DISCONTINUED | OUTPATIENT
Start: 2023-08-08 | End: 2023-08-09 | Stop reason: HOSPADM

## 2023-08-08 RX ORDER — POTASSIUM CHLORIDE 1500 MG/1
40 TABLET, EXTENDED RELEASE ORAL ONCE
Status: DISCONTINUED | OUTPATIENT
Start: 2023-08-08 | End: 2023-08-08

## 2023-08-08 RX ORDER — INSULIN LISPRO 100 [IU]/ML
0-4 INJECTION, SOLUTION INTRAVENOUS; SUBCUTANEOUS NIGHTLY
Status: DISCONTINUED | OUTPATIENT
Start: 2023-08-08 | End: 2023-08-09 | Stop reason: HOSPADM

## 2023-08-08 RX ORDER — SODIUM CHLORIDE 0.9 % (FLUSH) 0.9 %
5-40 SYRINGE (ML) INJECTION EVERY 12 HOURS SCHEDULED
Status: DISCONTINUED | OUTPATIENT
Start: 2023-08-08 | End: 2023-08-09 | Stop reason: HOSPADM

## 2023-08-08 RX ORDER — SODIUM CHLORIDE 0.9 % (FLUSH) 0.9 %
5-40 SYRINGE (ML) INJECTION PRN
Status: DISCONTINUED | OUTPATIENT
Start: 2023-08-08 | End: 2023-08-09 | Stop reason: HOSPADM

## 2023-08-08 RX ORDER — INSULIN GLARGINE 100 [IU]/ML
15 INJECTION, SOLUTION SUBCUTANEOUS NIGHTLY
Status: DISCONTINUED | OUTPATIENT
Start: 2023-08-08 | End: 2023-08-09 | Stop reason: HOSPADM

## 2023-08-08 RX ADMIN — SODIUM CHLORIDE, PRESERVATIVE FREE 10 ML: 5 INJECTION INTRAVENOUS at 09:03

## 2023-08-08 RX ADMIN — ATORVASTATIN CALCIUM 40 MG: 40 TABLET, FILM COATED ORAL at 09:02

## 2023-08-08 RX ADMIN — ENOXAPARIN SODIUM 30 MG: 30 INJECTION SUBCUTANEOUS at 21:25

## 2023-08-08 RX ADMIN — POTASSIUM CHLORIDE 40 MEQ: 1500 TABLET, EXTENDED RELEASE ORAL at 11:03

## 2023-08-08 RX ADMIN — ASPIRIN 81 MG: 81 TABLET, CHEWABLE ORAL at 09:02

## 2023-08-08 RX ADMIN — Medication 4 UNITS: at 12:03

## 2023-08-08 RX ADMIN — SODIUM CHLORIDE, PRESERVATIVE FREE 10 ML: 5 INJECTION INTRAVENOUS at 21:25

## 2023-08-08 RX ADMIN — HYDROCHLOROTHIAZIDE: 25 TABLET ORAL at 09:02

## 2023-08-08 RX ADMIN — CARVEDILOL 6.25 MG: 6.25 TABLET, FILM COATED ORAL at 10:10

## 2023-08-08 RX ADMIN — CARVEDILOL 6.25 MG: 6.25 TABLET, FILM COATED ORAL at 16:54

## 2023-08-08 RX ADMIN — ENOXAPARIN SODIUM 30 MG: 30 INJECTION SUBCUTANEOUS at 12:04

## 2023-08-08 RX ADMIN — ACETAMINOPHEN 650 MG: 325 TABLET ORAL at 16:02

## 2023-08-08 ASSESSMENT — PAIN SCALES - GENERAL
PAINLEVEL_OUTOF10: 3
PAINLEVEL_OUTOF10: 0

## 2023-08-08 ASSESSMENT — PAIN DESCRIPTION - DESCRIPTORS: DESCRIPTORS: ACHING

## 2023-08-08 ASSESSMENT — PAIN DESCRIPTION - ORIENTATION: ORIENTATION: LOWER;POSTERIOR

## 2023-08-08 ASSESSMENT — PAIN - FUNCTIONAL ASSESSMENT: PAIN_FUNCTIONAL_ASSESSMENT: NONE - DENIES PAIN

## 2023-08-08 ASSESSMENT — PAIN DESCRIPTION - LOCATION: LOCATION: HEAD

## 2023-08-08 NOTE — ED PROVIDER NOTES
chest x-ray at 8:09 PM on 8/7/2023 interpreted by me: No pneumonia, no large pleural effusion, no pneumothorax    Interpretation per the Radiologist below, if available at the time of this note:     [address]   Final Result   1. No evidence of acute intracranial abnormality. XR CHEST PORTABLE   Final Result      No acute process on portable chest.                         EMERGENCY DEPARTMENT COURSE and DIFFERENTIAL DIAGNOSIS/MDM              CONSULTS: (Who and What was discussed)  IP CONSULT TO CARDIOLOGY        Records Reviewed (source and summary of external notes):   Reviewed patient's PCP note from 7/13/2023. Patient complained of palpitations and a cardiac event monitor was scheduled. CC/HPI Summary, DDx, ED Course, and Reassessment:   Patient presents to the ED with episode of syncope this evening. He reports feeling slightly dizzy earlier today and then had syncopal event where he fell in the kitchen this evening. History of palpitations and currently wearing a Holter monitor. He denies any chest pain, shortness of breath or palpitations at this time. There was concern about run of V. tach by initial EMS providers. Differential diagnosis includes orthostatic hypotension, vasovagl syncope, anemia, dehydration, arrhythmia, electrolyte abnormality, intracranial bleed    Labs and imaging reviewed. Head CT is unremarkable. Patient has been in normal sinus rhythm since arrival to ED. CMP is unremarkable. High-sensitivity troponin is normal at 7. CBC shows normal hemoglobin. Patient feeling better. Attempted to contact [employer] regarding results of Holter monitor at time of the event. [employer] rep states that they are unable to retrieve data. Given that patient has been having palpitations and now had a syncopal event. EMS reported possible run of V. tach on initial rhythm.   Will admit to hospitalist for further evaluation and monitoring, especially

## 2023-08-08 NOTE — ED NOTES
Full verbal report from [facility] AND [facility], assumed care of pt at this time. On monitor x3. Call bell in reach, pt sleeping at this time. Wife Eloy Schmidt at bedside. 4850: Pharmacy called by this RN to verify dulaglutide. 0: Dillon from Pharmacy called the ED, hospital does not carry dulaglutide / trulicity or any substitute. Ace-Lisandra messaged via perfect serve to notify that pt will  not be able to get 0323 dose. Provider read message at [phone] [place]: Pt has inpatient bed assigned, attempted to call report but unable to speak to receiving RN. Full verbal report given to Ryan Troy RN at this time. Transfer of care at this time.           Asad Gaitan RN  08/08/23 7753

## 2023-08-08 NOTE — H&P
also on aspirin 81 mg daily. Admitted to cardiac telemetry to stepdown unit due to suspicion of the ventricular tachycardia. Will follow-up with cardiology recommendations        Medications Home :    Reviewed as per external Rx history and patient. Code status : Full code    VTE prophylaxis :  Enoxaparin    Advance Medical directive : No advance medical directives on file. .         Discussion/MDM:   I have discussed patient's presentation/findings and clinical course to date with ED provider. Given the patient's current clinical presentation, I have a high level of concern for decompensation if discharged from the emergency department as patient has multiple medical comorbidities with increased risk of morbidity and mortality  that warrants admission to hospital.     I have reviewed patient's presenting subjective and objective findings, as well as all laboratory studies, imaging studies, and vital signs to date as well as treatment rendered and patient's response to those treatments. In addition, prior medical, surgical and relevant social and family histories were reviewed. CC : Sharona Eaton MD  Signed By: Mckenzie Vee MD     August 7, 2023      This dictation was done by dragon, computer voice recognition software. Often unanticipated grammatical, syntax, Spencer phones and other interpretive errors are inadvertently transcribed. Please excuse errors that have escaped final proofreading.

## 2023-08-08 NOTE — CONSULTS
Cardiology Consult Note    CC: Syncope    Barbie Angulo MD  Reason for consult:  Syncope  Requesting MD:  Dr. Kahn Primer Date: 8/7/2023   Today's Date: 8/8/2023      Cardiac Assessment/Plan:   1) Syncope: probable vasovagal; He is NOT having an ACS. 2) ?VT: nothing documented: cont to monitor. 3) HICKS: if unremarkable echo, will plan outpt stress cardiolite. 4) HTN  5) Dyslipidemia    6) DM  7) ANN: needs Rx (pt reports , c/w very severe ANN). 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 Admitted w/ syncope; no CP/dyspnea/acute palpitations; normal troponins and unremarkable ecg. Nl tele so far. Echo pending. Rec: process monitor (to look for arrhythmia); F/U echo and orthostatics. If echo unremarkable and no new issues, ok for outpt stress cardiolite and will restart 30 day EVR d/t syncope. Recommended no driving for 30 days after syncope and only if Sx resolved. Hospital Problem List:  Principal Problem:    Acute coronary syndrome [facility])  Active Problems:    Ventricular tachycardia ([address])    Syncope  Resolved Problems:    * No resolved hospital problems. *     ____________________________________________________________________  Freida Bhatia is a 59 y.o. male presents with Ventricular tachycardia ([address]) [I47.20]  Acute coronary syndrome ([address]) [I24.9]. As noted in H&P: \"59 y.o. male with below mentioned medical history presents to the emergency room due to an episode of syncope. As per the information he was not feeling good since morning, feeling dizzy and lightheaded. Came from work looks very drained. They were trying to eat, suddenly he felt not good with no appetite wanted to go into the room. While she was walking suddenly passed out. She observed, lost consciousness a little less than 1 minute but it felt like a long time. Patient denies any aura or symptoms. No chest pain or palpitations.   He just started feeling dizzy since morning that is worse

## 2023-08-09 VITALS
RESPIRATION RATE: 17 BRPM | TEMPERATURE: 97.2 F | WEIGHT: 250 LBS | HEIGHT: 70 IN | HEART RATE: 88 BPM | DIASTOLIC BLOOD PRESSURE: 99 MMHG | BODY MASS INDEX: 35.79 KG/M2 | SYSTOLIC BLOOD PRESSURE: 135 MMHG | OXYGEN SATURATION: 94 %

## 2023-08-09 LAB
ANION GAP SERPL CALC-SCNC: 4 MMOL/L (ref 5–15)
BUN SERPL-MCNC: 10 MG/DL (ref 6–20)
BUN/CREAT SERPL: 12 (ref 12–20)
CALCIUM SERPL-MCNC: 9.3 MG/DL (ref 8.5–10.1)
CHLORIDE SERPL-SCNC: 105 MMOL/L (ref 97–108)
CHOLEST SERPL-MCNC: 151 MG/DL
CO2 SERPL-SCNC: 27 MMOL/L (ref 21–32)
CREAT SERPL-MCNC: 0.81 MG/DL (ref 0.7–1.3)
GLUCOSE BLD STRIP.AUTO-MCNC: 142 MG/DL (ref 65–117)
GLUCOSE BLD STRIP.AUTO-MCNC: 165 MG/DL (ref 65–117)
GLUCOSE SERPL-MCNC: 122 MG/DL (ref 65–100)
HDLC SERPL-MCNC: 42 MG/DL
HDLC SERPL: 3.6 (ref 0–5)
LDLC SERPL CALC-MCNC: 86.4 MG/DL (ref 0–100)
POTASSIUM SERPL-SCNC: 3.8 MMOL/L (ref 3.5–5.1)
SERVICE CMNT-IMP: ABNORMAL
SERVICE CMNT-IMP: ABNORMAL
SODIUM SERPL-SCNC: 136 MMOL/L (ref 136–145)
TRIGL SERPL-MCNC: 113 MG/DL
VLDLC SERPL CALC-MCNC: 22.6 MG/DL

## 2023-08-09 PROCEDURE — 6370000000 HC RX 637 (ALT 250 FOR IP): Performed by: STUDENT IN AN ORGANIZED HEALTH CARE EDUCATION/TRAINING PROGRAM

## 2023-08-09 PROCEDURE — 6370000000 HC RX 637 (ALT 250 FOR IP): Performed by: HOSPITALIST

## 2023-08-09 PROCEDURE — 6360000002 HC RX W HCPCS: Performed by: STUDENT IN AN ORGANIZED HEALTH CARE EDUCATION/TRAINING PROGRAM

## 2023-08-09 PROCEDURE — 80048 BASIC METABOLIC PNL TOTAL CA: CPT

## 2023-08-09 PROCEDURE — 82962 GLUCOSE BLOOD TEST: CPT

## 2023-08-09 PROCEDURE — 2580000003 HC RX 258: Performed by: HOSPITALIST

## 2023-08-09 PROCEDURE — 80061 LIPID PANEL: CPT

## 2023-08-09 PROCEDURE — G0378 HOSPITAL OBSERVATION PER HR: HCPCS

## 2023-08-09 PROCEDURE — 96372 THER/PROPH/DIAG INJ SC/IM: CPT

## 2023-08-09 PROCEDURE — 36415 COLL VENOUS BLD VENIPUNCTURE: CPT

## 2023-08-09 RX ADMIN — CARVEDILOL 6.25 MG: 6.25 TABLET, FILM COATED ORAL at 08:18

## 2023-08-09 RX ADMIN — ASPIRIN 81 MG: 81 TABLET, CHEWABLE ORAL at 08:18

## 2023-08-09 RX ADMIN — ENOXAPARIN SODIUM 30 MG: 30 INJECTION SUBCUTANEOUS at 08:17

## 2023-08-09 RX ADMIN — HYDROCHLOROTHIAZIDE: 25 TABLET ORAL at 08:18

## 2023-08-09 RX ADMIN — SODIUM CHLORIDE, PRESERVATIVE FREE 10 ML: 5 INJECTION INTRAVENOUS at 08:17

## 2023-08-09 RX ADMIN — ATORVASTATIN CALCIUM 40 MG: 40 TABLET, FILM COATED ORAL at 08:18

## 2023-08-09 NOTE — PLAN OF CARE
Problem: Discharge Planning  Goal: Discharge to home or other facility with appropriate resources  Outcome: Adequate for Discharge     Problem: Chronic Conditions and Co-morbidities  Goal: Patient's chronic conditions and co-morbidity symptoms are monitored and maintained or improved  Outcome: Adequate for Discharge     Problem: Safety - Adult  Goal: Free from fall injury  Outcome: Adequate for Discharge     Problem: ABCDS Injury Assessment  Goal: Absence of physical injury  Outcome: Adequate for Discharge     Problem: Pain  Goal: Verbalizes/displays adequate comfort level or baseline comfort level  Outcome: Adequate for Discharge

## 2023-08-09 NOTE — DISCHARGE INSTRUCTIONS
Avoid driving for 30 days.   Please follow up with cardiology  Please follow up with sleep clinic for sleep studies

## 2023-08-09 NOTE — CARE COORDINATION
Patient is clear from a CM standpoint. Care Management Initial Assessment       RUR: 5% (low RUR)  Readmission? No  1st IM letter given? Yes - 8/8 and Code 44/Obs letter provided on 8/9/2023  1st  letter given: No    RN notified that the patient is clear from a CM standpoint. 08/09/23 0933   Service Assessment   Patient Orientation Alert and Oriented   Cognition Alert   History Provided By Patient   Primary Caregiver Self   Accompanied By/Relationship Spouse   Support Systems Spouse/Significant Other   Patient's Healthcare Decision Maker is: Patient Declined (Legal Next of Kin Remains as Decision Maker)  (Blank AD provided to patient as requested and he is aware to notify staff if he would like to fill this out.)   PCP Verified by CM Yes   Last Visit to PCP Within last 3 months  (1 month ago)   Prior Functional Level Independent in ADLs/IADLs   Current Functional Level Independent in ADLs/IADLs   Can patient return to prior living arrangement Yes   Ability to make needs known: Good   Family able to assist with home care needs: Yes   Would you like for me to discuss the discharge plan with any other family members/significant others, and if so, who? No   Financial Resources None   Community Resources None   Social/Functional History   Lives With Spouse   Type of Home   ([facility])   Home Layout Two level  (2 JORDON, about 12 to get upstairs)   Home Equipment Cane  (Patient had CPAP but stopped using after having COVID due to not knowing how to sanitize the device.)   ADL Assistance Independent   Homemaking Assistance Independent   Ambulation Assistance Independent   Active  Yes   Mode of Transportation Car   Discharge Planning   Type of Residence Other (Comment)  ([facility])   Living Arrangements Spouse/Significant Other   Current Services Prior To Admission C-pap   Potential Assistance Needed N/A   DME Ordered?  No   Potential Assistance Purchasing Medications No   Type of Home Care Services None

## 2023-08-09 NOTE — PROGRESS NOTES
18 - Transfer in report given from ED RN Piedad Morales - Dr. Max Abarca notified Hgb A1c pending from 8/7, no repeat needed this AM.     1035 - Cardiology consult called. TTE/echo ordered. 1210 - Orthostatic BP obtained, negative. Notified patient that cardiology would like heart monitor processed, patient's wife to send for processing when she returns to hospital.    1310 - Echo at bedside    1400 - Assisted pt up to BR, tolerated activity well. No complaints of dizziness. 0 - Patient's wife to return monitor for processing. 1605 - PRN tylenol administered for headache. Bedside shift change report given to Jose Mock (oncoming nurse) by Stephanie Hedrick (offgoing nurse). Report included the following information Nurse Handoff Report.
Attempted to schedule PCP hospital follow up appointment. Unable to reach anyone, unable to leave voicemail. Pending patient discharge.  Mirta Jin, Care Management Assistant
Bedside and Verbal shift change report given to Marnie LAFLEUR (oncoming nurse) by Tyrell Farah (offgoing nurse). Report included the following information Nurse Handoff Report, MAR, and Cardiac Rhythm NSR . Patient family member at bedside. 1145: Asked MD for return to work note for patient    Discharge paperwork completed with patient. Patient verbalized understanding. Opportunity for questions was provided. Patient belongings gathered. IV and telemetry removed.
Cardiology Progress Note  8/9/2023     Admit Date: 8/7/2023  Admit Diagnosis: Syncope  CC: none currently   Cardiac Assessment/Plan:    1) Syncope: probable vasovagal; He is NOT having an ACS. 2) ?VT: nothing documented: cont to monitor. 3) HICKS: if unremarkable echo, will plan outpt stress cardiolite. 4) HTN  5) Dyslipidemia     6) DM  7) ANN: needs Rx (pt reports , c/w very severe ANN). 8) OA  9) Asthma  10) Splenectomy d/t trauma in [facility]. Admitted w/ syncope; no CP/dyspnea/acute palpitations; normal troponins and unremarkable ecg. Nl tele so far. Echo pending. Rec: process monitor (to look for arrhythmia); F/U echo and orthostatics. If echo unremarkable and no new issues, ok for outpt stress cardiolite and will restart 30 day EVR d/t syncope. Recommended no driving for 30 days after syncope and only if Sx resolved. Echo 8/8/23:  Left Ventricle: Normal left ventricular systolic function with a visually estimated EF of 60 - 65%. Left ventricle size is normal. Increased wall thickness. Normal wall motion. (Septum 1.6, post 1.2; no NEERAJ or LVOT gradient). 8/9: No CP/dyspnea/palpitations/lightheadedness  -140; Not orthostatic; HR 70-90s; sinus; 94% RA  K 3.8; Cr 0.8; Nl CBC    Cardiac meds: asa; lipitor 40; coreg 6.25 bid; losartan/HCTZ (?100/25)    OK for d/c from cardiac standpoint. Need to see monitor results when available. Will set up outpt MPI, repeat 30 day EVR, and 3 month OV. (May need future cardiac MRI, will see). Dispo per primary team; d/w nursing.    ____________________________________________________________________  Kin Claros is a 59 y.o. male presents with Ventricular tachycardia ([address]) [I47.20]  Acute coronary syndrome ([address]) [I24.9]. As noted in H&P: \"59 y.o. male with below mentioned medical history presents to the emergency room due to an episode of syncope.   As per the information he was not feeling good since morning, feeling
To whom it may concern:    Mr. Radha Hines was admitted to [facility] from 8/7/23 to 8/9/23.        Dr. Michael Dixon
this note but were reviewed prior to creation of Plan. LABS:  I reviewed today's most current labs and imaging studies.   Pertinent labs include:  Recent Labs     08/07/23 1959 08/08/23  0508   WBC 8.5 7.7   HGB 13.9 13.0   HCT 41.5 39.4    360     Recent Labs     08/07/23 1959 08/08/23  0508    138   K 3.9 3.7    108   CO2 30 25   GLUCOSE 169* 196*   BUN 14 12   CREATININE 0.84 0.80   CALCIUM 9.3 8.4*   MG 2.1  --    LABALBU 3.9 3.3*   BILITOT 0.3 0.5   AST 16 20   ALT 38 37       Signed: Wanda Bartlett MD

## 2023-08-09 NOTE — DISCHARGE SUMMARY
Alert, oriented  _______________________________________________________________________  DISCHARGE MEDICATIONS:      Medication List        CONTINUE taking these medications      albuterol sulfate  (90 Base) MCG/ACT inhaler  Commonly known as: PROVENTIL;VENTOLIN;PROAIR     aspirin 81 MG chewable tablet     atorvastatin 40 MG tablet  Commonly known as: LIPITOR     benzonatate 200 MG capsule  Commonly known as: TESSALON     losartan-hydroCHLOROthiazide 100-25 MG per tablet  Commonly known as: HYZAAR  Take 1 tablet by mouth daily     Trulicity 3 OF/9.0 Sopn  Generic drug: Dulaglutide  Inject 3 mg into the skin every 7 days                Patient Follow Up Instructions: Activity: activity as tolerated  Diet: cardiac diet  Wound Care: none needed    Follow-up with PCP, cardiology in 1 week.   Follow-up tests/labs none  Follow-up Information       Follow up With Specialties Details Why Lisbeth Beyer MD Cardiothoracic Surgery, Cardiology Follow up in 3 month(s)  [address] [facility]  Follow up Please call [phone] to have stress cardiolite and 30 day event monitor set up. [address]    Olesya Olmos MD Internal Medicine Schedule an appointment as soon as possible for a visit in 1 week(s) To schedule your PCP hospital follow up. [address] [facility]  [phone]            ________________________________________________________________    Risk of deterioration: Moderate    Condition at Discharge:  Stable  __________________________________________________________________    Disposition  Home with family, no needs    ____________________________________________________________________    Code Status: Full Code  ___________________________________________________________________      Total time in minutes spent coordinating this

## 2023-08-24 RX ORDER — ATORVASTATIN CALCIUM 40 MG/1
TABLET, FILM COATED ORAL
Qty: 90 TABLET | Refills: 3 | Status: SHIPPED | OUTPATIENT
Start: 2023-08-24

## 2023-09-13 RX ORDER — LOSARTAN POTASSIUM AND HYDROCHLOROTHIAZIDE 12.5; 5 MG/1; MG/1
1 TABLET ORAL DAILY
Qty: 90 TABLET | Refills: 1 | OUTPATIENT
Start: 2023-09-13

## 2023-09-14 ENCOUNTER — TELEPHONE (OUTPATIENT)
Age: 58
End: 2023-09-14

## 2023-09-14 NOTE — TELEPHONE ENCOUNTER
Medication Refill Request    Freida Sriram is requesting a refill of the following medication(s):   atorvastatin (LIPITOR) 40 MG tablet  Please send refill to:     [facility] [id number] - [address] [phone]  [address] [place] [address]  Phone: [phone] Fax: [phone]

## 2023-09-17 RX ORDER — ATORVASTATIN CALCIUM 40 MG/1
40 TABLET, FILM COATED ORAL DAILY
Qty: 90 TABLET | Refills: 3 | OUTPATIENT
Start: 2023-09-17

## 2024-01-21 DIAGNOSIS — I10 ESSENTIAL HYPERTENSION, BENIGN: ICD-10-CM

## 2024-01-30 ENCOUNTER — TELEMEDICINE (OUTPATIENT)
Age: 59
End: 2024-01-30
Payer: MEDICARE

## 2024-01-30 DIAGNOSIS — I10 ESSENTIAL HYPERTENSION, BENIGN: ICD-10-CM

## 2024-01-30 DIAGNOSIS — E78.2 MIXED HYPERLIPIDEMIA: ICD-10-CM

## 2024-01-30 DIAGNOSIS — M15.9 GENERALIZED OSTEOARTHRITIS: ICD-10-CM

## 2024-01-30 DIAGNOSIS — Q89.09 ACCESSORY SPLEEN: ICD-10-CM

## 2024-01-30 DIAGNOSIS — E66.01 SEVERE OBESITY (BMI 35.0-39.9) WITH COMORBIDITY (HCC): ICD-10-CM

## 2024-01-30 DIAGNOSIS — N28.89 RIGHT KIDNEY MASS: ICD-10-CM

## 2024-01-30 DIAGNOSIS — E11.65 UNCONTROLLED TYPE 2 DIABETES MELLITUS WITH HYPERGLYCEMIA (HCC): Primary | ICD-10-CM

## 2024-01-30 PROBLEM — I47.20 VENTRICULAR TACHYCARDIA (HCC): Status: RESOLVED | Noted: 2023-08-07 | Resolved: 2024-01-30

## 2024-01-30 PROBLEM — R55 SYNCOPE: Status: RESOLVED | Noted: 2023-08-07 | Resolved: 2024-01-30

## 2024-01-30 PROBLEM — R93.2 ABNORMAL LIVER DIAGNOSTIC IMAGING: Status: RESOLVED | Noted: 2023-07-13 | Resolved: 2024-01-30

## 2024-01-30 PROBLEM — R55 SYNCOPE, UNSPECIFIED SYNCOPE TYPE: Status: RESOLVED | Noted: 2023-08-08 | Resolved: 2024-01-30

## 2024-01-30 PROBLEM — I24.9 ACUTE CORONARY SYNDROME (HCC): Status: RESOLVED | Noted: 2023-08-07 | Resolved: 2024-01-30

## 2024-01-30 PROCEDURE — 2022F DILAT RTA XM EVC RTNOPTHY: CPT | Performed by: INTERNAL MEDICINE

## 2024-01-30 PROCEDURE — 1036F TOBACCO NON-USER: CPT | Performed by: INTERNAL MEDICINE

## 2024-01-30 PROCEDURE — 3046F HEMOGLOBIN A1C LEVEL >9.0%: CPT | Performed by: INTERNAL MEDICINE

## 2024-01-30 PROCEDURE — G8417 CALC BMI ABV UP PARAM F/U: HCPCS | Performed by: INTERNAL MEDICINE

## 2024-01-30 PROCEDURE — G8427 DOCREV CUR MEDS BY ELIG CLIN: HCPCS | Performed by: INTERNAL MEDICINE

## 2024-01-30 PROCEDURE — 99214 OFFICE O/P EST MOD 30 MIN: CPT | Performed by: INTERNAL MEDICINE

## 2024-01-30 PROCEDURE — G8484 FLU IMMUNIZE NO ADMIN: HCPCS | Performed by: INTERNAL MEDICINE

## 2024-01-30 PROCEDURE — 3017F COLORECTAL CA SCREEN DOC REV: CPT | Performed by: INTERNAL MEDICINE

## 2024-01-30 ASSESSMENT — ENCOUNTER SYMPTOMS
ABDOMINAL PAIN: 0
SHORTNESS OF BREATH: 0
COUGH: 0

## 2024-01-30 NOTE — ASSESSMENT & PLAN NOTE
Follow up MRI showed benign appearing cysts, angiomyolipoma  [facility], no further follow ups needed   Subjective   Lenora Alvarado is a 64 y.o. female.     Chief Complaint   Patient presents with   • Annual Exam     MWV   • Hypertension   • Hyperlipidemia   • Hypothyroidism     For review and evaluation of management of chronic medical problems. Labs reviewed. Due for mammogram.    Hypertension   This is a chronic problem. The current episode started more than 1 year ago. The problem is unchanged. The problem is controlled. Pertinent negatives include no chest pain, headaches, neck pain, palpitations or shortness of breath. There are no associated agents to hypertension. Current antihypertension treatment includes beta blockers and angiotensin blockers. The current treatment provides significant improvement. There are no compliance problems.    Hyperlipidemia   This is a chronic problem. The current episode started more than 1 year ago. The problem is uncontrolled. Recent lipid tests were reviewed and are high. Exacerbating diseases include hypothyroidism. Pertinent negatives include no chest pain, myalgias or shortness of breath. The current treatment provides moderate improvement of lipids. Compliance problems include adherence to diet and adherence to exercise.    Hypothyroidism   This is a chronic problem. The current episode started more than 1 year ago. The problem occurs constantly. The problem has been unchanged. Pertinent negatives include no abdominal pain, arthralgias, chest pain, chills, congestion, coughing, fatigue, fever, headaches, joint swelling, myalgias, nausea, neck pain, numbness, rash, sore throat, vomiting or weakness. Nothing aggravates the symptoms. Treatments tried: levothyroxine. The treatment provided significant relief.      The following portions of the patient's history were reviewed and updated as appropriate: allergies, current medications, past family and social history and problem list.    Outpatient Medications Prior to Visit   Medication Sig Dispense Refill   • bisoprolol  (ZEBeta) 5 MG tablet Take 1 tablet by mouth 2 (Two) Times a Day. 180 tablet 3   • escitalopram (LEXAPRO) 20 MG tablet Take 1 tablet by mouth Daily. 90 tablet 3   • hydroxychloroquine (PLAQUENIL) 200 MG tablet Take 1 tablet by mouth 2 (Two) Times a Day. 180 tablet 3   • hydroxychloroquine (PLAQUENIL) 200 MG tablet Take 400 mg by mouth.     • levothyroxine (SYNTHROID, LEVOTHROID) 50 MCG tablet Take 1 tablet by mouth Daily. 90 tablet 3   • losartan (COZAAR) 25 MG tablet Take 1 tablet by mouth Daily. 90 tablet 3   • meloxicam (MOBIC) 15 MG tablet Take 1 tablet by mouth Daily As Needed for Moderate Pain . 90 tablet 3   • mirtazapine (REMERON) 15 MG tablet Take 1 tablet by mouth Every Night. 90 tablet 3   • omega-3 acid ethyl esters (LOVAZA) 1 G capsule Take 2 capsules by mouth 2 (Two) Times a Day. 360 capsule 3   • raNITIdine (ZANTAC) 300 MG tablet Take 1 tablet by mouth Every Night. 180 tablet 3   • meloxicam (MOBIC) 15 MG tablet Take 15 mg by mouth.     • vitamin D (ERGOCALCIFEROL) 52420 units capsule capsule Take 1 capsule by mouth 1 (One) Time Per Week. 12 capsule 0     No facility-administered medications prior to visit.        Review of Systems   Constitutional: Negative.  Negative for chills, fatigue, fever and unexpected weight change.   HENT: Negative.  Negative for congestion, ear pain, hearing loss, nosebleeds, rhinorrhea, sneezing, sore throat and tinnitus.    Eyes: Negative.  Negative for discharge.   Respiratory: Negative.  Negative for cough, shortness of breath and wheezing.    Cardiovascular: Negative.  Negative for chest pain and palpitations.   Gastrointestinal: Negative.  Negative for abdominal pain, constipation, diarrhea, nausea and vomiting.   Endocrine: Negative.    Genitourinary: Negative.  Negative for dysuria, frequency and urgency.   Musculoskeletal: Negative.  Negative for arthralgias, back pain, joint swelling, myalgias and neck pain.   Skin: Negative.  Negative for rash.  "  Allergic/Immunologic: Negative.    Neurological: Negative.  Negative for dizziness, weakness, numbness and headaches.   Hematological: Negative.  Negative for adenopathy.   Psychiatric/Behavioral: Negative.  Negative for dysphoric mood and sleep disturbance. The patient is not nervous/anxious.      Objective     Visit Vitals  /70 (BP Location: Left arm, Patient Position: Sitting, Cuff Size: Adult)   Pulse 57   Ht 175.3 cm (69\")   Wt 97.8 kg (215 lb 11.2 oz)   SpO2 98%   BMI 31.85 kg/m²     Physical Exam   Constitutional: She is oriented to person, place, and time. She appears well-developed and well-nourished. No distress.   HENT:   Head: Normocephalic and atraumatic.   Nose: Nose normal.   Mouth/Throat: Oropharynx is clear and moist.   Eyes: Conjunctivae and EOM are normal. Pupils are equal, round, and reactive to light. Right eye exhibits no discharge. Left eye exhibits no discharge.   Neck: No tracheal deviation present. No thyromegaly present.   Cardiovascular: Normal rate, regular rhythm, normal heart sounds and intact distal pulses.   No murmur heard.  Pulmonary/Chest: Effort normal and breath sounds normal. No respiratory distress. She has no wheezes. She has no rales. She exhibits no tenderness. Right breast exhibits no inverted nipple, no mass, no nipple discharge, no skin change and no tenderness. Left breast exhibits no inverted nipple, no mass, no nipple discharge, no skin change and no tenderness.   Abdominal: Soft. Bowel sounds are normal. She exhibits no distension and no mass. There is no tenderness. No hernia.   Musculoskeletal: Normal range of motion. She exhibits no deformity.   Lymphadenopathy:     She has no cervical adenopathy.   Neurological: She is alert and oriented to person, place, and time. She has normal reflexes.   Skin: Skin is warm and dry.   Psychiatric: She has a normal mood and affect. Her behavior is normal. Judgment and thought content normal.   Nursing note and vitals " reviewed.    Results for orders placed or performed in visit on 12/03/18   Comprehensive Metabolic Panel   Result Value Ref Range    Glucose 100 60 - 100 mg/dL    BUN 18 7 - 21 mg/dL    Creatinine 1.20 (H) 0.50 - 1.00 mg/dL    Sodium 140 137 - 145 mmol/L    Potassium 4.3 3.5 - 5.1 mmol/L    Chloride 105 95 - 110 mmol/L    CO2 25.0 22.0 - 31.0 mmol/L    Calcium 9.6 8.4 - 10.2 mg/dL    Total Protein 7.6 6.3 - 8.6 g/dL    Albumin 4.50 3.40 - 4.80 g/dL    ALT (SGPT) 23 9 - 52 U/L    AST (SGOT) 29 14 - 36 U/L    Alkaline Phosphatase 63 38 - 126 U/L    Total Bilirubin 0.4 0.2 - 1.3 mg/dL    eGFR Non  Amer 45 45 - 104 mL/min/1.73    Globulin 3.1 2.3 - 3.5 gm/dL    A/G Ratio 1.5 1.1 - 1.8 g/dL    BUN/Creatinine Ratio 15.0 7.0 - 25.0    Anion Gap 10.0 5.0 - 15.0 mmol/L   Lipid Panel   Result Value Ref Range    Total Cholesterol 215 (H) 0 - 199 mg/dL    Triglycerides 184 20 - 199 mg/dL    HDL Cholesterol 57 (L) 60 - 200 mg/dL    LDL Cholesterol  100 1 - 129 mg/dL    LDL/HDL Ratio 2.13 0.00 - 3.22   TSH   Result Value Ref Range    TSH 0.730 0.460 - 4.680 mIU/mL   T4, Free   Result Value Ref Range    Free T4 0.99 0.78 - 2.19 ng/dL   Vitamin D 25 Hydroxy   Result Value Ref Range    25 Hydroxy, Vitamin D 61.3 30.0 - 100.0 ng/ml   CBC Auto Differential   Result Value Ref Range    WBC 6.08 3.20 - 9.80 10*3/mm3    RBC 4.33 3.77 - 5.16 10*6/mm3    Hemoglobin 12.6 12.0 - 15.5 g/dL    Hematocrit 37.9 35.0 - 45.0 %    MCV 87.5 80.0 - 98.0 fL    MCH 29.1 26.5 - 34.0 pg    MCHC 33.2 31.4 - 36.0 g/dL    RDW 13.3 11.5 - 14.5 %    RDW-SD 42.8 36.4 - 46.3 fl    MPV 10.1 8.0 - 12.0 fL    Platelets 237 150 - 450 10*3/mm3    Neutrophil % 60.2 37.0 - 80.0 %    Lymphocyte % 28.3 10.0 - 50.0 %    Monocyte % 9.0 0.0 - 12.0 %    Eosinophil % 1.5 0.0 - 7.0 %    Basophil % 0.3 0.0 - 2.0 %    Immature Grans % 0.7 (H) 0.0 - 0.5 %    Neutrophils, Absolute 3.66 2.00 - 8.60 10*3/mm3    Lymphocytes, Absolute 1.72 0.60 - 4.20 10*3/mm3    Monocytes,  Absolute 0.55 0.00 - 0.90 10*3/mm3    Eosinophils, Absolute 0.09 0.00 - 0.70 10*3/mm3    Basophils, Absolute 0.02 0.00 - 0.20 10*3/mm3    Immature Grans, Absolute 0.04 (H) 0.00 - 0.02 10*3/mm3   Urinalysis With Culture If Indicated - Urine, Clean Catch   Result Value Ref Range    Color, UA Yellow Yellow, Straw, Dark Yellow, Linda    Appearance, UA Clear Clear    pH, UA 5.5 5.0 - 9.0    Specific Gravity, UA 1.013 1.003 - 1.030    Glucose, UA Negative Negative    Ketones, UA Negative Negative    Bilirubin, UA Negative Negative    Blood, UA Small (1+) (A) Negative    Protein, UA Negative Negative    Leuk Esterase, UA Negative Negative    Nitrite, UA Negative Negative    Urobilinogen, UA 0.2 E.U./dL 0.2 - 1.0 E.U./dL   Urinalysis, Microscopic Only - Urine, Clean Catch   Result Value Ref Range    RBC, UA 3-5 (A) None Seen /HPF    WBC, UA 0-2 None Seen, 0-2, 3-5 /HPF    Bacteria, UA None Seen None Seen /HPF    Squamous Epithelial Cells, UA None Seen None Seen, 0-2 /HPF    Hyaline Casts, UA None Seen None Seen /LPF    Methodology Automated Microscopy       Assessment/Plan   Problem List Items Addressed This Visit        Cardiovascular and Mediastinum    Essential hypertension (Chronic)    Hyperlipidemia (Chronic)       Endocrine    Acquired hypothyroidism (Chronic)      Other Visit Diagnoses     Medicare annual wellness visit, initial    -  Primary         Will notify regarding results.     No orders of the defined types were placed in this encounter.    Return in about 3 months (around 3/11/2019) for Recheck.

## 2024-01-30 NOTE — PROGRESS NOTES
1/30/2024    Govind Lyle 1965 is a 58 y.o. year old male established patient,   here for video visit to evaluate the following chief complaint(s):  Chief Complaint   Patient presents with    Diabetes    Hypertension           ASSESSMENT/PLAN:  Below is the assessment and plan developed based on review of pertinent history, physical exam, labs, studies, and medications.    1. Uncontrolled type 2 diabetes mellitus with hyperglycemia (HCC)  Assessment & Plan:   Increased trulicity last fall, patient reports BG better controlled, typically fasting around 100  Will get A1C now to guide management    Orders:  -     Hemoglobin A1C; Future  2. Right kidney mass  Assessment & Plan:  Follow up MRI showed benign appearing cysts, angiomyolipoma  [facility], no further follow ups needed  3. Severe obesity (BMI 35.0-39.9) with comorbidity (HCC)  Assessment & Plan:  Feels like he's stuck at a plateau  Discussed diet and exercise strategies for weight loss   Plan to get a bike to increase cardio, encouraged some weight training as well    4. Essential hypertension, benign  Assessment & Plan:  Syncopal episode last fall  VCS Dr LORI Conteh, reassuring nuc stress test 9/2023  Also wore long term monitor and that was reassuring  No home monitoring lately, will continue present regimen at this time  Advised on home monitoring of BP and keep a log   Orders:  -     CBC with Auto Differential; Future  -     Comprehensive Metabolic Panel; Future  5. Mixed hyperlipidemia  Assessment & Plan:     Lab Results   Component Value Date    LDLCALC 86.4 08/09/2023    LDLDIRECT 96 07/13/2023        Tolerating statin therapy, plan to continue current regimen pending lab results  Recheck labs to assess for response to medication, whether LDL is at target, and monitor for side effects    Orders:  -     CBC with Auto Differential; Future  -     Comprehensive Metabolic Panel; Future  -     Lipid Panel; Future  6. Accessory

## 2024-01-30 NOTE — ASSESSMENT & PLAN NOTE
Syncopal episode last fall  VCS Dr LORI Conteh, reassuring nuc stress test 9/2023  Also wore long term monitor and that was reassuring  No home monitoring lately, will continue present regimen at this time  Advised on home monitoring of BP and keep a log

## 2024-01-30 NOTE — ASSESSMENT & PLAN NOTE
Lab Results   Component Value Date    LDLCALC 86.4 08/09/2023    LDLDIRECT 96 07/13/2023        Tolerating statin therapy, plan to continue current regimen pending lab results  Recheck labs to assess for response to medication, whether LDL is at target, and monitor for side effects

## 2024-01-30 NOTE — ASSESSMENT & PLAN NOTE
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

## 2024-01-30 NOTE — ASSESSMENT & PLAN NOTE
Discussed that left groin pain after hernia repair may have a secondary cause like hip OA, if not improving would see ortho vs original surgeon

## 2024-01-30 NOTE — ASSESSMENT & PLAN NOTE
Increased trulicity last fall, patient reports BG better controlled, typically fasting around 100  Will get A1C now to guide management

## 2024-01-30 NOTE — ASSESSMENT & PLAN NOTE
Feels like he's stuck at a plateau  Discussed diet and exercise strategies for weight loss   Plan to get a bike to increase cardio, encouraged some weight training as well

## 2024-01-31 RX ORDER — LOSARTAN POTASSIUM AND HYDROCHLOROTHIAZIDE 25; 100 MG/1; MG/1
1 TABLET ORAL DAILY
Qty: 90 TABLET | Refills: 1 | Status: SHIPPED | OUTPATIENT
Start: 2024-01-31

## 2024-02-21 ENCOUNTER — TELEPHONE (OUTPATIENT)
Age: 59
End: 2024-02-21

## 2024-02-21 NOTE — TELEPHONE ENCOUNTER
Medication Refill Request    Govind Lyle is requesting a refill of the following medication(s):   Dulaglutide 4.5 MG/0.5ML SOPN                 Please send refill to:   [facility] - [facility] [address] - P [phone] - F [phone]

## 2024-02-23 NOTE — TELEPHONE ENCOUNTER
Verified patient identity with two identifiers. Spoke with patient by phone, informed per NP no refill before having January ordered. Patient verbalized understanding.

## 2024-02-29 DIAGNOSIS — E11.65 UNCONTROLLED TYPE 2 DIABETES MELLITUS WITH HYPERGLYCEMIA (HCC): Primary | ICD-10-CM

## 2024-03-08 DIAGNOSIS — E11.65 UNCONTROLLED TYPE 2 DIABETES MELLITUS WITH HYPERGLYCEMIA (HCC): ICD-10-CM

## 2024-03-28 ENCOUNTER — TELEPHONE (OUTPATIENT)
Age: 59
End: 2024-03-28

## 2024-03-28 NOTE — TELEPHONE ENCOUNTER
Pt receives Trulicity through [employer]. Application needs to be resubmitted for 2024 approval. Pt's spouse notified and will come into office 3/29/24 to complete application.

## 2024-05-03 ENCOUNTER — OFFICE VISIT (OUTPATIENT)
Age: 59
End: 2024-05-03
Payer: MEDICARE

## 2024-05-03 VITALS
TEMPERATURE: 98 F | SYSTOLIC BLOOD PRESSURE: 133 MMHG | RESPIRATION RATE: 18 BRPM | WEIGHT: 259.2 LBS | BODY MASS INDEX: 38.39 KG/M2 | HEIGHT: 69 IN | OXYGEN SATURATION: 95 % | DIASTOLIC BLOOD PRESSURE: 86 MMHG | HEART RATE: 82 BPM

## 2024-05-03 DIAGNOSIS — E78.2 MIXED HYPERLIPIDEMIA: ICD-10-CM

## 2024-05-03 DIAGNOSIS — I10 ESSENTIAL HYPERTENSION, BENIGN: ICD-10-CM

## 2024-05-03 DIAGNOSIS — G47.31 PRIMARY CENTRAL SLEEP APNEA: ICD-10-CM

## 2024-05-03 DIAGNOSIS — J45.909 INTRINSIC ASTHMA: ICD-10-CM

## 2024-05-03 DIAGNOSIS — Z00.00 ENCOUNTER FOR SUBSEQUENT ANNUAL WELLNESS VISIT (AWV) IN MEDICARE PATIENT: Primary | ICD-10-CM

## 2024-05-03 DIAGNOSIS — E11.65 UNCONTROLLED TYPE 2 DIABETES MELLITUS WITH HYPERGLYCEMIA (HCC): ICD-10-CM

## 2024-05-03 DIAGNOSIS — M50.90 CERVICAL DISC DISORDER: ICD-10-CM

## 2024-05-03 DIAGNOSIS — E66.01 SEVERE OBESITY (BMI 35.0-39.9) WITH COMORBIDITY (HCC): ICD-10-CM

## 2024-05-03 DIAGNOSIS — R42 VERTIGO: ICD-10-CM

## 2024-05-03 LAB
ALBUMIN SERPL-MCNC: 4 G/DL (ref 3.5–5)
ALBUMIN/GLOB SERPL: 1.3 (ref 1.1–2.2)
ALP SERPL-CCNC: 99 U/L (ref 45–117)
ALT SERPL-CCNC: 39 U/L (ref 12–78)
ANION GAP SERPL CALC-SCNC: 4 MMOL/L (ref 5–15)
AST SERPL-CCNC: 12 U/L (ref 15–37)
BASOPHILS # BLD: 0.1 K/UL (ref 0–0.1)
BASOPHILS NFR BLD: 1 % (ref 0–1)
BILIRUB SERPL-MCNC: 0.6 MG/DL (ref 0.2–1)
BUN SERPL-MCNC: 12 MG/DL (ref 6–20)
BUN/CREAT SERPL: 15 (ref 12–20)
CALCIUM SERPL-MCNC: 9.4 MG/DL (ref 8.5–10.1)
CHLORIDE SERPL-SCNC: 109 MMOL/L (ref 97–108)
CHOLEST SERPL-MCNC: 149 MG/DL
CO2 SERPL-SCNC: 28 MMOL/L (ref 21–32)
CREAT SERPL-MCNC: 0.81 MG/DL (ref 0.7–1.3)
DIFFERENTIAL METHOD BLD: NORMAL
EOSINOPHIL # BLD: 0.2 K/UL (ref 0–0.4)
EOSINOPHIL NFR BLD: 3 % (ref 0–7)
ERYTHROCYTE [DISTWIDTH] IN BLOOD BY AUTOMATED COUNT: 13.2 % (ref 11.5–14.5)
EST. AVERAGE GLUCOSE BLD GHB EST-MCNC: 157 MG/DL
GLOBULIN SER CALC-MCNC: 3.1 G/DL (ref 2–4)
GLUCOSE SERPL-MCNC: 165 MG/DL (ref 65–100)
HBA1C MFR BLD: 7.1 % (ref 4–5.6)
HCT VFR BLD AUTO: 40.9 % (ref 36.6–50.3)
HDLC SERPL-MCNC: 44 MG/DL
HDLC SERPL: 3.4 (ref 0–5)
HGB BLD-MCNC: 13.9 G/DL (ref 12.1–17)
IMM GRANULOCYTES # BLD AUTO: 0 K/UL (ref 0–0.04)
IMM GRANULOCYTES NFR BLD AUTO: 0 % (ref 0–0.5)
LDLC SERPL CALC-MCNC: 94 MG/DL (ref 0–100)
LYMPHOCYTES # BLD: 2.4 K/UL (ref 0.8–3.5)
LYMPHOCYTES NFR BLD: 36 % (ref 12–49)
MCH RBC QN AUTO: 30.5 PG (ref 26–34)
MCHC RBC AUTO-ENTMCNC: 34 G/DL (ref 30–36.5)
MCV RBC AUTO: 89.9 FL (ref 80–99)
MONOCYTES # BLD: 0.4 K/UL (ref 0–1)
MONOCYTES NFR BLD: 5 % (ref 5–13)
NEUTS SEG # BLD: 3.7 K/UL (ref 1.8–8)
NEUTS SEG NFR BLD: 55 % (ref 32–75)
NRBC # BLD: 0 K/UL (ref 0–0.01)
NRBC BLD-RTO: 0 PER 100 WBC
PLATELET # BLD AUTO: 359 K/UL (ref 150–400)
PMV BLD AUTO: 10.1 FL (ref 8.9–12.9)
POTASSIUM SERPL-SCNC: 4.1 MMOL/L (ref 3.5–5.1)
PROT SERPL-MCNC: 7.1 G/DL (ref 6.4–8.2)
RBC # BLD AUTO: 4.55 M/UL (ref 4.1–5.7)
SODIUM SERPL-SCNC: 141 MMOL/L (ref 136–145)
TRIGL SERPL-MCNC: 55 MG/DL
VLDLC SERPL CALC-MCNC: 11 MG/DL
WBC # BLD AUTO: 6.8 K/UL (ref 4.1–11.1)

## 2024-05-03 PROCEDURE — 99213 OFFICE O/P EST LOW 20 MIN: CPT | Performed by: INTERNAL MEDICINE

## 2024-05-03 RX ORDER — ALBUTEROL SULFATE 90 UG/1
1 AEROSOL, METERED RESPIRATORY (INHALATION) EVERY 6 HOURS PRN
Qty: 18 G | Refills: 1 | Status: SHIPPED | OUTPATIENT
Start: 2024-05-03

## 2024-05-03 ASSESSMENT — LIFESTYLE VARIABLES
HOW OFTEN DO YOU HAVE A DRINK CONTAINING ALCOHOL: NEVER
HOW MANY STANDARD DRINKS CONTAINING ALCOHOL DO YOU HAVE ON A TYPICAL DAY: PATIENT DOES NOT DRINK

## 2024-05-03 ASSESSMENT — PATIENT HEALTH QUESTIONNAIRE - PHQ9
SUM OF ALL RESPONSES TO PHQ QUESTIONS 1-9: 0
1. LITTLE INTEREST OR PLEASURE IN DOING THINGS: NOT AT ALL
SUM OF ALL RESPONSES TO PHQ QUESTIONS 1-9: 0
2. FEELING DOWN, DEPRESSED OR HOPELESS: NOT AT ALL
SUM OF ALL RESPONSES TO PHQ9 QUESTIONS 1 & 2: 0

## 2024-05-03 NOTE — ASSESSMENT & PLAN NOTE
Had an episode of vertigo/syncope and had ER eval, rec to see ENT and Neuro, appt next week [facility]

## 2024-05-03 NOTE — PROGRESS NOTES
Chief Complaint   Patient presents with    Medicare AWV     Blood pressure 133/86, pulse 82, temperature 98 °F (36.7 °C), temperature source Oral, resp. rate 18, height 1.75 m (5' 8.9\"), weight 117.6 kg (259 lb 3.2 oz), SpO2 95 %.

## 2024-05-03 NOTE — ASSESSMENT & PLAN NOTE
Wt Readings from Last 3 Encounters:   05/03/24 117.6 kg (259 lb 3.2 oz)   08/08/23 113.4 kg (250 lb)   07/20/23 112 kg (247 lb)     Discussed diet and exercise strategies for weight loss

## 2024-05-03 NOTE — PROGRESS NOTES
Medicare Annual Wellness Visit    Govind Lyle  is here for Medicare AWV    Assessment & Plan   Encounter for subsequent annual wellness visit (AWV) in Medicare patient  Uncontrolled type 2 diabetes mellitus with hyperglycemia (HCC)  Assessment & Plan:   Increased trulicity last year, patient reports BG better controlled  Will get A1C now to guide management  Hemoglobin A1C   Date Value Ref Range Status   08/07/2023 7.2 (H) 4.0 - 5.6 % Final     Comment:     NEW METHOD  PLEASE NOTE NEW REFERENCE RANGE  (NOTE)  HbA1C Interpretive Ranges  <5.7              Normal  5.7 - 6.4         Consider Prediabetes  >6.5              Consider Diabetes         Severe obesity (BMI 35.0-39.9) with comorbidity (HCC)  Assessment & Plan:  Wt Readings from Last 3 Encounters:   05/03/24 117.6 kg (259 lb 3.2 oz)   08/08/23 113.4 kg (250 lb)   07/20/23 112 kg (247 lb)     Discussed diet and exercise strategies for weight loss   Mixed hyperlipidemia  Assessment & Plan:     Lab Results   Component Value Date    LDLDIRECT 96 07/13/2023        Tolerating statin therapy, plan to continue current regimen pending lab results  Recheck labs to assess for response to medication, whether LDL is at target, and monitor for side effects    Intrinsic asthma  Assessment & Plan:  Typically controlled with rare use of albuterol and no need for maintenance inhaler  Orders:  -     albuterol sulfate HFA (PROVENTIL;VENTOLIN;PROAIR) 108 (90 Base) MCG/ACT inhaler; Inhale 1 puff into the lungs every 6 hours as needed for Shortness of Breath, Disp-18 g, R-1Normal  Primary central sleep apnea  Assessment & Plan:  PAR monitoring, on BiPap  Vertigo  Assessment & Plan:   Had an episode of vertigo/syncope and had ER eval, rec to see ENT and Neuro, appt next week [facility]  Cervical disc disorder  -     External Referral To Physical Therapy    Recommendations for Preventive Services Due: see orders and patient instructions/AVS.  Recommended screening

## 2024-05-03 NOTE — ASSESSMENT & PLAN NOTE
Increased trulicity last year, patient reports BG better controlled  Will get A1C now to guide management  Hemoglobin A1C   Date Value Ref Range Status   08/07/2023 7.2 (H) 4.0 - 5.6 % Final     Comment:     NEW METHOD  PLEASE NOTE NEW REFERENCE RANGE  (NOTE)  HbA1C Interpretive Ranges  <5.7              Normal  5.7 - 6.4         Consider Prediabetes  >6.5              Consider Diabetes

## 2024-05-03 NOTE — PATIENT INSTRUCTIONS
your calcium requirement with diet alone, but a vitamin D supplement is usually necessary to meet this goal.  When exposed to the sun, use a sunscreen that protects against both UVA and UVB radiation with an SPF of 30 or greater. Reapply every 2 to 3 hours or after sweating, drying off with a towel, or swimming.  Always wear a seat belt when traveling in a car. Always wear a helmet when riding a bicycle or motorcycle.

## 2024-05-03 NOTE — ASSESSMENT & PLAN NOTE
Lab Results   Component Value Date    LDLDIRECT 96 07/13/2023        Tolerating statin therapy, plan to continue current regimen pending lab results  Recheck labs to assess for response to medication, whether LDL is at target, and monitor for side effects

## 2024-05-16 ENCOUNTER — TELEPHONE (OUTPATIENT)
Age: 59
End: 2024-05-16

## 2024-05-16 RX ORDER — MECLIZINE HYDROCHLORIDE 25 MG/1
25 TABLET ORAL 3 TIMES DAILY PRN
Qty: 30 TABLET | Refills: 0 | Status: SHIPPED | OUTPATIENT
Start: 2024-05-16 | End: 2024-05-26

## 2024-05-16 NOTE — TELEPHONE ENCOUNTER
Spoke to patient, advised him that his prescription was sent to [facility].

## 2024-05-16 NOTE — TELEPHONE ENCOUNTER
Patient's wife called.  She is requesting a refill for Meclizine 25 MG (?).  Patient was prescribed this medication after an ER visit in February for vertigo and nausea.  Patient had a neurologist's appointment this morning.  His wife said that the doctor was in the exam room for 2 minutes and they were unable to ask for a refill of this medication.  The neurologist ordered a MRI.  Patient is still experiencing vertigo and nausea.      Katalina Lyle  [phone]

## 2024-06-14 RX ORDER — MECLIZINE HYDROCHLORIDE 25 MG/1
25 TABLET ORAL 3 TIMES DAILY PRN
Qty: 30 TABLET | Refills: 3 | Status: SHIPPED | OUTPATIENT
Start: 2024-06-14 | End: 2024-06-24

## 2024-08-01 DIAGNOSIS — M50.90 CERVICAL DISC DISORDER: Primary | ICD-10-CM

## 2024-08-10 DIAGNOSIS — I10 ESSENTIAL HYPERTENSION, BENIGN: ICD-10-CM

## 2024-08-12 RX ORDER — LOSARTAN POTASSIUM AND HYDROCHLOROTHIAZIDE 25; 100 MG/1; MG/1
1 TABLET ORAL DAILY
Qty: 90 TABLET | Refills: 1 | Status: SHIPPED | OUTPATIENT
Start: 2024-08-12

## 2024-09-24 ENCOUNTER — OFFICE VISIT (OUTPATIENT)
Age: 59
End: 2024-09-24
Payer: MEDICARE

## 2024-09-24 VITALS
HEART RATE: 106 BPM | HEIGHT: 69 IN | DIASTOLIC BLOOD PRESSURE: 94 MMHG | WEIGHT: 259.4 LBS | OXYGEN SATURATION: 97 % | BODY MASS INDEX: 38.42 KG/M2 | TEMPERATURE: 97.1 F | RESPIRATION RATE: 16 BRPM | SYSTOLIC BLOOD PRESSURE: 154 MMHG

## 2024-09-24 DIAGNOSIS — M25.562 ACUTE PAIN OF LEFT KNEE: Primary | ICD-10-CM

## 2024-09-24 PROCEDURE — 99213 OFFICE O/P EST LOW 20 MIN: CPT | Performed by: INTERNAL MEDICINE

## 2024-09-24 PROCEDURE — 3077F SYST BP >= 140 MM HG: CPT | Performed by: INTERNAL MEDICINE

## 2024-09-24 PROCEDURE — 3017F COLORECTAL CA SCREEN DOC REV: CPT | Performed by: INTERNAL MEDICINE

## 2024-09-24 PROCEDURE — 3080F DIAST BP >= 90 MM HG: CPT | Performed by: INTERNAL MEDICINE

## 2024-09-24 PROCEDURE — G8427 DOCREV CUR MEDS BY ELIG CLIN: HCPCS | Performed by: INTERNAL MEDICINE

## 2024-09-24 PROCEDURE — G8417 CALC BMI ABV UP PARAM F/U: HCPCS | Performed by: INTERNAL MEDICINE

## 2024-09-24 PROCEDURE — 1036F TOBACCO NON-USER: CPT | Performed by: INTERNAL MEDICINE

## 2024-09-24 RX ORDER — DICLOFENAC SODIUM 75 MG/1
75 TABLET, DELAYED RELEASE ORAL 2 TIMES DAILY
Qty: 60 TABLET | Refills: 0 | Status: SHIPPED | OUTPATIENT
Start: 2024-09-24 | End: 2024-09-25

## 2024-09-24 SDOH — ECONOMIC STABILITY: FOOD INSECURITY: WITHIN THE PAST 12 MONTHS, THE FOOD YOU BOUGHT JUST DIDN'T LAST AND YOU DIDN'T HAVE MONEY TO GET MORE.: NEVER TRUE

## 2024-09-24 SDOH — ECONOMIC STABILITY: FOOD INSECURITY: WITHIN THE PAST 12 MONTHS, YOU WORRIED THAT YOUR FOOD WOULD RUN OUT BEFORE YOU GOT MONEY TO BUY MORE.: NEVER TRUE

## 2024-09-24 SDOH — ECONOMIC STABILITY: INCOME INSECURITY: HOW HARD IS IT FOR YOU TO PAY FOR THE VERY BASICS LIKE FOOD, HOUSING, MEDICAL CARE, AND HEATING?: NOT HARD AT ALL

## 2024-09-24 ASSESSMENT — PATIENT HEALTH QUESTIONNAIRE - PHQ9
SUM OF ALL RESPONSES TO PHQ9 QUESTIONS 1 & 2: 1
SUM OF ALL RESPONSES TO PHQ QUESTIONS 1-9: 1
SUM OF ALL RESPONSES TO PHQ QUESTIONS 1-9: 1
1. LITTLE INTEREST OR PLEASURE IN DOING THINGS: NOT AT ALL
SUM OF ALL RESPONSES TO PHQ QUESTIONS 1-9: 1
SUM OF ALL RESPONSES TO PHQ QUESTIONS 1-9: 1
2. FEELING DOWN, DEPRESSED OR HOPELESS: SEVERAL DAYS

## 2024-09-25 PROBLEM — M54.12 CERVICAL RADICULOPATHY: Status: ACTIVE | Noted: 2024-08-23

## 2024-09-25 PROBLEM — M47.812 CERVICAL SPONDYLOSIS: Status: ACTIVE | Noted: 2024-08-23

## 2024-09-25 PROBLEM — M54.17 LUMBOSACRAL RADICULOPATHY: Status: ACTIVE | Noted: 2024-08-23

## 2024-09-25 PROBLEM — M47.816 LUMBAR SPONDYLOSIS: Status: ACTIVE | Noted: 2024-08-23

## 2024-11-08 ENCOUNTER — OFFICE VISIT (OUTPATIENT)
Age: 59
End: 2024-11-08
Payer: MEDICARE

## 2024-11-08 VITALS
OXYGEN SATURATION: 96 % | DIASTOLIC BLOOD PRESSURE: 84 MMHG | BODY MASS INDEX: 37.82 KG/M2 | HEIGHT: 70 IN | SYSTOLIC BLOOD PRESSURE: 138 MMHG | WEIGHT: 264.2 LBS | TEMPERATURE: 98 F | HEART RATE: 95 BPM | RESPIRATION RATE: 16 BRPM

## 2024-11-08 DIAGNOSIS — M17.2 POST-TRAUMATIC OSTEOARTHRITIS OF BOTH KNEES: ICD-10-CM

## 2024-11-08 DIAGNOSIS — I10 ESSENTIAL HYPERTENSION, BENIGN: ICD-10-CM

## 2024-11-08 DIAGNOSIS — E78.2 MIXED HYPERLIPIDEMIA: ICD-10-CM

## 2024-11-08 DIAGNOSIS — E11.65 UNCONTROLLED TYPE 2 DIABETES MELLITUS WITH HYPERGLYCEMIA (HCC): Primary | ICD-10-CM

## 2024-11-08 DIAGNOSIS — Z12.5 SCREENING PSA (PROSTATE SPECIFIC ANTIGEN): ICD-10-CM

## 2024-11-08 DIAGNOSIS — M54.12 CERVICAL RADICULOPATHY: ICD-10-CM

## 2024-11-08 DIAGNOSIS — E11.65 UNCONTROLLED TYPE 2 DIABETES MELLITUS WITH HYPERGLYCEMIA (HCC): ICD-10-CM

## 2024-11-08 LAB
ANION GAP SERPL CALC-SCNC: 2 MMOL/L (ref 2–12)
BUN SERPL-MCNC: 12 MG/DL (ref 6–20)
BUN/CREAT SERPL: 15 (ref 12–20)
CALCIUM SERPL-MCNC: 8.5 MG/DL (ref 8.5–10.1)
CHLORIDE SERPL-SCNC: 109 MMOL/L (ref 97–108)
CO2 SERPL-SCNC: 29 MMOL/L (ref 21–32)
CREAT SERPL-MCNC: 0.81 MG/DL (ref 0.7–1.3)
CREAT UR-MCNC: 110 MG/DL
EST. AVERAGE GLUCOSE BLD GHB EST-MCNC: 192 MG/DL
GLUCOSE SERPL-MCNC: 142 MG/DL (ref 65–100)
HBA1C MFR BLD: 8.3 % (ref 4–5.6)
LDLC SERPL DIRECT ASSAY-MCNC: 142 MG/DL (ref 0–100)
MICROALBUMIN UR-MCNC: 1.09 MG/DL
MICROALBUMIN/CREAT UR-RTO: 10 MG/G (ref 0–30)
POTASSIUM SERPL-SCNC: 3.8 MMOL/L (ref 3.5–5.1)
PSA SERPL-MCNC: 0.5 NG/ML (ref 0.01–4)
SODIUM SERPL-SCNC: 140 MMOL/L (ref 136–145)
SPECIMEN HOLD: NORMAL

## 2024-11-08 PROCEDURE — 1036F TOBACCO NON-USER: CPT | Performed by: INTERNAL MEDICINE

## 2024-11-08 PROCEDURE — 2022F DILAT RTA XM EVC RTNOPTHY: CPT | Performed by: INTERNAL MEDICINE

## 2024-11-08 PROCEDURE — 99214 OFFICE O/P EST MOD 30 MIN: CPT | Performed by: INTERNAL MEDICINE

## 2024-11-08 PROCEDURE — G8484 FLU IMMUNIZE NO ADMIN: HCPCS | Performed by: INTERNAL MEDICINE

## 2024-11-08 PROCEDURE — 3079F DIAST BP 80-89 MM HG: CPT | Performed by: INTERNAL MEDICINE

## 2024-11-08 PROCEDURE — 3017F COLORECTAL CA SCREEN DOC REV: CPT | Performed by: INTERNAL MEDICINE

## 2024-11-08 PROCEDURE — G8427 DOCREV CUR MEDS BY ELIG CLIN: HCPCS | Performed by: INTERNAL MEDICINE

## 2024-11-08 PROCEDURE — G8417 CALC BMI ABV UP PARAM F/U: HCPCS | Performed by: INTERNAL MEDICINE

## 2024-11-08 PROCEDURE — 3051F HG A1C>EQUAL 7.0%<8.0%: CPT | Performed by: INTERNAL MEDICINE

## 2024-11-08 PROCEDURE — 3075F SYST BP GE 130 - 139MM HG: CPT | Performed by: INTERNAL MEDICINE

## 2024-11-08 ASSESSMENT — ENCOUNTER SYMPTOMS
COUGH: 0
BACK PAIN: 1
SHORTNESS OF BREATH: 0
ABDOMINAL PAIN: 0

## 2024-11-08 NOTE — ASSESSMENT & PLAN NOTE
Control improving    Hemoglobin A1C   Date Value Ref Range Status   05/03/2024 7.1 (H) 4.0 - 5.6 % Final     Comment:     (NOTE)  HbA1C Interpretive Ranges  <5.7              Normal  5.7 - 6.4         Consider Prediabetes  >6.5              Consider Diabetes         No hypoglycemia or other concerning side effect with current regimen, plan to continue  Continue watching carbs in the diet and regular exercise  Ongoing q6mo lab monitoring to assess control and guide further management

## 2024-11-08 NOTE — PROGRESS NOTES
SUBJECTIVE/OBJECTIVE:  Patient here for review of chronic conditions, with statuses as documented in Assessment and Plan      Review of Systems   Constitutional:  Negative for chills and fever.   Respiratory:  Negative for cough and shortness of breath.    Cardiovascular:  Negative for chest pain, palpitations and leg swelling.   Gastrointestinal:  Negative for abdominal pain.   Musculoskeletal:  Positive for arthralgias, back pain, gait problem and neck pain.   Skin:  Negative for rash.          Physical Exam  Constitutional:       General: He is not in acute distress.     Appearance: Normal appearance. He is not ill-appearing.   HENT:      Head: Normocephalic and atraumatic.   Eyes:      Conjunctiva/sclera: Conjunctivae normal.   Cardiovascular:      Rate and Rhythm: Normal rate and regular rhythm.      Heart sounds: No murmur heard.  Pulmonary:      Effort: Pulmonary effort is normal. No respiratory distress.      Breath sounds: Normal breath sounds.   Musculoskeletal:      Cervical back: Normal range of motion.      Right lower leg: No edema.      Left lower leg: No edema.      Comments: Bilateral knee braces   Skin:     General: Skin is warm and dry.   Neurological:      General: No focal deficit present.      Mental Status: He is alert and oriented to person, place, and time. Mental status is at baseline.      Gait: Gait normal.   Psychiatric:         Mood and Affect: Mood normal.         Behavior: Behavior normal.          Vitals:    11/08/24 0743 11/08/24 0808   BP: (!) 140/86 138/84   Pulse: 95    Resp: 16    Temp: 98 °F (36.7 °C)    SpO2: 96%    Weight: 119.8 kg (264 lb 3.2 oz)    Height: 1.778 m (5' 10\")         The following sections were reviewed & updated as appropriate: Problem List, Allergies, PMH, PSH, FH, and SH.    Patient Active Problem List   Diagnosis    Mixed hyperlipidemia    Sleep apnea    Cervical disc disorder    Onychomycosis    Intrinsic asthma    Stasis edema of right lower

## 2024-11-11 ENCOUNTER — TELEPHONE (OUTPATIENT)
Age: 59
End: 2024-11-11

## 2024-11-19 ENCOUNTER — HOSPITAL ENCOUNTER (OUTPATIENT)
Facility: HOSPITAL | Age: 59
Discharge: HOME | End: 2024-11-22
Payer: MEDICARE

## 2024-11-19 ENCOUNTER — TELEMEDICINE (OUTPATIENT)
Age: 59
End: 2024-11-19
Payer: MEDICARE

## 2024-11-19 VITALS
DIASTOLIC BLOOD PRESSURE: 93 MMHG | OXYGEN SATURATION: 96 % | WEIGHT: 266 LBS | HEIGHT: 70 IN | HEART RATE: 90 BPM | SYSTOLIC BLOOD PRESSURE: 142 MMHG | BODY MASS INDEX: 38.08 KG/M2 | TEMPERATURE: 98.4 F

## 2024-11-19 DIAGNOSIS — E11.65 UNCONTROLLED TYPE 2 DIABETES MELLITUS WITH HYPERGLYCEMIA (HCC): Primary | ICD-10-CM

## 2024-11-19 DIAGNOSIS — I10 ESSENTIAL HYPERTENSION, BENIGN: ICD-10-CM

## 2024-11-19 DIAGNOSIS — E78.2 MIXED HYPERLIPIDEMIA: ICD-10-CM

## 2024-11-19 LAB
ABO + RH BLD: NORMAL
APPEARANCE UR: CLEAR
BACTERIA URNS QL MICRO: NEGATIVE /HPF
BILIRUB UR QL: NEGATIVE
BLOOD GROUP ANTIBODIES SERPL: NORMAL
COLOR UR: NORMAL
EKG ATRIAL RATE: 72 BPM
EKG DIAGNOSIS: NORMAL
EKG P AXIS: 13 DEGREES
EKG P-R INTERVAL: 154 MS
EKG Q-T INTERVAL: 416 MS
EKG QRS DURATION: 96 MS
EKG QTC CALCULATION (BAZETT): 455 MS
EKG R AXIS: -26 DEGREES
EKG T AXIS: -2 DEGREES
EKG VENTRICULAR RATE: 72 BPM
EPITH CASTS URNS QL MICRO: NORMAL /LPF
ERYTHROCYTE [DISTWIDTH] IN BLOOD BY AUTOMATED COUNT: 13.7 % (ref 11.5–14.5)
GLUCOSE UR STRIP.AUTO-MCNC: NEGATIVE MG/DL
HCT VFR BLD AUTO: 39.2 % (ref 36.6–50.3)
HGB BLD-MCNC: 12.7 G/DL (ref 12.1–17)
HGB UR QL STRIP: NEGATIVE
HYALINE CASTS URNS QL MICRO: NORMAL /LPF (ref 0–5)
INR PPP: 1 (ref 0.9–1.1)
KETONES UR QL STRIP.AUTO: NEGATIVE MG/DL
LEUKOCYTE ESTERASE UR QL STRIP.AUTO: NEGATIVE
MCH RBC QN AUTO: 30 PG (ref 26–34)
MCHC RBC AUTO-ENTMCNC: 32.4 G/DL (ref 30–36.5)
MCV RBC AUTO: 92.7 FL (ref 80–99)
NITRITE UR QL STRIP.AUTO: NEGATIVE
NRBC # BLD: 0 K/UL (ref 0–0.01)
NRBC BLD-RTO: 0 PER 100 WBC
PH UR STRIP: 5.5 (ref 5–8)
PLATELET # BLD AUTO: 352 K/UL (ref 150–400)
PMV BLD AUTO: 10.1 FL (ref 8.9–12.9)
PROT UR STRIP-MCNC: NEGATIVE MG/DL
PROTHROMBIN TIME: 10.9 SEC (ref 9–11.1)
RBC # BLD AUTO: 4.23 M/UL (ref 4.1–5.7)
RBC #/AREA URNS HPF: NORMAL /HPF (ref 0–5)
SP GR UR REFRACTOMETRY: 1.02 (ref 1–1.03)
SPECIMEN EXP DATE BLD: NORMAL
URINE CULTURE IF INDICATED: NORMAL
UROBILINOGEN UR QL STRIP.AUTO: 0.2 EU/DL (ref 0.2–1)
WBC # BLD AUTO: 6.6 K/UL (ref 4.1–11.1)
WBC URNS QL MICRO: NORMAL /HPF (ref 0–4)

## 2024-11-19 PROCEDURE — 3017F COLORECTAL CA SCREEN DOC REV: CPT | Performed by: INTERNAL MEDICINE

## 2024-11-19 PROCEDURE — 85027 COMPLETE CBC AUTOMATED: CPT

## 2024-11-19 PROCEDURE — 86850 RBC ANTIBODY SCREEN: CPT

## 2024-11-19 PROCEDURE — 2022F DILAT RTA XM EVC RTNOPTHY: CPT | Performed by: INTERNAL MEDICINE

## 2024-11-19 PROCEDURE — 3052F HG A1C>EQUAL 8.0%<EQUAL 9.0%: CPT | Performed by: INTERNAL MEDICINE

## 2024-11-19 PROCEDURE — 86900 BLOOD TYPING SEROLOGIC ABO: CPT

## 2024-11-19 PROCEDURE — 86901 BLOOD TYPING SEROLOGIC RH(D): CPT

## 2024-11-19 PROCEDURE — 93005 ELECTROCARDIOGRAM TRACING: CPT | Performed by: ORTHOPAEDIC SURGERY

## 2024-11-19 PROCEDURE — 36415 COLL VENOUS BLD VENIPUNCTURE: CPT

## 2024-11-19 PROCEDURE — 81001 URINALYSIS AUTO W/SCOPE: CPT

## 2024-11-19 PROCEDURE — 99213 OFFICE O/P EST LOW 20 MIN: CPT | Performed by: INTERNAL MEDICINE

## 2024-11-19 PROCEDURE — APPNB30 APP NON BILLABLE TIME 0-30 MINS: Performed by: NURSE PRACTITIONER

## 2024-11-19 PROCEDURE — 85610 PROTHROMBIN TIME: CPT

## 2024-11-19 PROCEDURE — G8427 DOCREV CUR MEDS BY ELIG CLIN: HCPCS | Performed by: INTERNAL MEDICINE

## 2024-11-19 RX ORDER — BLOOD-GLUCOSE METER
1 KIT MISCELLANEOUS DAILY
Qty: 1 KIT | Refills: 0 | Status: SHIPPED | OUTPATIENT
Start: 2024-11-19

## 2024-11-19 RX ORDER — GLUCOSAMINE HCL/CHONDROITIN SU 500-400 MG
CAPSULE ORAL
Qty: 100 STRIP | Refills: 1 | Status: SHIPPED | OUTPATIENT
Start: 2024-11-19

## 2024-11-19 RX ORDER — LANCETS 30 GAUGE
1 EACH MISCELLANEOUS DAILY
Qty: 100 EACH | Refills: 5 | Status: SHIPPED | OUTPATIENT
Start: 2024-11-19

## 2024-11-19 RX ORDER — SERTRALINE HYDROCHLORIDE 25 MG/1
25 TABLET, FILM COATED ORAL DAILY
COMMUNITY

## 2024-11-19 RX ORDER — ATORVASTATIN CALCIUM 40 MG/1
40 TABLET, FILM COATED ORAL NIGHTLY
Qty: 90 TABLET | Refills: 1 | Status: SHIPPED | OUTPATIENT
Start: 2024-11-19

## 2024-11-19 ASSESSMENT — PROMIS GLOBAL HEALTH SCALE
WHO IS THE PERSON COMPLETING THE PROMIS V1.1 SURVEY?: SELF
SUM OF RESPONSES TO QUESTIONS 2, 4, 5, & 10: 11
HOW IS THE PROMIS V1.1 BEING ADMINISTERED?: PAPER
IN GENERAL, HOW WOULD YOU RATE YOUR SATISFACTION WITH YOUR SOCIAL ACTIVITIES AND RELATIONSHIPS [ON A SCALE OF 1 (POOR) TO 5 (EXCELLENT)]?: FAIR
IN GENERAL, WOULD YOU SAY YOUR HEALTH IS...[ON A SCALE OF 1 (POOR) TO 5 (EXCELLENT)]: FAIR
IN THE PAST 7 DAYS, HOW WOULD YOU RATE YOUR FATIGUE ON AVERAGE [ON A SCALE FROM 1 (NONE) TO 5 (VERY SEVERE)]?: VERY SEVERE
TO WHAT EXTENT ARE YOU ABLE TO CARRY OUT YOUR EVERYDAY PHYSICAL ACTIVITIES SUCH AS WALKING, CLIMBING STAIRS, CARRYING GROCERIES, OR MOVING A CHAIR [ON A SCALE OF 1 (NOT AT ALL) TO 5 (COMPLETELY)]?: NOT AT ALL
IN THE PAST 7 DAYS, HOW OFTEN HAVE YOU BEEN BOTHERED BY EMOTIONAL PROBLEMS, SUCH AS FEELING ANXIOUS, DEPRESSED, OR IRRITABLE [ON A SCALE FROM 1 (NEVER) TO 5 (ALWAYS)]?: OFTEN
IN GENERAL, HOW WOULD YOU RATE YOUR PHYSICAL HEALTH [ON A SCALE OF 1 (POOR) TO 5 (EXCELLENT)]?: POOR
SUM OF RESPONSES TO QUESTIONS 3, 6, 7, & 8: 13
IN GENERAL, WOULD YOU SAY YOUR QUALITY OF LIFE IS...[ON A SCALE OF 1 (POOR) TO 5 (EXCELLENT)]: GOOD
IN GENERAL, PLEASE RATE HOW WELL YOU CARRY OUT YOUR USUAL SOCIAL ACTIVITIES (INCLUDES ACTIVITIES AT HOME, AT WORK, AND IN YOUR COMMUNITY, AND RESPONSIBILITIES AS A PARENT, CHILD, SPOUSE, EMPLOYEE, FRIEND, ETC) [ON A SCALE OF 1 (POOR) TO 5 (EXCELLENT)]?: POOR
IN GENERAL, HOW WOULD YOU RATE YOUR MENTAL HEALTH, INCLUDING YOUR MOOD AND YOUR ABILITY TO THINK [ON A SCALE OF 1 (POOR) TO 5 (EXCELLENT)]?: VERY GOOD
IN THE PAST 7 DAYS, HOW WOULD YOU RATE YOUR PAIN ON AVERAGE [ON A SCALE FROM 0 (NO PAIN) TO 10 (WORST IMAGINABLE PAIN)]?: 10 WORST IMAGINABLE PAIN

## 2024-11-19 ASSESSMENT — PAIN DESCRIPTION - DESCRIPTORS: DESCRIPTORS: SHOOTING;SHARP

## 2024-11-19 ASSESSMENT — ENCOUNTER SYMPTOMS
SHORTNESS OF BREATH: 0
ABDOMINAL PAIN: 0
COUGH: 0
BACK PAIN: 1

## 2024-11-19 ASSESSMENT — KOOS JR
HOW SEVERE IS YOUR KNEE STIFFNESS AFTER FIRST WAKING IN MORNING: EXTREME
BENDING TO THE FLOOR TO PICK UP OBJECT: EXTREME
STANDING UPRIGHT: EXTREME
STRAIGHTENING KNEE FULLY: SEVERE
GOING UP OR DOWN STAIRS: EXTREME
TWISING OR PIVOTING ON KNEE: EXTREME
KOOS JR TOTAL INTERVAL SCORE: 8.291
RISING FROM SITTING: EXTREME

## 2024-11-19 ASSESSMENT — PAIN SCALES - GENERAL: PAINLEVEL_OUTOF10: 7

## 2024-11-19 ASSESSMENT — PAIN DESCRIPTION - LOCATION: LOCATION: NECK;HEAD

## 2024-11-19 ASSESSMENT — PAIN DESCRIPTION - PAIN TYPE: TYPE: CHRONIC PAIN

## 2024-11-19 NOTE — PERIOP NOTE
PAT: 11-19 @ 8;00 SCHEDULED BY WIFE  
an important role in your health and preparation for surgery. To reduce the germs on your skin you will need to shower with CHG soap (Chorhexidine gluconate 4%) two times before surgery.    CHG soap (Hibiclens, Hex-A-Clens or store brand)  CHG soap will be provided at your Preadmission Testing (PAT) appointment.  If you do not have a PAT appointment before surgery, you may arrange to  CHG soap from our office or purchase CHG soap at a pharmacy, grocery or department store.  You need to purchase TWO 4 ounce bottles to use for your 2 showers.    Shower with CHG soap 2 times before your surgery  The evening before your surgery  The morning of your surgery    Steps to follow:  Wash your hair with your normal shampoo and your body with regular soap and rinse well to remove shampoo and soap from your skin.  Wet a clean washcloth and turn off the shower.  Put CHG soap on washcloth and apply to your entire body from the neck down. Do not use on your head, face or private parts(genitals). Do not use CHG soap on open sores, wounds or areas of skin irritation.  Wash your body gently for 5 minutes. Do not wash your skin too hard. This soap does not create lather. Pay special attention to your underarms and from your belly button to your feet.  Turn the shower back on and rinse well to get CHG soap off your body.  Pat your skin dry with a clean, dry towel. Do not apply lotions or moisturizer.  Put on clean clothes and sleep on fresh bed sheets and do not allow pets to sleep with you.      Tips to help prevent infections after your surgery:  Protect your surgical wound from germs:  Hand washing is the most important thing you and your caregivers can do to prevent infections.  Keep your bandage clean and dry!  Do not touch your surgical wound.  Use clean, freshly washed towels and washcloths every time you shower; do not share bath linens with others.  Until your surgical wound is healed, wear clothing and sleep on bed 
Association  ------------------------------------------------------------                         Condition                  Frequency  ---------------------   ------------------------   ---------  Antinuclear Antibody,    SLE, mixed connective  Direct (SUZE-D)           tissue diseases  ---------------------   ------------------------   ---------  dsDNA                    SLE                        40 - 60%  ---------------------   ------------------------   ---------  Chromatin                Drug induced SLE                90%                          SLE                        48 - 97%  ---------------------   ------------------------   ---------  SSA (Ro)                 SLE                        25 - 35%                          Sjogren's Syndrome         40 - 70%                           Lupus                 100%  ---------------------   ------------------------   ---------  SSB (La)                 SLE                                                        10%                          Sjogren's Syndrome              30%  ---------------------   -----------------------    ---------  Sm (anti-Smith)          SLE                        15 - 30%  ---------------------   -----------------------    ---------  RNP                      Mixed Connective Tissue                          Disease                         95%  (U1 nRNP,                SLE                        30 - 50%  anti-ribonucleoprotein)  Polymyositis and/or                          Dermatomyositis                 20%  ---------------------   ------------------------   ---------  Scl-70 (antiDNA          Scleroderma (diffuse)      20 - 35%  topoisomerase)           Crest                           13%  ---------------------   ------------------------   ---------  Lenka-1                     Polymyositis and/or                          Dermatomyositis            20 - 40%  ---------------------   ------------------------   ---------  Centromere

## 2024-11-19 NOTE — ASSESSMENT & PLAN NOTE
Lab Results   Component Value Date    LDLDIRECT 142 (H) 11/08/2024      Had been without meds for a bit, discussed importance of adherence, will hold at atorvastatin 40mg

## 2024-11-19 NOTE — ASSESSMENT & PLAN NOTE
Hemoglobin A1C   Date Value Ref Range Status   11/08/2024 8.3 (H) 4.0 - 5.6 % Final     Comment:     (NOTE)  HbA1C Interpretive Ranges  <5.7              Normal  5.7 - 6.4         Consider Prediabetes  >6.5              Consider Diabetes     Worsening control with missing some meds and dietary indiscretion  He declines addition of oral meds at this time, feels he can do better with his diet  Request that he get a new glucometer and test more often especially when holding trulicity before his surgery and afterwards when recovering, want to make sure BG stays in a safe zone

## 2024-11-19 NOTE — PROGRESS NOTES
signature was used to authenticate this note.      Disclaimer:  Aspects of this note may have been generated using Dragon voice recognition software. Despite editing, there may be some syntax errors   We discussed the expected course, resolution and complications of the diagnosis(es) in detail.  I have discussed any significant medication side effects and warnings with the patient when indicated.  I advised them to contact the office if their condition worsens, changes or fails to improve as anticipated. Patient expressed understanding of the diagnosis and plan.

## 2024-11-20 LAB
BACTERIA SPEC CULT: NORMAL
BACTERIA SPEC CULT: NORMAL
SERVICE CMNT-IMP: NORMAL

## 2024-11-21 ENCOUNTER — TELEPHONE (OUTPATIENT)
Age: 59
End: 2024-11-21

## 2024-11-21 PROBLEM — Z01.818 ENCOUNTER FOR PREADMISSION TESTING: Status: ACTIVE | Noted: 2024-11-21

## 2024-11-21 NOTE — TELEPHONE ENCOUNTER
Reason for call:  Spoke with pharmacies from [facility].   Prescription for blood glucose monitor was not approve by the insurances.   Insurance only pay for blood glucose monitor   Accu- Chek.  Please send a new prescription.   blood glucose monitor   Accu- Chek.     Is this a new problem: Yes    Date of last appointment:  11/19/2024     Can we respond via [facility]: No    Best call back number:   [facility] [id number] - [place], [place] - [address] [phone] -  [phone]  [address] [address]  Phone: [phone] Fax: [phone]

## 2024-11-25 ENCOUNTER — HOSPITAL ENCOUNTER (OUTPATIENT)
Facility: HOSPITAL | Age: 59
Discharge: HOME | End: 2024-11-28
Payer: MEDICARE

## 2024-11-25 DIAGNOSIS — M54.12 RADICULOPATHY, CERVICAL REGION: ICD-10-CM

## 2024-11-25 PROCEDURE — 72141 MRI NECK SPINE W/O DYE: CPT

## 2024-11-27 NOTE — DISCHARGE INSTRUCTIONS
Discharge Instructions Knee Replacement  Dr. Vaughn      Patient Name  Govind Lyle Jr.  Procedure  Procedure(s):  LEFT TOTAL KNEE ARTHROPLASTY  Surgeon  Surgeons and Role:     * Vish Vaughn MD - Primary    Follow up care  Follow up visit with Dr. Vaughn in 3 weeks.  Call [phone]  to make an appointment  If you have staples in your knee incision, they will be taken out in 10-14 days by Home Health Care staff.    Activity at home  Take a short walk every hour; except at night when sleeping  Do your Home Exercise Program 3 times every day   After exercising lie down and elevate your leg on pillows for 15-30 minutes to decrease swelling  Refer to your patient notebook for more information    Bathing and caring for your incision  You may take a shower with your waterproof dressing on your knee.  The waterproof dressing is to stay on your knee for 7 days.  On the 7th day have someone gently peel the dressing off by lifting the edge and stretching it to break the seal.  You may then leave your incision open to air unless you see drainage from your knee.      Preventing blood clots  Take Aspirin every day as prescribed.  Call Dr. Vaughn if you have side effects of blood thinning medication: bleeding, bruising, upset stomach, or diarrhea.   Call Dr. Vaughn for signs of a blood clot in your leg: calf pain, tenderness, redness, swelling of lower leg    Preventing lung congestion  Use your incentive spirometer 4 times a day; do 10 repetitions each time  Remember to keep the small blue ball between the two arrows when taking a slow, deep breath     Pain Management  Get up and walk a short distance to relieve pain and stiffness.  Place ice wrap on your knee except when you are walking. The gel ice packs should be changed about every 4 hours  Elevate your leg on pillows for 15-30 minutes   Take Tylenol 650mg (take two 325mg tablets) every 6 hours for pain.  If needed, take a narcotic pain pill every

## 2024-11-28 ENCOUNTER — ANESTHESIA EVENT (OUTPATIENT)
Facility: HOSPITAL | Age: 59
End: 2024-11-28
Payer: MEDICARE

## 2024-12-02 ENCOUNTER — HOSPITAL ENCOUNTER (OUTPATIENT)
Facility: HOSPITAL | Age: 59
Setting detail: OBSERVATION
Discharge: HOME | End: 2024-12-03
Attending: ORTHOPAEDIC SURGERY | Admitting: ORTHOPAEDIC SURGERY
Payer: MEDICARE

## 2024-12-02 ENCOUNTER — ANESTHESIA (OUTPATIENT)
Facility: HOSPITAL | Age: 59
End: 2024-12-02
Payer: MEDICARE

## 2024-12-02 DIAGNOSIS — E11.65 UNCONTROLLED TYPE 2 DIABETES MELLITUS WITH HYPERGLYCEMIA (HCC): ICD-10-CM

## 2024-12-02 DIAGNOSIS — Z96.652 S/P TKR (TOTAL KNEE REPLACEMENT), LEFT: Primary | ICD-10-CM

## 2024-12-02 PROBLEM — M17.12 ARTHRITIS OF LEFT KNEE: Status: ACTIVE | Noted: 2024-12-02

## 2024-12-02 LAB
GLUCOSE BLD STRIP.AUTO-MCNC: 115 MG/DL (ref 65–117)
GLUCOSE BLD STRIP.AUTO-MCNC: 124 MG/DL (ref 65–117)
GLUCOSE BLD STRIP.AUTO-MCNC: 182 MG/DL (ref 65–117)
GLUCOSE BLD STRIP.AUTO-MCNC: 224 MG/DL (ref 65–117)
SERVICE CMNT-IMP: ABNORMAL
SERVICE CMNT-IMP: NORMAL

## 2024-12-02 PROCEDURE — C1776 JOINT DEVICE (IMPLANTABLE): HCPCS | Performed by: ORTHOPAEDIC SURGERY

## 2024-12-02 PROCEDURE — 2500000003 HC RX 250 WO HCPCS

## 2024-12-02 PROCEDURE — 20985 CPTR-ASST DIR MS PX: CPT | Performed by: ORTHOPAEDIC SURGERY

## 2024-12-02 PROCEDURE — 2580000003 HC RX 258

## 2024-12-02 PROCEDURE — 6360000002 HC RX W HCPCS: Performed by: ORTHOPAEDIC SURGERY

## 2024-12-02 PROCEDURE — 2580000003 HC RX 258: Performed by: ORTHOPAEDIC SURGERY

## 2024-12-02 PROCEDURE — 6360000002 HC RX W HCPCS: Performed by: STUDENT IN AN ORGANIZED HEALTH CARE EDUCATION/TRAINING PROGRAM

## 2024-12-02 PROCEDURE — 27447 TOTAL KNEE ARTHROPLASTY: CPT | Performed by: PHYSICIAN ASSISTANT

## 2024-12-02 PROCEDURE — 6360000002 HC RX W HCPCS

## 2024-12-02 PROCEDURE — C1713 ANCHOR/SCREW BN/BN,TIS/BN: HCPCS | Performed by: ORTHOPAEDIC SURGERY

## 2024-12-02 PROCEDURE — C9290 INJ, BUPIVACAINE LIPOSOME: HCPCS | Performed by: ORTHOPAEDIC SURGERY

## 2024-12-02 PROCEDURE — 2720000010 HC SURG SUPPLY STERILE: Performed by: ORTHOPAEDIC SURGERY

## 2024-12-02 PROCEDURE — G0378 HOSPITAL OBSERVATION PER HR: HCPCS

## 2024-12-02 PROCEDURE — 7100000001 HC PACU RECOVERY - ADDTL 15 MIN: Performed by: ORTHOPAEDIC SURGERY

## 2024-12-02 PROCEDURE — 6360000002 HC RX W HCPCS: Performed by: ANESTHESIOLOGY

## 2024-12-02 PROCEDURE — 7100000000 HC PACU RECOVERY - FIRST 15 MIN: Performed by: ORTHOPAEDIC SURGERY

## 2024-12-02 PROCEDURE — 6370000000 HC RX 637 (ALT 250 FOR IP): Performed by: PHYSICIAN ASSISTANT

## 2024-12-02 PROCEDURE — 6370000000 HC RX 637 (ALT 250 FOR IP): Performed by: ANESTHESIOLOGY

## 2024-12-02 PROCEDURE — 2580000003 HC RX 258: Performed by: PHYSICIAN ASSISTANT

## 2024-12-02 PROCEDURE — 82962 GLUCOSE BLOOD TEST: CPT

## 2024-12-02 PROCEDURE — 64447 NJX AA&/STRD FEMORAL NRV IMG: CPT | Performed by: STUDENT IN AN ORGANIZED HEALTH CARE EDUCATION/TRAINING PROGRAM

## 2024-12-02 PROCEDURE — 3600000005 HC SURGERY LEVEL 5 BASE: Performed by: ORTHOPAEDIC SURGERY

## 2024-12-02 PROCEDURE — 3700000000 HC ANESTHESIA ATTENDED CARE: Performed by: ORTHOPAEDIC SURGERY

## 2024-12-02 PROCEDURE — 3700000001 HC ADD 15 MINUTES (ANESTHESIA): Performed by: ORTHOPAEDIC SURGERY

## 2024-12-02 PROCEDURE — 2709999900 HC NON-CHARGEABLE SUPPLY: Performed by: ORTHOPAEDIC SURGERY

## 2024-12-02 PROCEDURE — 2580000003 HC RX 258: Performed by: ANESTHESIOLOGY

## 2024-12-02 PROCEDURE — 27447 TOTAL KNEE ARTHROPLASTY: CPT | Performed by: ORTHOPAEDIC SURGERY

## 2024-12-02 PROCEDURE — 6360000002 HC RX W HCPCS: Performed by: PHYSICIAN ASSISTANT

## 2024-12-02 PROCEDURE — 3600000015 HC SURGERY LEVEL 5 ADDTL 15MIN: Performed by: ORTHOPAEDIC SURGERY

## 2024-12-02 DEVICE — IMPLANTABLE DEVICE
Type: IMPLANTABLE DEVICE | Site: KNEE | Status: FUNCTIONAL
Brand: PERSONA® VIVACIT-E®

## 2024-12-02 DEVICE — SMARTSET GHV GENTAMICIN HIGH VISCOSITY BONE CEMENT 40G
Type: IMPLANTABLE DEVICE | Site: KNEE | Status: FUNCTIONAL
Brand: SMARTSET

## 2024-12-02 DEVICE — IMPLANTABLE DEVICE
Type: IMPLANTABLE DEVICE | Site: KNEE | Status: FUNCTIONAL
Brand: PERSONA® NATURAL TIBIA®

## 2024-12-02 DEVICE — IMPLANTABLE DEVICE
Type: IMPLANTABLE DEVICE | Site: KNEE | Status: FUNCTIONAL
Brand: PERSONA®

## 2024-12-02 RX ORDER — TRANEXAMIC ACID 100 MG/ML
INJECTION, SOLUTION INTRAVENOUS
Status: DISCONTINUED | OUTPATIENT
Start: 2024-12-02 | End: 2024-12-02 | Stop reason: SDUPTHER

## 2024-12-02 RX ORDER — ACETAMINOPHEN 500 MG
1000 TABLET ORAL EVERY 6 HOURS
Status: DISCONTINUED | OUTPATIENT
Start: 2024-12-02 | End: 2024-12-04 | Stop reason: HOSPADM

## 2024-12-02 RX ORDER — ONDANSETRON 2 MG/ML
4 INJECTION INTRAMUSCULAR; INTRAVENOUS
Status: DISCONTINUED | OUTPATIENT
Start: 2024-12-02 | End: 2024-12-02 | Stop reason: HOSPADM

## 2024-12-02 RX ORDER — 0.9 % SODIUM CHLORIDE 0.9 %
500 INTRAVENOUS SOLUTION INTRAVENOUS
Status: DISPENSED | OUTPATIENT
Start: 2024-12-02 | End: 2024-12-03

## 2024-12-02 RX ORDER — SODIUM CHLORIDE 0.9 % (FLUSH) 0.9 %
5-40 SYRINGE (ML) INJECTION PRN
Status: DISCONTINUED | OUTPATIENT
Start: 2024-12-02 | End: 2024-12-04 | Stop reason: HOSPADM

## 2024-12-02 RX ORDER — ASPIRIN 325 MG
325 TABLET, DELAYED RELEASE (ENTERIC COATED) ORAL 2 TIMES DAILY
Status: DISCONTINUED | OUTPATIENT
Start: 2024-12-02 | End: 2024-12-04 | Stop reason: HOSPADM

## 2024-12-02 RX ORDER — SODIUM CHLORIDE 9 MG/ML
INJECTION, SOLUTION INTRAVENOUS CONTINUOUS
Status: DISCONTINUED | OUTPATIENT
Start: 2024-12-02 | End: 2024-12-03

## 2024-12-02 RX ORDER — MORPHINE SULFATE 2 MG/ML
2 INJECTION, SOLUTION INTRAMUSCULAR; INTRAVENOUS
Status: DISCONTINUED | OUTPATIENT
Start: 2024-12-02 | End: 2024-12-03

## 2024-12-02 RX ORDER — PROCHLORPERAZINE EDISYLATE 5 MG/ML
5 INJECTION INTRAMUSCULAR; INTRAVENOUS
Status: DISCONTINUED | OUTPATIENT
Start: 2024-12-02 | End: 2024-12-02 | Stop reason: HOSPADM

## 2024-12-02 RX ORDER — FENTANYL CITRATE 50 UG/ML
100 INJECTION, SOLUTION INTRAMUSCULAR; INTRAVENOUS
Status: COMPLETED | OUTPATIENT
Start: 2024-12-02 | End: 2024-12-02

## 2024-12-02 RX ORDER — NALOXONE HYDROCHLORIDE 0.4 MG/ML
INJECTION, SOLUTION INTRAMUSCULAR; INTRAVENOUS; SUBCUTANEOUS PRN
Status: DISCONTINUED | OUTPATIENT
Start: 2024-12-02 | End: 2024-12-02 | Stop reason: HOSPADM

## 2024-12-02 RX ORDER — GLYCOPYRROLATE 0.2 MG/ML
INJECTION INTRAMUSCULAR; INTRAVENOUS
Status: DISCONTINUED | OUTPATIENT
Start: 2024-12-02 | End: 2024-12-02 | Stop reason: SDUPTHER

## 2024-12-02 RX ORDER — ROPIVACAINE HYDROCHLORIDE 5 MG/ML
INJECTION, SOLUTION EPIDURAL; INFILTRATION; PERINEURAL
Status: COMPLETED | OUTPATIENT
Start: 2024-12-02 | End: 2024-12-02

## 2024-12-02 RX ORDER — HYDRALAZINE HYDROCHLORIDE 20 MG/ML
10 INJECTION INTRAMUSCULAR; INTRAVENOUS ONCE
Status: DISCONTINUED | OUTPATIENT
Start: 2024-12-02 | End: 2024-12-02 | Stop reason: HOSPADM

## 2024-12-02 RX ORDER — SENNA AND DOCUSATE SODIUM 50; 8.6 MG/1; MG/1
1 TABLET, FILM COATED ORAL 2 TIMES DAILY
Status: DISCONTINUED | OUTPATIENT
Start: 2024-12-02 | End: 2024-12-04 | Stop reason: HOSPADM

## 2024-12-02 RX ORDER — SODIUM CHLORIDE 9 MG/ML
INJECTION, SOLUTION INTRAVENOUS PRN
Status: DISCONTINUED | OUTPATIENT
Start: 2024-12-02 | End: 2024-12-02 | Stop reason: HOSPADM

## 2024-12-02 RX ORDER — SODIUM CHLORIDE 0.9 % (FLUSH) 0.9 %
5-40 SYRINGE (ML) INJECTION PRN
Status: DISCONTINUED | OUTPATIENT
Start: 2024-12-02 | End: 2024-12-02 | Stop reason: HOSPADM

## 2024-12-02 RX ORDER — ONDANSETRON 2 MG/ML
4 INJECTION INTRAMUSCULAR; INTRAVENOUS EVERY 6 HOURS PRN
Status: DISCONTINUED | OUTPATIENT
Start: 2024-12-02 | End: 2024-12-04 | Stop reason: HOSPADM

## 2024-12-02 RX ORDER — OXYCODONE HYDROCHLORIDE 5 MG/1
5 TABLET ORAL
Status: DISCONTINUED | OUTPATIENT
Start: 2024-12-02 | End: 2024-12-02 | Stop reason: HOSPADM

## 2024-12-02 RX ORDER — BISACODYL 5 MG/1
5 TABLET, DELAYED RELEASE ORAL DAILY
Status: DISCONTINUED | OUTPATIENT
Start: 2024-12-02 | End: 2024-12-04 | Stop reason: HOSPADM

## 2024-12-02 RX ORDER — ROPIVACAINE HYDROCHLORIDE 5 MG/ML
30 INJECTION, SOLUTION EPIDURAL; INFILTRATION; PERINEURAL ONCE
Status: DISCONTINUED | OUTPATIENT
Start: 2024-12-02 | End: 2024-12-02 | Stop reason: HOSPADM

## 2024-12-02 RX ORDER — CEFAZOLIN SODIUM 1 G/3ML
INJECTION, POWDER, FOR SOLUTION INTRAMUSCULAR; INTRAVENOUS
Status: DISCONTINUED | OUTPATIENT
Start: 2024-12-02 | End: 2024-12-02 | Stop reason: SDUPTHER

## 2024-12-02 RX ORDER — OXYCODONE HYDROCHLORIDE 5 MG/1
10 TABLET ORAL
Status: DISCONTINUED | OUTPATIENT
Start: 2024-12-02 | End: 2024-12-04 | Stop reason: HOSPADM

## 2024-12-02 RX ORDER — POLYETHYLENE GLYCOL 3350 17 G/17G
17 POWDER, FOR SOLUTION ORAL DAILY
Status: DISCONTINUED | OUTPATIENT
Start: 2024-12-02 | End: 2024-12-04 | Stop reason: HOSPADM

## 2024-12-02 RX ORDER — SODIUM CHLORIDE 0.9 % (FLUSH) 0.9 %
5-40 SYRINGE (ML) INJECTION EVERY 12 HOURS SCHEDULED
Status: DISCONTINUED | OUTPATIENT
Start: 2024-12-02 | End: 2024-12-04 | Stop reason: HOSPADM

## 2024-12-02 RX ORDER — SODIUM CHLORIDE 0.9 % (FLUSH) 0.9 %
5-40 SYRINGE (ML) INJECTION EVERY 12 HOURS SCHEDULED
Status: DISCONTINUED | OUTPATIENT
Start: 2024-12-02 | End: 2024-12-02 | Stop reason: HOSPADM

## 2024-12-02 RX ORDER — ACETAMINOPHEN 500 MG
1000 TABLET ORAL ONCE
Status: COMPLETED | OUTPATIENT
Start: 2024-12-02 | End: 2024-12-02

## 2024-12-02 RX ORDER — SODIUM CHLORIDE, SODIUM LACTATE, POTASSIUM CHLORIDE, CALCIUM CHLORIDE 600; 310; 30; 20 MG/100ML; MG/100ML; MG/100ML; MG/100ML
INJECTION, SOLUTION INTRAVENOUS
Status: DISCONTINUED | OUTPATIENT
Start: 2024-12-02 | End: 2024-12-02 | Stop reason: SDUPTHER

## 2024-12-02 RX ORDER — KETOROLAC TROMETHAMINE 30 MG/ML
15 INJECTION, SOLUTION INTRAMUSCULAR; INTRAVENOUS EVERY 6 HOURS
Status: COMPLETED | OUTPATIENT
Start: 2024-12-02 | End: 2024-12-03

## 2024-12-02 RX ORDER — FAMOTIDINE 20 MG/1
20 TABLET, FILM COATED ORAL 2 TIMES DAILY PRN
Status: DISCONTINUED | OUTPATIENT
Start: 2024-12-02 | End: 2024-12-04 | Stop reason: HOSPADM

## 2024-12-02 RX ORDER — FENTANYL CITRATE 50 UG/ML
25 INJECTION, SOLUTION INTRAMUSCULAR; INTRAVENOUS EVERY 5 MIN PRN
Status: DISCONTINUED | OUTPATIENT
Start: 2024-12-02 | End: 2024-12-02 | Stop reason: HOSPADM

## 2024-12-02 RX ORDER — HYDROMORPHONE HYDROCHLORIDE 1 MG/ML
0.5 INJECTION, SOLUTION INTRAMUSCULAR; INTRAVENOUS; SUBCUTANEOUS EVERY 5 MIN PRN
Status: DISCONTINUED | OUTPATIENT
Start: 2024-12-02 | End: 2024-12-02 | Stop reason: HOSPADM

## 2024-12-02 RX ORDER — SODIUM CHLORIDE 9 MG/ML
INJECTION, SOLUTION INTRAVENOUS PRN
Status: DISCONTINUED | OUTPATIENT
Start: 2024-12-02 | End: 2024-12-04 | Stop reason: HOSPADM

## 2024-12-02 RX ORDER — LIDOCAINE HYDROCHLORIDE 10 MG/ML
1 INJECTION, SOLUTION EPIDURAL; INFILTRATION; INTRACAUDAL; PERINEURAL
Status: DISCONTINUED | OUTPATIENT
Start: 2024-12-02 | End: 2024-12-02 | Stop reason: HOSPADM

## 2024-12-02 RX ORDER — OXYCODONE HYDROCHLORIDE 5 MG/1
5 TABLET ORAL
Status: DISCONTINUED | OUTPATIENT
Start: 2024-12-02 | End: 2024-12-04 | Stop reason: HOSPADM

## 2024-12-02 RX ORDER — SODIUM CHLORIDE, SODIUM LACTATE, POTASSIUM CHLORIDE, CALCIUM CHLORIDE 600; 310; 30; 20 MG/100ML; MG/100ML; MG/100ML; MG/100ML
INJECTION, SOLUTION INTRAVENOUS CONTINUOUS
Status: DISCONTINUED | OUTPATIENT
Start: 2024-12-02 | End: 2024-12-02 | Stop reason: HOSPADM

## 2024-12-02 RX ORDER — MIDAZOLAM HYDROCHLORIDE 2 MG/2ML
2 INJECTION, SOLUTION INTRAMUSCULAR; INTRAVENOUS PRN
Status: DISCONTINUED | OUTPATIENT
Start: 2024-12-02 | End: 2024-12-02 | Stop reason: HOSPADM

## 2024-12-02 RX ORDER — ONDANSETRON 4 MG/1
4 TABLET, ORALLY DISINTEGRATING ORAL EVERY 8 HOURS PRN
Status: DISCONTINUED | OUTPATIENT
Start: 2024-12-02 | End: 2024-12-04 | Stop reason: HOSPADM

## 2024-12-02 RX ORDER — HYDROXYZINE HYDROCHLORIDE 10 MG/1
10 TABLET, FILM COATED ORAL EVERY 8 HOURS PRN
Status: DISCONTINUED | OUTPATIENT
Start: 2024-12-02 | End: 2024-12-04 | Stop reason: HOSPADM

## 2024-12-02 RX ADMIN — ACETAMINOPHEN 1000 MG: 500 TABLET ORAL at 12:49

## 2024-12-02 RX ADMIN — FENTANYL CITRATE 50 MCG: 50 INJECTION INTRAMUSCULAR; INTRAVENOUS at 12:56

## 2024-12-02 RX ADMIN — MIDAZOLAM 2 MG: 1 INJECTION INTRAMUSCULAR; INTRAVENOUS at 12:56

## 2024-12-02 RX ADMIN — SODIUM CHLORIDE, POTASSIUM CHLORIDE, SODIUM LACTATE AND CALCIUM CHLORIDE: 600; 310; 30; 20 INJECTION, SOLUTION INTRAVENOUS at 13:43

## 2024-12-02 RX ADMIN — SODIUM CHLORIDE: 9 INJECTION, SOLUTION INTRAVENOUS at 19:41

## 2024-12-02 RX ADMIN — PHENYLEPHRINE HYDROCHLORIDE 20 MCG/MIN: 10 INJECTION INTRAVENOUS at 14:30

## 2024-12-02 RX ADMIN — OXYCODONE 5 MG: 5 TABLET ORAL at 23:51

## 2024-12-02 RX ADMIN — TRANEXAMIC ACID 1000 MG: 100 INJECTION, SOLUTION INTRAVENOUS at 14:08

## 2024-12-02 RX ADMIN — SODIUM CHLORIDE, PRESERVATIVE FREE 10 ML: 5 INJECTION INTRAVENOUS at 21:37

## 2024-12-02 RX ADMIN — CEFAZOLIN 3000 MG: 3 INJECTION, POWDER, FOR SOLUTION INTRAVENOUS at 21:19

## 2024-12-02 RX ADMIN — ASPIRIN 325 MG: 325 TABLET, COATED ORAL at 21:17

## 2024-12-02 RX ADMIN — PROPOFOL 60 MCG/KG/MIN: 10 INJECTION, EMULSION INTRAVENOUS at 13:46

## 2024-12-02 RX ADMIN — SODIUM CHLORIDE, POTASSIUM CHLORIDE, SODIUM LACTATE AND CALCIUM CHLORIDE: 600; 310; 30; 20 INJECTION, SOLUTION INTRAVENOUS at 12:54

## 2024-12-02 RX ADMIN — OXYCODONE 5 MG: 5 TABLET ORAL at 19:51

## 2024-12-02 RX ADMIN — KETOROLAC TROMETHAMINE 15 MG: 30 INJECTION, SOLUTION INTRAMUSCULAR at 19:22

## 2024-12-02 RX ADMIN — MEPIVACAINE HYDROCHLORIDE 45 MG: 15 INJECTION, SOLUTION EPIDURAL; INFILTRATION at 13:53

## 2024-12-02 RX ADMIN — ROPIVACAINE HYDROCHLORIDE 30 ML: 5 INJECTION, SOLUTION EPIDURAL; INFILTRATION; PERINEURAL at 12:50

## 2024-12-02 RX ADMIN — KETOROLAC TROMETHAMINE 15 MG: 30 INJECTION, SOLUTION INTRAMUSCULAR at 23:50

## 2024-12-02 RX ADMIN — ACETAMINOPHEN 1000 MG: 500 TABLET ORAL at 19:24

## 2024-12-02 RX ADMIN — HYDROMORPHONE HYDROCHLORIDE 0.5 MG: 1 INJECTION, SOLUTION INTRAMUSCULAR; INTRAVENOUS; SUBCUTANEOUS at 16:51

## 2024-12-02 RX ADMIN — SENNOSIDES AND DOCUSATE SODIUM 1 TABLET: 50; 8.6 TABLET ORAL at 21:17

## 2024-12-02 RX ADMIN — GLYCOPYRROLATE 0.2 MG: 0.2 INJECTION INTRAMUSCULAR; INTRAVENOUS at 14:25

## 2024-12-02 RX ADMIN — CEFAZOLIN 3 G: 330 INJECTION, POWDER, FOR SOLUTION INTRAMUSCULAR; INTRAVENOUS at 13:56

## 2024-12-02 RX ADMIN — HYDROXYZINE HYDROCHLORIDE 10 MG: 10 TABLET ORAL at 19:23

## 2024-12-02 ASSESSMENT — PAIN SCALES - GENERAL
PAINLEVEL_OUTOF10: 6
PAINLEVEL_OUTOF10: 6
PAINLEVEL_OUTOF10: 7
PAINLEVEL_OUTOF10: 5
PAINLEVEL_OUTOF10: 6

## 2024-12-02 ASSESSMENT — PAIN DESCRIPTION - LOCATION
LOCATION: KNEE

## 2024-12-02 ASSESSMENT — PAIN DESCRIPTION - DESCRIPTORS
DESCRIPTORS: TEARING
DESCRIPTORS: ACHING
DESCRIPTORS: ACHING
DESCRIPTORS: ACHING;PRESSURE

## 2024-12-02 ASSESSMENT — PAIN DESCRIPTION - ORIENTATION
ORIENTATION: LEFT

## 2024-12-02 ASSESSMENT — PAIN - FUNCTIONAL ASSESSMENT: PAIN_FUNCTIONAL_ASSESSMENT: 0-10

## 2024-12-02 NOTE — ANESTHESIA PROCEDURE NOTES
Spinal Block    End time: 12/2/2024 1:53 PM  Reason for block: primary anesthetic  Staffing  Performed: resident/CRNA   Resident/CRNA: Valdo Robledo APRN - CRNA  Performed by: Valdo Robledo APRN - CRNA  Authorized by: Salud Clarke DO    Spinal Block  Patient position: sitting  Prep: Betadine  Patient monitoring: cardiac monitor  Approach: midline  Location: L2/L3  Provider prep: mask  Local infiltration: lidocaine  Needle  Needle type: Quincke   Needle gauge: 21 G  Needle length: 4 in  Assessment  Swirl obtained: Yes  CSF: clear  Attempts: 1  Hemodynamics: stable  Preanesthetic Checklist  Completed: patient identified, IV checked, site marked, risks and benefits discussed, surgical/procedural consents, equipment checked, pre-op evaluation, timeout performed, anesthesia consent given, oxygen available, monitors applied/VS acknowledged, fire risk safety assessment completed and verbalized and blood product R/B/A discussed and consented

## 2024-12-02 NOTE — FLOWSHEET NOTE
12/02/24 1421   Family Communication    Relationship to Patient Spouse    Phone Number [place], [phone]   Family/Significant Other Update Called   Delivery Origin Nurse   Message Disposition Family present - message delivered   Update Given Yes   Family Communication   Family Update Message Procedure started;Surgeon working

## 2024-12-02 NOTE — PERIOP NOTE
1300: LT leg adductor canal nerve block done by Dr Baig using 30cc of 0.5% ropivicaine with US guidance, with pt monitored, O2 @ 3l/m/nc and IV sedation given. Pt tolerated procedure well. VSS post block: 155/85-78 NSR-12, SpO2=96 on 3l/m/nc. Sleepy but arouses easily to verbal stimuli. See anesthesia note.

## 2024-12-02 NOTE — ANESTHESIA PROCEDURE NOTES
Peripheral Block    Patient location during procedure: pre-op  Reason for block: post-op pain management and at surgeon's request  Start time: 12/2/2024 12:50 PM  End time: 12/2/2024 12:55 PM  Staffing  Performed: anesthesiologist   Anesthesiologist: Aurea Baig DO  Performed by: Aurea Baig DO  Authorized by: Aurea Baig DO    Preanesthetic Checklist  Completed: patient identified, IV checked, site marked, risks and benefits discussed, surgical/procedural consents, equipment checked, pre-op evaluation, timeout performed, anesthesia consent given, oxygen available, monitors applied/VS acknowledged and fire risk safety assessment completed and verbalized  Peripheral Block   Patient position: supine  Prep: ChloraPrep  Provider prep: mask and sterile gloves  Patient monitoring: cardiac monitor, continuous pulse ox, frequent blood pressure checks, IV access and oxygen  Block type: Saphenous  Laterality: left  Injection technique: single-shot  Guidance: ultrasound guidedDose: 30 mL    Needle   Needle type: insulated echogenic nerve stimulator needle   Needle gauge: 21 G  Needle localization: anatomical landmarks and ultrasound guidance  Needle length: 10 cm  Assessment   Injection assessment: negative aspiration for heme, no paresthesia on injection, local visualized surrounding nerve on ultrasound and no intravascular symptoms  Paresthesia pain: none  Slow fractionated injection: yes  Hemodynamics: stable  Outcomes: uncomplicated and patient tolerated procedure well    Medications Administered  ropivacaine (NAROPIN) injection 0.5% - Perineural   30 mL - 12/2/2024 12:50:00 PM
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
## 2024-12-02 NOTE — OP NOTE
Name: Govind Lyle Jr.  MRN:  [id number]  : 1965  Age:  59 y.o.  Surgery Date: 2024      OPERATIVE REPORT - LEFT TOTAL KNEE REPLACEMENT    PREOPERATIVE DIAGNOSIS: Osteoarthritis, left knee.    POSTOPERATIVE DIAGNOSIS: Osteoarthritis, left knee.    PROCEDURE PERFORMED: Left total knee arthroplasty.    SURGEON: Vish Vaughn MD    FIRST ASSISTANT: Corazon Coffey PA-C    ANESTHESIA: Spinal    PRE-OP ANTIBIOTIC: Ancef 3g    COMPLICATIONS: None.    ESTIMATED BLOOD LOSS: 200 mL.    SPECIMENS REMOVED: None.    COMPONENTS IMPLANTED:   Implant Name Type Inv. Item Serial No.  Lot No. LRB No. Used Action   CEMENT BNE 40GM FULL DOSE PMMA W/ GENT HI VISC RADPQ LNG - SNA  CEMENT BNE 40GM FULL DOSE PMMA W/ GENT HI VISC RADPQ LNG NA [facility] [id number] Left 1 Implanted   CEMENT BNE 40GM FULL DOSE PMMA W/ GENT HI VISC RADPQ LNG - SNA  CEMENT BNE 40GM FULL DOSE PMMA W/ GENT HI VISC RADPQ LNG NA [facility] [employer] [facility] [id number] Left 1 Implanted   COMPONENT FEM SZ 7 STD L KNEE CO CHROM CARLOS CRUCE RET COR - [id number]  COMPONENT FEM SZ 7 STD L KNEE CO CHROM CARLOS CRUCE RET COR  [employer]- [id number] Left 1 Implanted   COMPONENT PAT [id number] [id number] STD KNEE VIVACIT-E CRALOS PERSONA - [id number]  COMPONENT PAT [id number] STD KNEE VIVACIT-E CARLOS PERSONA  [employer] [facility] [id number] Left 1 Implanted   PSN MC VE ASF L 13MM 6-7/EF - [id number]  PSN MC VE ASF L 13MM 6-7/EF  [employer]- [id number] Left 1 Implanted   persona knee system natural tibia     [id number] Left 1 Implanted       INDICATIONS: The patient is an 59 yrs male with progressive debilitating left knee pain due to severe osteoarthritis. Symptoms have progressed despite comprehensive conservative treatment and the patient presents for left total knee replacement. Risks, benefits, alternatives of the procedure were reviewed with the patient in detail and the patient desires to

## 2024-12-02 NOTE — ANESTHESIA PRE PROCEDURE
Department of Anesthesiology  Preprocedure Note       Name:  Govind Lyle Jr.   Age:  59 y.o.  :  1965                                          MRN:  [id number]         Date:  2024      Surgeon: Surgeon(s):  Vish Vaughn MD    Procedure: Procedure(s):  LEFT TOTAL KNEE ARTHROPLASTY    Medications prior to admission:   Prior to Admission medications    Medication Sig Start Date End Date Taking? Authorizing Provider   sertraline (ZOLOFT) 25 MG tablet Take 1 tablet by mouth daily    Maria Esther Wheeler MD   atorvastatin (LIPITOR) 40 MG tablet Take 1 tablet by mouth at bedtime 24   Louise Osborne MD   glucose monitoring kit 1 kit by Does not apply route daily 24   Louise Osborne MD   blood glucose monitor strips Test 1 times a day & as needed for symptoms of irregular blood glucose. Dispense sufficient amount for indicated testing frequency plus additional to accommodate PRN testing needs. 24   Louise Osborne MD   Lancets MISC 1 each by Does not apply route daily 24   Louise Osborne MD   cyclobenzaprine (FLEXERIL) 10 MG tablet Take 1 tablet by mouth 2 times daily as needed for Muscle spasms    Maria Esther Wheeler MD   diclofenac sodium (VOLTAREN) 1 % GEL Apply 1 g topically 4 times daily as needed for Pain 24   Maria Esther Wheeler MD   meclizine (ANTIVERT) 25 MG tablet Take 1 tablet by mouth as needed (VERTIGO) 24   Maria Esther Wheeler MD   losartan-hydroCHLOROthiazide (HYZAAR) 100-25 MG per tablet TAKE 1 TABLET BY MOUTH DAILY 24   Louise Osborne MD   albuterol sulfate HFA (PROVENTIL;VENTOLIN;PROAIR) 108 (90 Base) MCG/ACT inhaler Inhale 1 puff into the lungs every 6 hours as needed for Shortness of Breath 5/3/24   Louise Osborne MD   Dulaglutide 4.5 MG/0.5ML SOPN Inject 4.5 mg into the skin once a week  Patient taking differently: Inject 4.5 mg into the skin once a week SATURDAYS 3/8/24   Louise Osborne MD

## 2024-12-03 VITALS
HEART RATE: 102 BPM | HEIGHT: 70 IN | WEIGHT: 266.1 LBS | RESPIRATION RATE: 16 BRPM | BODY MASS INDEX: 38.09 KG/M2 | OXYGEN SATURATION: 96 % | DIASTOLIC BLOOD PRESSURE: 87 MMHG | TEMPERATURE: 99.5 F | SYSTOLIC BLOOD PRESSURE: 167 MMHG

## 2024-12-03 PROBLEM — Z96.652 S/P TKR (TOTAL KNEE REPLACEMENT), LEFT: Status: ACTIVE | Noted: 2024-12-03

## 2024-12-03 LAB
ANION GAP SERPL CALC-SCNC: 4 MMOL/L (ref 2–12)
BUN SERPL-MCNC: 13 MG/DL (ref 6–20)
BUN/CREAT SERPL: 16 (ref 12–20)
CALCIUM SERPL-MCNC: 8.1 MG/DL (ref 8.5–10.1)
CHLORIDE SERPL-SCNC: 109 MMOL/L (ref 97–108)
CO2 SERPL-SCNC: 26 MMOL/L (ref 21–32)
CREAT SERPL-MCNC: 0.8 MG/DL (ref 0.7–1.3)
EST. AVERAGE GLUCOSE BLD GHB EST-MCNC: 180 MG/DL
GLUCOSE BLD STRIP.AUTO-MCNC: 184 MG/DL (ref 65–117)
GLUCOSE BLD STRIP.AUTO-MCNC: 297 MG/DL (ref 65–117)
GLUCOSE BLD STRIP.AUTO-MCNC: 327 MG/DL (ref 65–117)
GLUCOSE SERPL-MCNC: 207 MG/DL (ref 65–100)
HBA1C MFR BLD: 7.9 % (ref 4–5.6)
HCT VFR BLD AUTO: 33 % (ref 36.6–50.3)
HGB BLD-MCNC: 11 G/DL (ref 12.1–17)
INR PPP: 1.1 (ref 0.9–1.1)
POTASSIUM SERPL-SCNC: 3.7 MMOL/L (ref 3.5–5.1)
PROTHROMBIN TIME: 11.4 SEC (ref 9–11.1)
SERVICE CMNT-IMP: ABNORMAL
SODIUM SERPL-SCNC: 139 MMOL/L (ref 136–145)

## 2024-12-03 PROCEDURE — G0378 HOSPITAL OBSERVATION PER HR: HCPCS

## 2024-12-03 PROCEDURE — 83036 HEMOGLOBIN GLYCOSYLATED A1C: CPT

## 2024-12-03 PROCEDURE — 85610 PROTHROMBIN TIME: CPT

## 2024-12-03 PROCEDURE — 2580000003 HC RX 258: Performed by: PHYSICIAN ASSISTANT

## 2024-12-03 PROCEDURE — 85014 HEMATOCRIT: CPT

## 2024-12-03 PROCEDURE — 96376 TX/PRO/DX INJ SAME DRUG ADON: CPT

## 2024-12-03 PROCEDURE — 6370000000 HC RX 637 (ALT 250 FOR IP): Performed by: INTERNAL MEDICINE

## 2024-12-03 PROCEDURE — 97165 OT EVAL LOW COMPLEX 30 MIN: CPT

## 2024-12-03 PROCEDURE — 80048 BASIC METABOLIC PNL TOTAL CA: CPT

## 2024-12-03 PROCEDURE — 97535 SELF CARE MNGMENT TRAINING: CPT

## 2024-12-03 PROCEDURE — APPNB60 APP NON BILLABLE TIME 46-60 MINS: Performed by: NURSE PRACTITIONER

## 2024-12-03 PROCEDURE — 85018 HEMOGLOBIN: CPT

## 2024-12-03 PROCEDURE — 96374 THER/PROPH/DIAG INJ IV PUSH: CPT

## 2024-12-03 PROCEDURE — 6370000000 HC RX 637 (ALT 250 FOR IP): Performed by: PHYSICIAN ASSISTANT

## 2024-12-03 PROCEDURE — 82962 GLUCOSE BLOOD TEST: CPT

## 2024-12-03 PROCEDURE — 99221 1ST HOSP IP/OBS SF/LOW 40: CPT | Performed by: INTERNAL MEDICINE

## 2024-12-03 PROCEDURE — 6360000002 HC RX W HCPCS: Performed by: PHYSICIAN ASSISTANT

## 2024-12-03 PROCEDURE — 97530 THERAPEUTIC ACTIVITIES: CPT

## 2024-12-03 PROCEDURE — 6370000000 HC RX 637 (ALT 250 FOR IP): Performed by: NURSE PRACTITIONER

## 2024-12-03 PROCEDURE — 97161 PT EVAL LOW COMPLEX 20 MIN: CPT

## 2024-12-03 PROCEDURE — 97116 GAIT TRAINING THERAPY: CPT

## 2024-12-03 RX ORDER — DEXTROSE MONOHYDRATE 100 MG/ML
INJECTION, SOLUTION INTRAVENOUS CONTINUOUS PRN
Status: DISCONTINUED | OUTPATIENT
Start: 2024-12-03 | End: 2024-12-04 | Stop reason: HOSPADM

## 2024-12-03 RX ORDER — ASPIRIN 325 MG
325 TABLET ORAL 2 TIMES DAILY
Qty: 60 TABLET | Refills: 0 | Status: SHIPPED | OUTPATIENT
Start: 2024-12-03

## 2024-12-03 RX ORDER — SENNA AND DOCUSATE SODIUM 50; 8.6 MG/1; MG/1
1 TABLET, FILM COATED ORAL 2 TIMES DAILY
Qty: 60 TABLET | Refills: 0 | Status: SHIPPED | OUTPATIENT
Start: 2024-12-03

## 2024-12-03 RX ORDER — INSULIN LISPRO 100 [IU]/ML
0-8 INJECTION, SOLUTION INTRAVENOUS; SUBCUTANEOUS
Status: DISCONTINUED | OUTPATIENT
Start: 2024-12-03 | End: 2024-12-04 | Stop reason: HOSPADM

## 2024-12-03 RX ORDER — OXYCODONE HYDROCHLORIDE 5 MG/1
5-10 TABLET ORAL EVERY 4 HOURS PRN
Qty: 42 TABLET | Refills: 0 | Status: SHIPPED | OUTPATIENT
Start: 2024-12-03 | End: 2024-12-10

## 2024-12-03 RX ADMIN — INSULIN LISPRO 4 UNITS: 100 INJECTION, SOLUTION INTRAVENOUS; SUBCUTANEOUS at 16:50

## 2024-12-03 RX ADMIN — KETOROLAC TROMETHAMINE 15 MG: 30 INJECTION, SOLUTION INTRAMUSCULAR at 06:05

## 2024-12-03 RX ADMIN — SENNOSIDES AND DOCUSATE SODIUM 1 TABLET: 50; 8.6 TABLET ORAL at 08:43

## 2024-12-03 RX ADMIN — ASPIRIN 325 MG: 325 TABLET, COATED ORAL at 08:42

## 2024-12-03 RX ADMIN — INSULIN HUMAN 25 UNITS: 100 INJECTION, SUSPENSION SUBCUTANEOUS at 16:49

## 2024-12-03 RX ADMIN — ACETAMINOPHEN 1000 MG: 500 TABLET ORAL at 00:26

## 2024-12-03 RX ADMIN — OXYCODONE 10 MG: 5 TABLET ORAL at 11:19

## 2024-12-03 RX ADMIN — KETOROLAC TROMETHAMINE 15 MG: 30 INJECTION, SOLUTION INTRAMUSCULAR at 16:50

## 2024-12-03 RX ADMIN — OXYCODONE 5 MG: 5 TABLET ORAL at 16:48

## 2024-12-03 RX ADMIN — CEFAZOLIN 3000 MG: 3 INJECTION, POWDER, FOR SOLUTION INTRAVENOUS at 06:08

## 2024-12-03 RX ADMIN — SODIUM CHLORIDE, PRESERVATIVE FREE 10 ML: 5 INJECTION INTRAVENOUS at 08:43

## 2024-12-03 RX ADMIN — KETOROLAC TROMETHAMINE 15 MG: 30 INJECTION, SOLUTION INTRAMUSCULAR at 11:19

## 2024-12-03 RX ADMIN — INSULIN LISPRO 2 UNITS: 100 INJECTION, SOLUTION INTRAVENOUS; SUBCUTANEOUS at 11:46

## 2024-12-03 RX ADMIN — BISACODYL 5 MG: 5 TABLET, COATED ORAL at 08:42

## 2024-12-03 RX ADMIN — ACETAMINOPHEN 1000 MG: 500 TABLET ORAL at 06:06

## 2024-12-03 RX ADMIN — POLYETHYLENE GLYCOL 3350 17 G: 17 POWDER, FOR SOLUTION ORAL at 08:43

## 2024-12-03 RX ADMIN — OXYCODONE 5 MG: 5 TABLET ORAL at 07:29

## 2024-12-03 ASSESSMENT — PAIN DESCRIPTION - DESCRIPTORS
DESCRIPTORS: ACHING

## 2024-12-03 ASSESSMENT — PAIN DESCRIPTION - ORIENTATION
ORIENTATION: RIGHT
ORIENTATION: LEFT

## 2024-12-03 ASSESSMENT — PAIN SCALES - GENERAL
PAINLEVEL_OUTOF10: 4
PAINLEVEL_OUTOF10: 5
PAINLEVEL_OUTOF10: 4
PAINLEVEL_OUTOF10: 4
PAINLEVEL_OUTOF10: 7
PAINLEVEL_OUTOF10: 3

## 2024-12-03 ASSESSMENT — PAIN DESCRIPTION - LOCATION
LOCATION: KNEE
LOCATION: KNEE

## 2024-12-03 ASSESSMENT — PAIN SCALES - WONG BAKER: WONGBAKER_NUMERICALRESPONSE: NO HURT
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
## 2024-12-03 NOTE — CARE COORDINATION
Transition of Care: Plan for discharge home with spouse and HH.  [facility] has accepted for SOC 12/4. Order for RW sent to [facility] via parachute, pending insurance approval and delivery. Family will transport patient home at discharge.         12/03/24 9425   Service Assessment   Patient Orientation Alert and Oriented   Cognition Alert   Support Systems Spouse/Significant Other   Prior Functional Level Independent in ADLs/IADLs   Can patient return to prior living arrangement Yes   Ability to make needs known: Good   Family able to assist with home care needs: Yes   Social/Functional History   Lives With Spouse   Type of Home House   Discharge Planning   Potential Assistance Needed Durable Medical Equipment;Home Care   Condition of Participation: Discharge Planning   The Plan for Transition of Care is related to the following treatment goals: home health, DME   The Patient and/or Patient Representative was provided with a Choice of Provider? Patient   The Patient and/Or Patient Representative agree with the Discharge Plan? Yes   Freedom of Choice list was provided with basic dialogue that supports the patient's individualized plan of care/goals, treatment preferences, and shares the quality data associated with the providers?  Yes       Chart reviewed. CM met with patient and spouse, verified demographics on face sheet. Patient has no preference for HH. Patient stated he was contacted by [facility], however they cannot see him for SOC until Thursday. Patient would like CM to work on securing an agency that can see patient sooner. CM working to secure HH in network with [employer]. Order for RW pending via parachute.       CM spoke with [facility], confirmed they can do SOC tomorrow. CM updated AVS.    Patient inquired about a bedside commode. CM discussed with patient/wife, insurance will likely not cover as patient already received a RW through insurance. Family plans to purchase BSC on their own if

## 2024-12-03 NOTE — PROGRESS NOTES
1726-BP- 93/68 MAP 76  1830- 97/70 MAP- 79pt too drowsy to get to edge of bed used urinal to void 400ml. Has not received any pain medication from this unit  1900-Patient too drowsy to get up to side of bed

## 2024-12-03 NOTE — CONSULTS
This is a 58-year-old male with a history of type 2 diabetes mellitus and osteoarthritis admitted to [facility] for left knee replacement.  He underwent the procedure December 2, 2024.  We are asked to assist in glucose management.    Regarding his diabetes, he says he has had diabetes for 2 years and his only antihyperglycemic is Trulicity 1.5 mg which he has not taken for the last several weeks .His admission A1c is 7.9%.  Says he checks his blood sugars in the morning and generally they range between 120 and 140.  Breakfast is usually eggs and hashbrowns.  Lunch is soup.  Dinner can be chicken and rice and unsweetened tea.  He says both he and his wife enjoy a regular soda 3 times a week.  He denies any episodes of hypoglycemia or symptoms of hyperglycemia.    Examination  Blood pressure 111/59  Pulse 101  Afebrile  94% on room air  Weight 121 kg    Recent laboratory data  Glucose 327 (range 182-327)  Creatinine 0.8    Impression  1.  Type 2 diabetes mellitus with fair glucose control on Trulicity 1.5 mg weekly  2.  Status post left knee replacement  3.  Pain related to #2    Plan:  1.  We will begin NPH insulin 25 units twice daily  2.  I do anticipate him restarting his Trulicity on discharge  3.  I did caution him that the A1c of 7.9% on a single antihyperglycemic is not optimal and that attention to diet would be appropriate  4.  Rest per team    Before making these care recommendations, I personally reviewed the hospitalization record, including notes, laboratory & diagnostic data and current medications, and examined the patient at the bedside. Total time  40   minutes,.      Please note that this dictation was completed with [employer] voice recognition software.  Quite often unanticipated grammatical, syntax, homophones, and other interpretive errors are inadvertently transcribed by the computer software.  Please disregard these errors.  Please excuse any errors that have escaped

## 2024-12-03 NOTE — PROGRESS NOTES
Ortho NP Note    POD# 1  s/p LEFT TOTAL KNEE ARTHROPLASTY   Pt seen with wife at bedside.     Pt resting in bed comfortably.   Reports postop knee pain well controlled with oxycodone and toradol   He has not been oob since surgery   Tolerating regular diet. No nausea.   Postop plan reviewed - No complaints/concerns.    VSS Afebrile.    Visit Vitals  /69   Pulse 82   Temp 98.4 °F (36.9 °C) (Oral)   Resp 18   Ht 1.778 m (5' 10\")   Wt 120.7 kg (266 lb 1.5 oz)   SpO2 96%   BMI 38.18 kg/m²       Voiding status: voiding         Labs    Lab Results   Component Value Date/Time    HGB 11.0 12/03/2024 06:15 AM      Lab Results   Component Value Date/Time    INR 1.1 12/03/2024 06:15 AM      Lab Results   Component Value Date/Time     11/08/2024 08:16 AM    K 3.8 11/08/2024 08:16 AM     11/08/2024 08:16 AM    CO2 29 11/08/2024 08:16 AM    BUN 12 11/08/2024 08:16 AM     Recent Glucose Results:   Glucose   Date Value Ref Range Status   11/08/2024 142 (H) 65 - 100 mg/dL Final   05/03/2024 165 (H) 65 - 100 mg/dL Final   08/09/2023 122 (H) 65 - 100 mg/dL Final   07/13/2023 119 (H) 70 - 99 mg/dL Final           Body mass index is 38.18 kg/m². : A BMI > 30 is classified as obesity and > 40 is classified as morbid obesity.       Dressing c.d.i  Cryotherapy in place over incision  Calves soft and supple; No pain with passive stretch  Sensation and motor intact. +PF/DF/EHL intact   SCDs for mechanical DVT proph while in bed     PLAN:  1) PT BID, OT - WBAT  2) Aspirin 325 mg PO BID for DVT Prophylaxis. Encouraged early mobilization, bed exercises, and SCD use.  3) Pain control - scheduled tylenol  and toradol, and prn  oxycodone  .  4) DM2 - A1C 8.3 in PCP office, not on any medications. Glucose 184-224 postop. Diet changed to diabetic. DTC consulted.    5) Post op care - Continue bowel regimen, encouraged IS. Straight cath per protocol. Aquacel to remain in place x 7 day unless integrity is lost.    6) Readniess for

## 2024-12-03 NOTE — ANESTHESIA POSTPROCEDURE EVALUATION
Department of Anesthesiology  Postprocedure Note    Patient: Govind Lyle Jr.  MRN: [id number]  YOB: 1965  Date of evaluation: 12/3/2024    Procedure Summary       Date: 12/02/24 Room / Location: [facility] MAIN OR [address] [place] [address] MAIN OR    Anesthesia Start: 1343 Anesthesia Stop: 1556    Procedure: LEFT TOTAL KNEE ARTHROPLASTY (Left: Knee) Diagnosis:       Post-traumatic osteoarthritis of both knees      (Post-traumatic osteoarthritis of both knees [M17.2])    Providers: Vish Vaughn MD Responsible Provider: Aurea Saenz MD    Anesthesia Type: Spinal ASA Status: 2            Anesthesia Type: Spinal    Hermila Phase I: Hermila Score: 9    Hermila Phase II:      Anesthesia Post Evaluation    No notable events documented.

## 2024-12-14 ENCOUNTER — PATIENT MESSAGE (OUTPATIENT)
Age: 59
End: 2024-12-14

## 2024-12-16 RX ORDER — SERTRALINE HYDROCHLORIDE 100 MG/1
100 TABLET, FILM COATED ORAL DAILY
Qty: 90 TABLET | Refills: 3 | Status: SHIPPED | OUTPATIENT
Start: 2024-12-16

## 2024-12-27 ENCOUNTER — TELEPHONE (OUTPATIENT)
Age: 59
End: 2024-12-27

## 2024-12-27 NOTE — TELEPHONE ENCOUNTER
Brittany with [facility] . Patient seen by PT today. In rehab from left knee replacement. Blood pressure today, sitting 134/96 134/99 142/97. Took blood pressure medication today. Pain in neck. Appt next month. Will focus on light session. Patient will recheck BP in 1 hour and report.

## 2024-12-30 ENCOUNTER — TELEPHONE (OUTPATIENT)
Age: 59
End: 2024-12-30

## 2024-12-30 NOTE — TELEPHONE ENCOUNTER
Patients wife called concerning [place] paperwork, stated that she has emails from patients employer that she would like to email over to you.

## 2025-01-08 ENCOUNTER — OFFICE VISIT (OUTPATIENT)
Age: 60
End: 2025-01-08
Payer: MEDICARE

## 2025-01-08 VITALS
WEIGHT: 247.2 LBS | OXYGEN SATURATION: 97 % | HEIGHT: 70 IN | SYSTOLIC BLOOD PRESSURE: 109 MMHG | TEMPERATURE: 97.1 F | BODY MASS INDEX: 35.39 KG/M2 | DIASTOLIC BLOOD PRESSURE: 74 MMHG | HEART RATE: 92 BPM | RESPIRATION RATE: 16 BRPM

## 2025-01-08 DIAGNOSIS — M25.512 ACUTE PAIN OF LEFT SHOULDER: Primary | ICD-10-CM

## 2025-01-08 PROCEDURE — 99213 OFFICE O/P EST LOW 20 MIN: CPT | Performed by: NURSE PRACTITIONER

## 2025-01-08 SDOH — ECONOMIC STABILITY: FOOD INSECURITY: WITHIN THE PAST 12 MONTHS, YOU WORRIED THAT YOUR FOOD WOULD RUN OUT BEFORE YOU GOT MONEY TO BUY MORE.: NEVER TRUE

## 2025-01-08 SDOH — ECONOMIC STABILITY: FOOD INSECURITY: WITHIN THE PAST 12 MONTHS, THE FOOD YOU BOUGHT JUST DIDN'T LAST AND YOU DIDN'T HAVE MONEY TO GET MORE.: NEVER TRUE

## 2025-01-08 ASSESSMENT — PATIENT HEALTH QUESTIONNAIRE - PHQ9: DEPRESSION UNABLE TO ASSESS: PT REFUSES

## 2025-01-08 NOTE — PROGRESS NOTES
Govind Lyle Jr. (:  1965) is a 59 y.o. male,here for evaluation of the following chief complaint(s):  Shoulder Pain (Left burning serve burning sensation ) and Headache (Started 2 months ago been going on for years )    /74   Pulse 92   Temp 97.1 °F (36.2 °C) (Temporal)   Resp 16   Ht 1.778 m (5' 10\")   Wt 112.1 kg (247 lb 3.2 oz)   SpO2 97%   BMI 35.47 kg/m²       SUBJECTIVE/OBJECTIVE:    HPI:Patient reports left shoulder pain for a few years. He recently had xray done which showed tendonitis. No recent injury. He has taken a steroid recently with no improvement. He also reports neck pain and is seeing pain management. He received injection in his neck in the past with no improvement. He has noticed decreased range of motion of left shoulder and weakness.    Review of Systems   Musculoskeletal:         Left shoulder pain       Physical Exam  Constitutional:       Appearance: Normal appearance.   Musculoskeletal:      Left shoulder: Tenderness (just below AC and bursa) present. Decreased range of motion (decreased abduction). Decreased strength.   Neurological:      Mental Status: He is alert.          ASSESSMENT/PLAN:  1. Acute pain of left shoulder  -     [facility] - Hillary Anderson MD, Orthopedic Surgery (sports medicine), [place]  Refer to ortho    --JACKIE Reyes - NP    verbal cues/nonverbal cues (demo/gestures)/1 person assist

## 2025-02-27 ENCOUNTER — TELEPHONE (OUTPATIENT)
Age: 60
End: 2025-02-27

## 2025-02-27 DIAGNOSIS — I10 ESSENTIAL HYPERTENSION, BENIGN: ICD-10-CM

## 2025-02-27 RX ORDER — LOSARTAN POTASSIUM AND HYDROCHLOROTHIAZIDE 25; 100 MG/1; MG/1
1 TABLET ORAL DAILY
Qty: 90 TABLET | Refills: 0 | Status: SHIPPED | OUTPATIENT
Start: 2025-02-27

## 2025-02-27 NOTE — TELEPHONE ENCOUNTER
Chief Complaint   Patient presents with    Medication Refill     Last Appointment with Dr. Louise Osborne:  11/19/2024   Future Appointments   Date Time Provider Department Center   2/27/2025  3:40 PM Janet Cruz PT [facility]   3/4/2025  2:00 PM Vish Vaughn MD [facility]       The above orders were approved via [facility]OR[facility] per Dr. Louise Osborne.

## 2025-02-27 NOTE — TELEPHONE ENCOUNTER
Medication Refill Request    Govind Lyle Jr. is requesting a refill of the following medication(s):     Losartan-Hydrochlorothiazide (Hyzaar) 100-25 MG per tablet    Please send refill to:    [facility] [id number] - [facility] [address] [facility] [phone] - F [phone]

## 2025-05-08 DIAGNOSIS — I10 ESSENTIAL HYPERTENSION, BENIGN: ICD-10-CM

## 2025-05-08 RX ORDER — LOSARTAN POTASSIUM AND HYDROCHLOROTHIAZIDE 25; 100 MG/1; MG/1
1 TABLET ORAL DAILY
Qty: 90 TABLET | Refills: 0 | Status: SHIPPED | OUTPATIENT
Start: 2025-05-08

## 2025-05-08 NOTE — TELEPHONE ENCOUNTER
Chief Complaint   Patient presents with    Medication Refill     Last Appointment with Dr. Louise Osborne:  11/19/2024   Future Appointments   Date Time Provider Department Center   5/8/2025  3:40 PM Naomy De Paz, PT [facility]   5/13/2025  3:20 PM Naomy De Paz, PT [facility]   5/14/2025  4:00 PM Hillary Anderson MD [facility]   5/15/2025  3:20 PM Naomy De Paz, PT [facility]   5/20/2025  3:20 PM Naomy De Paz, PT [facility]   5/22/2025  3:20 PM Naomy De Paz, PT [facility]   5/23/2025  2:30 PM Louise Osborne MD [facility]   5/27/2025  3:20 PM Naomy De Paz, PT [facility]   5/29/2025  3:20 PM Naomy De Paz, PT [facility]       The above orders were approved via VORB per Dr. Louise Osborne.

## 2025-05-20 ENCOUNTER — TELEPHONE (OUTPATIENT)
Age: 60
End: 2025-05-20

## 2025-05-23 SDOH — HEALTH STABILITY: PHYSICAL HEALTH: ON AVERAGE, HOW MANY MINUTES DO YOU ENGAGE IN EXERCISE AT THIS LEVEL?: 20 MIN

## 2025-05-23 SDOH — HEALTH STABILITY: PHYSICAL HEALTH: ON AVERAGE, HOW MANY DAYS PER WEEK DO YOU ENGAGE IN MODERATE TO STRENUOUS EXERCISE (LIKE A BRISK WALK)?: 3 DAYS

## 2025-05-23 ASSESSMENT — PATIENT HEALTH QUESTIONNAIRE - PHQ9
SUM OF ALL RESPONSES TO PHQ QUESTIONS 1-9: 0
1. LITTLE INTEREST OR PLEASURE IN DOING THINGS: NOT AT ALL
SUM OF ALL RESPONSES TO PHQ QUESTIONS 1-9: 0
2. FEELING DOWN, DEPRESSED OR HOPELESS: NOT AT ALL

## 2025-05-27 SDOH — HEALTH STABILITY: PHYSICAL HEALTH: ON AVERAGE, HOW MANY MINUTES DO YOU ENGAGE IN EXERCISE AT THIS LEVEL?: 20 MIN

## 2025-05-27 SDOH — HEALTH STABILITY: PHYSICAL HEALTH: ON AVERAGE, HOW MANY DAYS PER WEEK DO YOU ENGAGE IN MODERATE TO STRENUOUS EXERCISE (LIKE A BRISK WALK)?: 3 DAYS

## 2025-05-27 ASSESSMENT — PATIENT HEALTH QUESTIONNAIRE - PHQ9
1. LITTLE INTEREST OR PLEASURE IN DOING THINGS: NOT AT ALL
2. FEELING DOWN, DEPRESSED OR HOPELESS: NOT AT ALL
SUM OF ALL RESPONSES TO PHQ QUESTIONS 1-9: 0

## 2025-05-27 ASSESSMENT — LIFESTYLE VARIABLES
HOW MANY STANDARD DRINKS CONTAINING ALCOHOL DO YOU HAVE ON A TYPICAL DAY: 0
HOW OFTEN DO YOU HAVE SIX OR MORE DRINKS ON ONE OCCASION: 1
HOW OFTEN DO YOU HAVE A DRINK CONTAINING ALCOHOL: 1
HOW OFTEN DO YOU HAVE A DRINK CONTAINING ALCOHOL: NEVER
HOW MANY STANDARD DRINKS CONTAINING ALCOHOL DO YOU HAVE ON A TYPICAL DAY: PATIENT DOES NOT DRINK

## 2025-05-30 ENCOUNTER — OFFICE VISIT (OUTPATIENT)
Age: 60
End: 2025-05-30
Payer: MEDICARE

## 2025-05-30 VITALS
BODY MASS INDEX: 35.14 KG/M2 | HEIGHT: 71 IN | RESPIRATION RATE: 16 BRPM | WEIGHT: 251 LBS | HEART RATE: 80 BPM | DIASTOLIC BLOOD PRESSURE: 81 MMHG | OXYGEN SATURATION: 95 % | SYSTOLIC BLOOD PRESSURE: 116 MMHG | TEMPERATURE: 98 F

## 2025-05-30 DIAGNOSIS — E66.01 SEVERE OBESITY (BMI 35.0-39.9) WITH COMORBIDITY (HCC): ICD-10-CM

## 2025-05-30 DIAGNOSIS — I10 ESSENTIAL HYPERTENSION, BENIGN: ICD-10-CM

## 2025-05-30 DIAGNOSIS — E11.65 UNCONTROLLED TYPE 2 DIABETES MELLITUS WITH HYPERGLYCEMIA (HCC): ICD-10-CM

## 2025-05-30 DIAGNOSIS — E55.9 VITAMIN D DEFICIENCY: ICD-10-CM

## 2025-05-30 DIAGNOSIS — E78.2 MIXED HYPERLIPIDEMIA: ICD-10-CM

## 2025-05-30 DIAGNOSIS — Z12.5 SCREENING PSA (PROSTATE SPECIFIC ANTIGEN): ICD-10-CM

## 2025-05-30 DIAGNOSIS — Z00.00 MEDICARE ANNUAL WELLNESS VISIT, SUBSEQUENT: Primary | ICD-10-CM

## 2025-05-30 PROBLEM — M54.16 LUMBAR RADICULOPATHY: Status: ACTIVE | Noted: 2024-08-23

## 2025-05-30 PROCEDURE — 3079F DIAST BP 80-89 MM HG: CPT | Performed by: INTERNAL MEDICINE

## 2025-05-30 PROCEDURE — 3017F COLORECTAL CA SCREEN DOC REV: CPT | Performed by: INTERNAL MEDICINE

## 2025-05-30 PROCEDURE — 2022F DILAT RTA XM EVC RTNOPTHY: CPT | Performed by: INTERNAL MEDICINE

## 2025-05-30 PROCEDURE — G8417 CALC BMI ABV UP PARAM F/U: HCPCS | Performed by: INTERNAL MEDICINE

## 2025-05-30 PROCEDURE — 99213 OFFICE O/P EST LOW 20 MIN: CPT | Performed by: INTERNAL MEDICINE

## 2025-05-30 PROCEDURE — 1036F TOBACCO NON-USER: CPT | Performed by: INTERNAL MEDICINE

## 2025-05-30 PROCEDURE — G0439 PPPS, SUBSEQ VISIT: HCPCS | Performed by: INTERNAL MEDICINE

## 2025-05-30 PROCEDURE — G8427 DOCREV CUR MEDS BY ELIG CLIN: HCPCS | Performed by: INTERNAL MEDICINE

## 2025-05-30 PROCEDURE — 3046F HEMOGLOBIN A1C LEVEL >9.0%: CPT | Performed by: INTERNAL MEDICINE

## 2025-05-30 PROCEDURE — 3074F SYST BP LT 130 MM HG: CPT | Performed by: INTERNAL MEDICINE

## 2025-05-30 RX ORDER — ATORVASTATIN CALCIUM 40 MG/1
40 TABLET, FILM COATED ORAL NIGHTLY
Qty: 90 TABLET | Refills: 1 | Status: SHIPPED | OUTPATIENT
Start: 2025-05-30

## 2025-05-30 NOTE — PATIENT INSTRUCTIONS
having a problem from this test. If dilating drops are used for a vision test, they may make the eyes sting and cause a medicine taste in the mouth.  Follow-up care is a key part of your treatment and safety. Be sure to make and go to all appointments, and call your doctor if you are having problems. It's also a good idea to know your test results and keep a list of the medicines you take.  Where can you learn more?  Go to https://www.[employer].net/patientEd and enter G551 to learn more about \"Learning About Vision Tests.\"  Current as of: July 31, 2024  Content Version: 14.4  © 3197-9332 [employer].   Care instructions adapted under license by [employer]. If you have questions about a medical condition or this instruction, always ask your healthcare professional. [employer], disclaims any warranty or liability for your use of this information.         Starting a Weight-Loss Plan: Care Instructions  Overview    It can be a challenge to lose weight. But your doctor can help you make a weight-loss plan that meets your needs.  You don't have to make a lot of big changes at once. A better idea might be to focus on small changes and stick with them. When those changes become habit, you can add a few more changes.  Some people find it helpful to take an exercise or nutrition class. If you have questions, ask your doctor about seeing a registered dietitian or an exercise specialist. You might also think about joining a weight-loss support group.  If you're not ready to make changes right now, try to pick a date in the future. Then make an appointment with your doctor to talk about when and how you'll get started with a plan.  Follow-up care is a key part of your treatment and safety. Be sure to make and go to all appointments, and call your doctor if you are having problems. It's also a good idea to know your test results and keep a list of the medicines you take.  How can you care for yourself as

## 2025-05-30 NOTE — ASSESSMENT & PLAN NOTE
Wt Readings from Last 3 Encounters:   05/30/25 113.9 kg (251 lb)   05/14/25 112.5 kg (248 lb)   04/16/25 112.5 kg (248 lb)     Discussed diet and exercise strategies for weight loss

## 2025-05-30 NOTE — ASSESSMENT & PLAN NOTE
Lab Results   Component Value Date    LDL 94 05/03/2024    LDLDIRECT 142 (H) 11/08/2024     Tolerating statin therapy, plan to continue current regimen pending lab results  Recheck labs to assess for response to medication, whether LDL is at target, and monitor for side effects

## 2025-05-30 NOTE — ASSESSMENT & PLAN NOTE
Hemoglobin A1C   Date Value Ref Range Status   12/03/2024 7.9 (H) 4.0 - 5.6 % Final     Comment:     (NOTE)  HbA1C Interpretive Ranges  <5.7              Normal  5.7 - 6.4         Consider Prediabetes  >6.5              Consider Diabetes     Overdue for lab monitoring, compliant with trulicity   Will see if we can get mounjaro covered for extra weight loss benefit

## 2025-05-30 NOTE — PROGRESS NOTES
Medicare Annual Wellness Visit    Govind Lyle JrElmer is here for Medicare AWV    Assessment & Plan   Medicare annual wellness visit, subsequent  Essential hypertension, benign  Assessment & Plan:   Controlled with current regimen, plan to continue   Orders:  -     TSH; Future  -     Comprehensive Metabolic Panel; Future  -     CBC with Auto Differential; Future  -     Albumin/Creatinine Ratio, Urine; Future  Uncontrolled type 2 diabetes mellitus with hyperglycemia (HCC)  Assessment & Plan:      Hemoglobin A1C   Date Value Ref Range Status   12/03/2024 7.9 (H) 4.0 - 5.6 % Final     Comment:     (NOTE)  HbA1C Interpretive Ranges  <5.7              Normal  5.7 - 6.4         Consider Prediabetes  >6.5              Consider Diabetes     Overdue for lab monitoring, compliant with trulicity   Will see if we can get mounjaro covered for extra weight loss benefit  Orders:  -     Hemoglobin A1C; Future  -     Albumin/Creatinine Ratio, Urine; Future  -     Tirzepatide (MOUNJARO) 2.5 MG/0.5ML SOAJ pen; Inject 2.5 mg into the skin every 7 days, Disp-2 mL, R-0Normal  Mixed hyperlipidemia  Assessment & Plan:  Lab Results   Component Value Date    LDL 94 05/03/2024    LDLDIRECT 142 (H) 11/08/2024     Tolerating statin therapy, plan to continue current regimen pending lab results  Recheck labs to assess for response to medication, whether LDL is at target, and monitor for side effects    Orders:  -     Lipid Panel; Future  -     TSH; Future  -     Comprehensive Metabolic Panel; Future  -     CBC with Auto Differential; Future  -     atorvastatin (LIPITOR) 40 MG tablet; Take 1 tablet by mouth at bedtime, Disp-90 tablet, R-1Normal  Severe obesity (BMI 35.0-39.9) with comorbidity (HCC)  Assessment & Plan:  Wt Readings from Last 3 Encounters:   05/30/25 113.9 kg (251 lb)   05/14/25 112.5 kg (248 lb)   04/16/25 112.5 kg (248 lb)     Discussed diet and exercise strategies for weight loss   Screening PSA (prostate specific antigen)  -

## 2025-06-09 ENCOUNTER — PATIENT MESSAGE (OUTPATIENT)
Age: 60
End: 2025-06-09

## 2025-06-09 DIAGNOSIS — E11.65 UNCONTROLLED TYPE 2 DIABETES MELLITUS WITH HYPERGLYCEMIA (HCC): ICD-10-CM

## 2025-06-09 NOTE — TELEPHONE ENCOUNTER
Pt does not qualify for  coupon due to Medicare coverage.  No patient assistance available for Mounjaro.

## 2025-06-11 ENCOUNTER — TELEPHONE (OUTPATIENT)
Age: 60
End: 2025-06-11

## 2025-06-11 NOTE — TELEPHONE ENCOUNTER
Reason for call: TC from wife of patient.  Wife would like a call back in regards to the [employer] direct program for patient Mounjaro     Is this a new problem: No    Date of last appointment:  5/30/2025     Can we respond via [employer]: No    Best call back number:      Katalina Lyle () [phone] (Home)

## 2025-06-11 NOTE — TELEPHONE ENCOUNTER
Called pt wife Katalina Lyle (on PHI). Advised pt wife the manufacturers coupon will not cover mounajro due to pt having medicare, and PAP is unavailable for mounjaro. Pt wife verbalized understanding. Wife requests pt be switched back to trulicty as previously covered. Advised ONDINA is out of office until Friday 6/13/2025. Pt wife verbalized understanding. Will forward to MD for review upon return.

## 2025-06-13 ENCOUNTER — TELEPHONE (OUTPATIENT)
Age: 60
End: 2025-06-13

## 2025-06-13 DIAGNOSIS — I48.91 ATRIAL FIBRILLATION, UNSPECIFIED TYPE (HCC): Primary | ICD-10-CM

## 2025-06-13 LAB — LEFT VENTRICULAR EJECTION FRACTION, EXTERNAL: NORMAL

## 2025-06-13 RX ORDER — DILTIAZEM HYDROCHLORIDE 180 MG/1
180 CAPSULE, COATED, EXTENDED RELEASE ORAL DAILY
COMMUNITY
Start: 2025-06-13

## 2025-06-13 NOTE — TELEPHONE ENCOUNTER
Patient's wife called and wanted to let Dr. Osborne know that her  is out of Trulicity and will need a refill sent in.    Katalina Lyle - [phone]

## 2025-06-13 NOTE — PROGRESS NOTES
Call from wife regarding eliquis sent from hospital not covered by insurance, will try Xarelto  Further management per cardiology

## 2025-06-13 NOTE — TELEPHONE ENCOUNTER
Will try sending xarelto and confirm with pharmacy if covered  Further management should be per cardiology

## 2025-06-13 NOTE — TELEPHONE ENCOUNTER
Patient's wife, Katalina, called.  [facility]  He was told to stop taking Losartan and prescribed Eliquis, but it is not covered under his insurance.  Katalina would like to know if there is an alternative medication that can be prescribed.    Katalina Valdo  [phone]

## 2025-06-13 NOTE — TELEPHONE ENCOUNTER
Spoke to pt wife. Pt wife states pt is currently in the hospital for A-Fib. Pt is currently in [facility]. Pt wife states trulicity was set up at the office for pt to get shipments when Dr. Cruz was here. Pt wife is asking if there is any other med to help with his diabetes and potentially help with weight lost. Will forward to MD for review.

## 2025-06-13 NOTE — TELEPHONE ENCOUNTER
Patient verbalized understanding of discharge instructions, no questions at this time. VS stable, patient will ambulate to exit with d/c instructions and rx in hand.      We can put him back on trulicity, do we need to do anything for this or is he still getting shipments from the [facility]

## 2025-06-13 NOTE — TELEPHONE ENCOUNTER
Reason for call:  pt wife is calling to find out what to do before the weekend comes please call and advise     Signed         Patient's wife, Katalina, called.  The patient was just discharged from [facility] with Afib.  He was told to stop taking Losartan and prescribed Eliquis, but it is not covered under his insurance.  Katalina would like to know if there is an alternative medication that can be prescribed.     Katalina Lyle - [phone]                Is this a new problem: Yes    Date of last appointment:  5/30/2025     Can we respond via [employer]: No    Best call back number:   ValdoKatalina () [phone] (Home)

## 2025-06-13 NOTE — TELEPHONE ENCOUNTER
Confirmed with pharmacy Eliquis about 400$ for 90 days, xarelto is actually more  Discussed with wife, this is not financially sustainable  Advised on some increased risk of stroke with Afib and this is why blood thinners are advised  They can discuss with cardiology if warfarin would be needed  In the meantime suggest asa 81mg  Start Diltiazem as advised, stop losartan for now

## 2025-06-20 ENCOUNTER — TELEPHONE (OUTPATIENT)
Age: 60
End: 2025-06-20

## 2025-06-20 DIAGNOSIS — I10 ESSENTIAL HYPERTENSION, BENIGN: Primary | ICD-10-CM

## 2025-06-20 RX ORDER — HYDROCHLOROTHIAZIDE 25 MG/1
25 TABLET ORAL EVERY MORNING
Qty: 90 TABLET | Refills: 1 | Status: SHIPPED | OUTPATIENT
Start: 2025-06-20

## 2025-06-20 NOTE — TELEPHONE ENCOUNTER
I would not stop diltiazem before talking to cardiology because that med keeps the heart rate in check and the losartan does not. I can send him hydrochlorothiazide to add in which is the diuretic portion of his old med that helps remove excess fluid

## 2025-06-20 NOTE — TELEPHONE ENCOUNTER
Patient was seen at [facility], on 6/12, for Afib and they changed his Losartan to Diltiazem.  He said that the Diltiazem is causing his ankles to swell and would like to know if he can change back to Losartan until he sees his cardiologist on 6/26/25.    Govind Lyle - [phone]

## 2025-06-26 ENCOUNTER — PATIENT MESSAGE (OUTPATIENT)
Age: 60
End: 2025-06-26

## 2025-06-26 DIAGNOSIS — E11.65 UNCONTROLLED TYPE 2 DIABETES MELLITUS WITH HYPERGLYCEMIA (HCC): Primary | ICD-10-CM

## 2025-06-26 NOTE — TELEPHONE ENCOUNTER
Spoke to Jensen at [facility]. States pt should be receiving Trulicity 4 month supply today. States there were no refills on the form. Can send an e-prescription for Trulicty to [facility]. Order should have quantity as 4 months with how many refills MD would like to include. Will forward to MD for review.

## 2025-08-21 ENCOUNTER — TRANSCRIBE ORDERS (OUTPATIENT)
Facility: HOSPITAL | Age: 60
End: 2025-08-21

## 2025-08-21 DIAGNOSIS — M54.2 NECK PAIN: Primary | ICD-10-CM

## 2025-08-21 DIAGNOSIS — M54.12 BRACHIAL NEURITIS: Primary | ICD-10-CM

## (undated) DEVICE — ZIMMER® STERILE DISPOSABLE TOURNIQUET CUFF WITH PLC, DUAL PORT, SINGLE BLADDER, 34 IN. (86 CM)

## (undated) DEVICE — DRESSING HYDROFIBER AQUACEL AG ADVANTAGE 3.5X14 IN

## (undated) DEVICE — SOLUTION IRRIG 3000ML 0.9% SOD CHL USP UROMATIC PLAS CONT

## (undated) DEVICE — Device

## (undated) DEVICE — GLOVE ORTHO 8   MSG9480

## (undated) DEVICE — DRESSING HYDROFIBER AQUACEL AG ADVANTAGE 3.5X10 IN

## (undated) DEVICE — BANDAGE COMPR M W6INXL10YD WHT BGE VELC E MTRX HK AND LOOP

## (undated) DEVICE — 450 ML BOTTLE OF 0.05% CHLORHEXIDINE GLUCONATE IN 99.95% STERILE WATER FOR IRRIGATION, USP AND APPLICATOR.: Brand: IRRISEPT ANTIMICROBIAL WOUND LAVAGE

## (undated) DEVICE — IASSIST KNEE SYSTEM V2 POD KIT

## (undated) DEVICE — HANDPIECE SET WITH BONE CLEANING TIP AND SUCTION TUBE: Brand: INTERPULSE

## (undated) DEVICE — BLADE SAW W0.49XL3.15IN THK0.047IN CUT THK0.047IN REPL SAG

## (undated) DEVICE — SOLUTION SURG PREP 26 CC PURPREP

## (undated) DEVICE — TAPE,CLOTH/SILK,CURAD,3"X10YD,LF,40/CS: Brand: CURAD

## (undated) DEVICE — HOOD, PEEL-AWAY: Brand: FLYTE

## (undated) DEVICE — STRYKER PERFORMANCE SERIES SAGITTAL BLADE: Brand: STRYKER PERFORMANCE SERIES

## (undated) DEVICE — CONTAINER,SPECIMEN,3OZ,OR STRL: Brand: MEDLINE

## (undated) DEVICE — SUTURE VICRYL ABSORBABLE BRAIDED 2-0 CT 36 IN DA UD  VCP957H

## (undated) DEVICE — INSTRUMENT SCREW BNE L25MM DIA2.5MM KNEE FULL THRD HEX FEM PERSONA

## (undated) DEVICE — SYRINGE 20ML LL S/C 50

## (undated) DEVICE — DRAPE,EXTREMITY,89X128,STERILE: Brand: MEDLINE

## (undated) DEVICE — DRESSING AQUACEL ADANTAGE SIL RBBN W2XL45CM STRENGTHENING FBR FOR PK SMALLER

## (undated) DEVICE — SUTURE VICRYL COATED ABSORBABLE BRAIDED 2-0 TP1 54 IN COAT UD VICRYL + VCP880T

## (undated) DEVICE — GLOVE SURG SZ 65 L12IN FNGR THK94MIL STD WHT LTX FREE

## (undated) DEVICE — SCRUBIN SCRUB BRUSH DRY STER: Brand: MEDLINE INDUSTRIES, INC.

## (undated) DEVICE — SPONGE GZ W4XL4IN COT 12 PLY TYP VII WVN C FLD DSGN STERILE

## (undated) DEVICE — 4-PORT MANIFOLD: Brand: NEPTUNE 2

## (undated) DEVICE — PADDING CAST W6INXL4YD ST COT RAYON MICROPLEATED HIGHLY

## (undated) DEVICE — 2108 SERIES SAGITTAL BLADE (18.6 X 0.8 X 73.8MM)

## (undated) DEVICE — BLADE SAW W073XL276IN THK0031IN CUT THK0036IN REPL SAG

## (undated) DEVICE — PADDING CAST W6INXL4YD NONSTERILE COT RAYON MICROPLEATED

## (undated) DEVICE — GLOVE SURG SZ 65 L12IN FNGR THK79MIL GRN LTX FREE

## (undated) DEVICE — TUBING, SUCTION, 9/32" X 12', STRAIGHT: Brand: MEDLINE INDUSTRIES, INC.

## (undated) DEVICE — TOTAL JOINT - SMH: Brand: MEDLINE INDUSTRIES, INC.

## (undated) DEVICE — SUTURE VICRYL + SZ 0 L36IN ABSRB VLT L40MM CT 1/2 CIR TAPR VCP358H

## (undated) DEVICE — GLOVE SURG SZ 8 L12IN FNGR THK94MIL STD WHT LTX FREE

## (undated) DEVICE — SUTURE STRATAFIX SYMMETRIC PDS + SZ 1 L18IN ABSRB VLT L48MM SXPP1A400